# Patient Record
Sex: FEMALE | Race: WHITE | Employment: OTHER | URBAN - METROPOLITAN AREA
[De-identification: names, ages, dates, MRNs, and addresses within clinical notes are randomized per-mention and may not be internally consistent; named-entity substitution may affect disease eponyms.]

---

## 2017-01-14 DIAGNOSIS — M54.2 NECK PAIN: ICD-10-CM

## 2017-01-14 DIAGNOSIS — M17.0 PRIMARY OSTEOARTHRITIS OF BOTH KNEES: ICD-10-CM

## 2017-01-17 RX ORDER — IBUPROFEN 600 MG/1
TABLET ORAL
Qty: 60 TAB | Refills: 2 | Status: SHIPPED | OUTPATIENT
Start: 2017-01-17 | End: 2017-07-01 | Stop reason: SDUPTHER

## 2017-01-25 ENCOUNTER — OFFICE VISIT (OUTPATIENT)
Dept: FAMILY MEDICINE CLINIC | Age: 67
End: 2017-01-25

## 2017-01-25 ENCOUNTER — HOSPITAL ENCOUNTER (OUTPATIENT)
Dept: LAB | Age: 67
Discharge: HOME | End: 2017-01-25

## 2017-01-25 VITALS
SYSTOLIC BLOOD PRESSURE: 135 MMHG | HEART RATE: 76 BPM | BODY MASS INDEX: 25.41 KG/M2 | TEMPERATURE: 98.1 F | WEIGHT: 147 LBS | DIASTOLIC BLOOD PRESSURE: 86 MMHG

## 2017-01-25 DIAGNOSIS — B18.2 CHRONIC HEPATITIS C WITHOUT HEPATIC COMA (HCC): ICD-10-CM

## 2017-01-25 DIAGNOSIS — E78.00 PURE HYPERCHOLESTEROLEMIA: ICD-10-CM

## 2017-01-25 DIAGNOSIS — I10 ESSENTIAL HYPERTENSION WITH GOAL BLOOD PRESSURE LESS THAN 140/90: ICD-10-CM

## 2017-01-25 DIAGNOSIS — Z23 ENCOUNTER FOR IMMUNIZATION: ICD-10-CM

## 2017-01-25 DIAGNOSIS — E11.9 CONTROLLED TYPE 2 DIABETES MELLITUS WITHOUT COMPLICATION, WITHOUT LONG-TERM CURRENT USE OF INSULIN (HCC): Primary | ICD-10-CM

## 2017-01-25 DIAGNOSIS — M79.10 MUSCLE PAIN: ICD-10-CM

## 2017-01-25 DIAGNOSIS — E11.9 CONTROLLED TYPE 2 DIABETES MELLITUS WITHOUT COMPLICATION, WITHOUT LONG-TERM CURRENT USE OF INSULIN (HCC): ICD-10-CM

## 2017-01-25 LAB
ALBUMIN SERPL BCP-MCNC: 3.9 G/DL (ref 3.5–5)
ALBUMIN/GLOB SERPL: 1.2 {RATIO} (ref 1.1–2.2)
ALP SERPL-CCNC: 109 U/L (ref 45–117)
ALT SERPL-CCNC: 71 U/L (ref 12–78)
ANION GAP BLD CALC-SCNC: 7 MMOL/L (ref 5–15)
AST SERPL W P-5'-P-CCNC: 41 U/L (ref 15–37)
BILIRUB SERPL-MCNC: 0.7 MG/DL (ref 0.2–1)
BUN SERPL-MCNC: 14 MG/DL (ref 6–20)
BUN/CREAT SERPL: 23 (ref 12–20)
CALCIUM SERPL-MCNC: 9.2 MG/DL (ref 8.5–10.1)
CHLORIDE SERPL-SCNC: 100 MMOL/L (ref 97–108)
CO2 SERPL-SCNC: 31 MMOL/L (ref 21–32)
CREAT SERPL-MCNC: 0.62 MG/DL (ref 0.55–1.02)
ERYTHROCYTE [DISTWIDTH] IN BLOOD BY AUTOMATED COUNT: 13.1 % (ref 11.5–14.5)
EST. AVERAGE GLUCOSE BLD GHB EST-MCNC: 126 MG/DL
GLOBULIN SER CALC-MCNC: 3.3 G/DL (ref 2–4)
GLUCOSE POC: NORMAL MG/DL
GLUCOSE SERPL-MCNC: 109 MG/DL (ref 65–100)
HBA1C MFR BLD: 6 % (ref 4.2–6.3)
HCT VFR BLD AUTO: 45.4 % (ref 35–47)
HGB BLD-MCNC: 15.6 G/DL (ref 11.5–16)
MCH RBC QN AUTO: 30.8 PG (ref 26–34)
MCHC RBC AUTO-ENTMCNC: 34.4 G/DL (ref 30–36.5)
MCV RBC AUTO: 89.5 FL (ref 80–99)
PLATELET # BLD AUTO: 184 K/UL (ref 150–400)
POTASSIUM SERPL-SCNC: 3.9 MMOL/L (ref 3.5–5.1)
PROT SERPL-MCNC: 7.2 G/DL (ref 6.4–8.2)
RBC # BLD AUTO: 5.07 M/UL (ref 3.8–5.2)
SODIUM SERPL-SCNC: 138 MMOL/L (ref 136–145)
WBC # BLD AUTO: 5.5 K/UL (ref 3.6–11)

## 2017-01-25 PROCEDURE — 83036 HEMOGLOBIN GLYCOSYLATED A1C: CPT

## 2017-01-25 PROCEDURE — 85027 COMPLETE CBC AUTOMATED: CPT

## 2017-01-25 PROCEDURE — 80053 COMPREHEN METABOLIC PANEL: CPT

## 2017-01-25 RX ORDER — CYCLOBENZAPRINE HCL 10 MG
10 TABLET ORAL
Qty: 30 TAB | Refills: 2 | Status: SHIPPED | OUTPATIENT
Start: 2017-01-25 | End: 2017-08-05 | Stop reason: SDUPTHER

## 2017-02-20 ENCOUNTER — PATIENT OUTREACH (OUTPATIENT)
Dept: FAMILY MEDICINE CLINIC | Age: 67
End: 2017-02-20

## 2017-02-20 NOTE — PROGRESS NOTES
I called pt today for annual wellness visit and to prepare pt ahead of visit. She returned my call and we reviewed the medicare wellness preventative services schedule and updated her chart.

## 2017-02-22 ENCOUNTER — OFFICE VISIT (OUTPATIENT)
Dept: FAMILY MEDICINE CLINIC | Age: 67
End: 2017-02-22

## 2017-02-22 VITALS
DIASTOLIC BLOOD PRESSURE: 86 MMHG | HEART RATE: 77 BPM | SYSTOLIC BLOOD PRESSURE: 122 MMHG | BODY MASS INDEX: 26.79 KG/M2 | TEMPERATURE: 98.1 F | WEIGHT: 155 LBS

## 2017-02-22 DIAGNOSIS — Z12.11 SCREENING FOR COLON CANCER: ICD-10-CM

## 2017-02-22 DIAGNOSIS — Z13.39 SCREENING FOR ALCOHOLISM: ICD-10-CM

## 2017-02-22 DIAGNOSIS — F33.1 MODERATE EPISODE OF RECURRENT MAJOR DEPRESSIVE DISORDER (HCC): ICD-10-CM

## 2017-02-22 DIAGNOSIS — R11.0 NAUSEA: ICD-10-CM

## 2017-02-22 DIAGNOSIS — E78.00 PURE HYPERCHOLESTEROLEMIA: ICD-10-CM

## 2017-02-22 DIAGNOSIS — E11.9 CONTROLLED TYPE 2 DIABETES MELLITUS WITHOUT COMPLICATION, WITHOUT LONG-TERM CURRENT USE OF INSULIN (HCC): ICD-10-CM

## 2017-02-22 DIAGNOSIS — Z00.00 ROUTINE GENERAL MEDICAL EXAMINATION AT A HEALTH CARE FACILITY: ICD-10-CM

## 2017-02-22 LAB — GLUCOSE POC: NORMAL MG/DL

## 2017-02-22 RX ORDER — PRAVASTATIN SODIUM 40 MG/1
40 TABLET ORAL
COMMUNITY
End: 2018-07-23

## 2017-02-22 RX ORDER — PAROXETINE HYDROCHLORIDE 40 MG/1
TABLET, FILM COATED ORAL
Qty: 90 TAB | Refills: 0 | Status: SHIPPED | OUTPATIENT
Start: 2017-02-22 | End: 2017-06-29 | Stop reason: SDUPTHER

## 2017-02-22 RX ORDER — PROMETHAZINE HYDROCHLORIDE 12.5 MG/1
12.5 TABLET ORAL
Qty: 30 TAB | Refills: 0 | Status: SHIPPED | OUTPATIENT
Start: 2017-02-22 | End: 2017-05-09 | Stop reason: SDUPTHER

## 2017-02-22 NOTE — PROGRESS NOTES
Coordination of Care  1. Have you been to the ER, urgent care clinic since your last visit? Hospitalized since your last visit? No    2. Have you seen or consulted any other health care providers outside of the [facility] since your last visit? Include any pap smears or colon screening.  No    Medications  Medication Reconciliation Performed: no  Patient does need refills     Learning Assessment Complete? yes  Results for orders placed or performed in visit on 02/22/17   AMB POC GLUCOSE BLOOD, BY GLUCOSE MONITORING DEVICE   Result Value Ref Range    Glucose POC 99 Fasting mg/dL

## 2017-02-22 NOTE — PROGRESS NOTES
This is an Initial Medicare Annual Wellness Exam (AWV) (Performed 12 months after IPPE or effective date of Medicare Part B enrollment, Once in a lifetime)    I have reviewed the patient's medical history in detail and updated the computerized patient record. History     Past Medical History:   Diagnosis Date    Diabetes ([address]     Gastrointestinal disorder     History of mammography, screening 2013    Hypertension     Pap smear for cervical cancer screening 2013      Past Surgical History:   Procedure Laterality Date    HX ORTHOPAEDIC Right 2012    right knee medial meniscus surgery     Current Outpatient Prescriptions   Medication Sig Dispense Refill    cyclobenzaprine (FLEXERIL) 10 mg tablet Take 1 Tab by mouth three (3) times daily as needed for Muscle Spasm(s). 30 Tab 2    ibuprofen (MOTRIN) 600 mg tablet TAKE 1 TAB BY MOUTH EVERY EIGHT (8) HOURS AS NEEDED FOR PAIN. 60 Tab 2    lisinopril-hydroCHLOROthiazide (PRINZIDE, ZESTORETIC) 10-12.5 mg per tablet TAKE 1 TABLET BY MOUTH EVERY DAY 90 Tab 1    metFORMIN ER (GLUCOPHAGE XR) 500 mg tablet TAKE 1 TABLET BY MOUTH EVERY DAY WITH DINNER 30 Tab 5    triamcinolone acetonide (KENALOG) 0.025 % ointment Apply a thin layer to rash on face twice a day. 15 g 0    pravastatin (PRAVACHOL) 20 mg tablet Take 1 Tab by mouth nightly. 30 Tab 5    promethazine (PHENERGAN) 12.5 mg tablet Take 1 Tab by mouth every six (6) hours as needed for Nausea. 30 Tab 0    PARoxetine (PAXIL) 40 mg tablet TAKE 1 TABLET BY MOUTH DAILY. 90 Tab 0    traMADol (ULTRAM) 50 mg tablet Take 1 Tab by mouth every eight (8) hours as needed for Pain. Max Daily Amount: 150 mg. 30 Tab 1    Blood-Glucose Meter (TRUE METRIX AIR GLUCOSE METER) [facility] [place] as directed. 1 Each 0    glucose blood VI test strips (TRUE METRIX GLUCOSE TEST STRIP) strip Use to check glucose twice a day. 100 Strip 5    lancets (TRUEPLUS LANCETS) 28 gauge misc Use to check glucose twice a day.  100 Units 5    Blood Glucose Control, Low (TRUE METRIX LEVEL 1) soln Use as directed. 1 Each 2    alcohol swabs (BD SINGLE USE SWABS REGULAR) padm Use as directed. 100 Pad 5    Arm Brace (NEOPRENE WRIST SPLINT SUPPORT) misc Apply to right wrist. 1 Each 0     Allergies   Allergen Reactions    Amoxicillin Nausea and Vomiting    Penicillin G Nausea and Vomiting     Pt can't remember that reaction it caused.      Family History   Problem Relation Age of Onset   Kalyn Tanner Cancer Mother       at 59 lung cancer    Cancer Brother      half brother    Other Maternal Grandmother      GI hemorrhage    Other Father      Pt does not know her father    Cancer Brother      half brother     Social History   Substance Use Topics    Smoking status: Current Every Day Smoker     Packs/day: 0.50     Types: Cigarettes    Smokeless tobacco: Not on file    Alcohol use No     Patient Active Problem List   Diagnosis Code    Depression F32.9    Diabetes mellitus type 2, controlled ([facility] [address] E11.9    Plantar wart of right foot B07.0    Hepatitis C, chronic (HCC) B18.2    Onychomycosis B35.1    Primary osteoarthritis of both knees M17.0    Pure hypercholesterolemia E78.00    Osteoporosis M81.0    Essential hypertension with goal blood pressure less than 140/90 I10         Depression Risk Factor Screening:     PHQ 2 / 9, over the last two weeks 2017   Little interest or pleasure in doing things Several days   Feeling down, depressed or hopeless Nearly every day   Total Score PHQ 2 4   Trouble falling or staying asleep, or sleeping too much Several days   Feeling tired or having little energy Several days   Poor appetite or overeating Several days   Feeling bad about yourself - or that you are a failure or have let yourself or your family down More than half the days   Trouble concentrating on things such as school, work, reading or watching TV Not at all   Moving or speaking so slowly that other people could have noticed; or the opposite being so fidgety that others notice Not at all   Thoughts of being better off dead, or hurting yourself in some way Not at all   PHQ 9 Score 9   How difficult have these problems made it for you to do your work, take care of your home and get along with others Somewhat difficult     Alcohol Risk Factor Screening:   {screenin}    Functional Ability and Level of Safety:     Hearing Loss   {Desc; hearing loss:82028}    Activities of Daily Living   {ADL:03561::\"Self-care\"}. Requires assistance with: {ADLs:98207::\"no ADLs\"}    Fall Risk     Fall Risk Assessment, last 12 mths 2017   Able to walk? Yes   Fall in past 12 months? No     Abuse Screen   {Abuse Screen:::\"Patient is not abused\"}    Review of Systems   {ros - complete:59701}    Physical Examination     No exam data present    Evaluation of Cognitive Function:  Mood/affect:  {mood:52172}  Appearance: {APPEARANCE:42294::\"age appropriate\"}  Family member/caregiver input: ***    {Exam, Complete:11180}    Patient Care Team:  Chente Beltrán MD as PCP - General (Family Practice)    Advice/Referrals/Counseling   Education and counseling provided:  {Education List, choose as appropriate:}    Assessment/Plan   {Assessment and Plan:74907}.

## 2017-02-22 NOTE — PROGRESS NOTES
This is an Initial Medicare Annual Wellness Exam (AWV) (Performed 12 months after IPPE or effective date of Medicare Part B enrollment, Once in a lifetime)    I have reviewed the patient's medical history in detail and updated the computerized patient record. History     Past Medical History:   Diagnosis Date    Diabetes ([address]     Gastrointestinal disorder     History of mammography, screening 2013    Hypertension     Pap smear for cervical cancer screening 2013      Past Surgical History:   Procedure Laterality Date    HX ORTHOPAEDIC Right 2012    right knee medial meniscus surgery     Current Outpatient Prescriptions   Medication Sig Dispense Refill    cyclobenzaprine (FLEXERIL) 10 mg tablet Take 1 Tab by mouth three (3) times daily as needed for Muscle Spasm(s). 30 Tab 2    ibuprofen (MOTRIN) 600 mg tablet TAKE 1 TAB BY MOUTH EVERY EIGHT (8) HOURS AS NEEDED FOR PAIN. 60 Tab 2    lisinopril-hydroCHLOROthiazide (PRINZIDE, ZESTORETIC) 10-12.5 mg per tablet TAKE 1 TABLET BY MOUTH EVERY DAY 90 Tab 1    metFORMIN ER (GLUCOPHAGE XR) 500 mg tablet TAKE 1 TABLET BY MOUTH EVERY DAY WITH DINNER 30 Tab 5    triamcinolone acetonide (KENALOG) 0.025 % ointment Apply a thin layer to rash on face twice a day. 15 g 0    pravastatin (PRAVACHOL) 20 mg tablet Take 1 Tab by mouth nightly. 30 Tab 5    promethazine (PHENERGAN) 12.5 mg tablet Take 1 Tab by mouth every six (6) hours as needed for Nausea. 30 Tab 0    PARoxetine (PAXIL) 40 mg tablet TAKE 1 TABLET BY MOUTH DAILY. 90 Tab 0    traMADol (ULTRAM) 50 mg tablet Take 1 Tab by mouth every eight (8) hours as needed for Pain. Max Daily Amount: 150 mg. 30 Tab 1    Blood-Glucose Meter (TRUE METRIX AIR GLUCOSE METER) [facility] [place] Use as directed. 1 Each 0    glucose blood VI test strips (TRUE METRIX GLUCOSE TEST STRIP) strip Use to check glucose twice a day. 100 Strip 5    lancets (TRUEPLUS LANCETS) 28 gauge misc Use to check glucose twice a day.  100 Units 5    Blood Glucose Control, Low (TRUE METRIX LEVEL 1) soln Use as directed. 1 Each 2    alcohol swabs (BD SINGLE USE SWABS REGULAR) padm Use as directed. 100 Pad 5    Arm Brace (NEOPRENE WRIST SPLINT SUPPORT) misc Apply to right wrist. 1 Each 0     Allergies   Allergen Reactions    Amoxicillin Nausea and Vomiting    Penicillin G Nausea and Vomiting     Pt can't remember that reaction it caused.      Family History   Problem Relation Age of Onset   Mac Byers Cancer Mother       at 59 lung cancer    Cancer Brother      half brother    Other Maternal Grandmother      GI hemorrhage    Other Father      Pt does not know her father    Cancer Brother      half brother     Social History   Substance Use Topics    Smoking status: Current Every Day Smoker     Packs/day: 0.50     Types: Cigarettes    Smokeless tobacco: Not on file    Alcohol use No     Patient Active Problem List   Diagnosis Code    Depression F32.9    Diabetes mellitus type 2, controlled ([facility] [address] E11.9    Plantar wart of right foot B07.0    Hepatitis C, chronic (HCC) B18.2    Onychomycosis B35.1    Primary osteoarthritis of both knees M17.0    Pure hypercholesterolemia E78.00    Osteoporosis M81.0    Essential hypertension with goal blood pressure less than 140/90 I10         Depression Risk Factor Screening:     PHQ 2 / 9, over the last two weeks 2017   Little interest or pleasure in doing things Several days   Feeling down, depressed or hopeless Nearly every day   Total Score PHQ 2 4   Trouble falling or staying asleep, or sleeping too much Several days   Feeling tired or having little energy Several days   Poor appetite or overeating Several days   Feeling bad about yourself - or that you are a failure or have let yourself or your family down More than half the days   Trouble concentrating on things such as school, work, reading or watching TV Not at all   Moving or speaking so slowly that other people could have noticed; or the opposite being so fidgety that others notice Not at all   Thoughts of being better off dead, or hurting yourself in some way Not at all   PHQ 9 Score 9   How difficult have these problems made it for you to do your work, take care of your home and get along with others Somewhat difficult     Alcohol Risk Factor Screening:   N/A    Functional Ability and Level of Safety:     Hearing Loss   none    Activities of Daily Living   Self-care. Requires assistance with: No flowsheet data found. ADL Assessment 2/22/2017   Feeding yourself No Help Needed   Getting from bed to chair No Help Needed   Getting dressed No Help Needed   Bathing or showering No Help Needed   Walk across the room (includes cane/walker) No Help Needed   Using the telphone No Help Needed   Taking your medications No Help Needed   Preparing meals No Help Needed   Managing money (expenses/bills) No Help Needed   Moderately strenuous housework (laundry) No Help Needed   Shopping for personal items (toiletries/medicines) No Help Needed   Shopping for groceries No Help Needed   Driving No Help Needed   Climbing a flight of stairs No Help Needed   Getting to places beyond walking distances No Help Needed         Fall Risk     Fall Risk Assessment, last 12 mths 2/20/2017   Able to walk? Yes   Fall in past 12 months? No     Abuse Screen   Patient is not abused    Review of Systems   Not required    Physical Examination     No exam data present    Evaluation of Cognitive Function:  Mood/affect:  neutral  Appearance: age appropriate  Family member/caregiver input: none present    No exam performed today, annual wellness exam.    Patient Care Team:  Dion Pack MD as PCP - General (Family Practice)    Advice/Referrals/Counseling   Education and counseling provided:  Screening Mammography  Colorectal cancer screening tests  Screening for glaucoma    Assessment/Plan   very strongly urged to quit smoking to reduce cardiovascular risk.  Pt reports that she does not currently exercise. Invited pt for ACP/HC and gave folder. Pt is still smoking 1/2 PPD. She does not currently exercise, she denies incontinence, her support system is her girlfriend.

## 2017-02-22 NOTE — PATIENT INSTRUCTIONS
Today's Date -  2/22/17  Medicare Part B Preventive Services Limitations Recommendation/Date completed if known Scheduled/ Next Due   Bone Mass Measurement  (age 72 & older, biennial) Requires diagnosis related to osteoporosis or estrogen deficiency. Biennial benefit unless patient has history of long-term glucocorticoid tx or baseline is needed because initial test was by other method Completed:6/6/16      Recommended every 2 years DUE:6/6/18   Cardiovascular Screening Blood Tests (every 5 years)  Total cholesterol, HDL, Triglycerides Order as a panel if possible Completed:5/16/16      Recommended annually DUE: 5/16/17   Colorectal Cancer Screening  -Fecal occult blood test (annual)  -Flexible sigmoidoscopy (5y)  -Screening colonoscopy (10y)  -Barium Enema  Completed:you are unsure of date, you have been told by GI that you are due for repeat colonoscopy. Recommended every 10 years DUE: NOW   Counseling to Prevent Tobacco Use (up to 8 sessions per year)  - Counseling greater than 3 and up to 10 minutes  - Counseling greater than 10 minutes Patients must be asymptomatic of tobacco-related conditions to receive as preventive service     Diabetes Screening Tests (at least every 3 years, Medicare covers annually or at 6-month intervals for prediabetic patients)    Fasting blood sugar (FBS) or glucose tolerance test (GTT) Patient must be diagnosed with one of the following:  -Hypertension, Dyslipidemia, obesity, previous impaired FBS or GTT  Or any two of the following: overweight, FH of diabetes, age ? 72, history of gestational diabetes, birth of baby weighing more than 9 pounds Completed:A1C done 1/25/17 and was 6.0        Recommended annually DUE:7/25/17   Diabetes Self-Management Training (DSMT) (no USPSTF recommendation) Requires referral by treating physician for patient with diabetes or renal disease. 10 hours of initial DSMT session of no less than 30 minutes each in a continuous 12-month period.   2 hours of follow-up DSMT in subsequent years. Glaucoma Screening (no USPSTF recommendation) Diabetes mellitus, family history, , age 48 or over,  American, age 72 or over Completed: You had eye exam more than 2 years ago      Recommended annually DUE: NOW   Human Immunodeficiency Virus (HIV) Screening (annually for increased risk patients)  HIV-1 and HIV-2 by EIA, PATRICE, rapid antibody test, or oral mucosa transudate Patient must be at increased risk for HIV infection per USPSTF guidelines or pregnant. Tests covered annually for patients at increased risk. Pregnant patients may receive up to 3 test during pregnancy. Medical Nutrition Therapy (MNT) (for diabetes or renal disease not recommended schedule) Requires referral by treating physician for patient with diabetes or renal disease. Can be provided in same year as diabetes self-management training (DSMT), and CMS recommends medical nutrition therapy take place after DSMT. Up to 3 hours for initial year and 2 hours in subsequent years. Prostate Cancer Screening (annually up to age 76)  - Digital rectal exam (ANGELIKA)  - Prostate specific antigen (PSA) Annually (age 48 or over), ANGELIKA not paid separately when covered E/M service is provided on same date Completed:        Recommended annually DUE:   Seasonal Influenza Vaccination (annually)  Completed:1/25/17       Recommended annually    DUE every Fall    Pneumococcal Vaccination (once after 72)  Completed:  1/25/17    Hepatitis B Vaccinations (if medium/high risk) Medium/high risk factors:  End-stage renal disease,  Hemophiliacs who received Factor VIII or IX concentrates, Clients of institutions for the mentally retarded, Persons who live in the same house as a HepB virus carrier, Homosexual men, Illicit injectable drug abusers. Screening Mammography (biennial age 54-69)?  Annually (age 36 or over) Completed: 6/22/16      Recommended annually DUE:6/22/17   Screening Pap Tests and Pelvic Examination (up to age 79 and after 79 if unknown history or abnormal study last 10 years) Every 24 months except high risk Completed:8/23/12        Recommended every 2 years DUE:NOW   Ultrasound Screening for Abdominal Aortic Aneurysm (AAA) (once) Patient must be referred through IPPE and not have had a screening for abdominal aortic aneurysm before under Medicare. Limited to patients who meet one of the following criteria:  - Men who are 73-68 years old and have smoked more than 100 cigarettes in their lifetime.  -Anyone with a FH of AAA  -Anyone recommended for screening by USPSTF Not covered by Medicare as preventive. Thanks for coming in today. It was nice to spend some time with you. If you have any questions about your visit today, please call [phone] and ask for Fannie Reyes. Patient verbalized understanding of information presented. AVS and Medicare Part B Preventive Services Table printed and given to pt and reviewed. See table for findings under Recommendation and Scheduled. All of the patient's questions were answered. Advance Directives: Care Instructions  Your Care Instructions  An advance directive is a legal way to state your wishes at the end of your life. It tells your family and your doctor what to do if you can no longer say what you want. There are two main types of advance directives. You can change them any time that your wishes change. · A living will tells your family and your doctor your wishes about life support and other treatment. · A medical power of  lets you name a person to make treatment decisions for you when you can't speak for yourself. This person is called a health care agent. If you do not have an advance directive, decisions about your medical care may be made by a doctor or a  who doesn't know you. It may help to think of an advance directive as a gift to the people who care for you.  If you have one, they won't have to make tough decisions by themselves. Follow-up care is a key part of your treatment and safety. Be sure to make and go to all appointments, and call your doctor if you are having problems. It's also a good idea to know your test results and keep a list of the medicines you take. How can you care for yourself at home? · Discuss your wishes with your loved ones and your doctor. This way, there are no surprises. · Many states have a unique form. Or you might use a universal form that has been approved by many states. This kind of form can sometimes be completed and stored online. Your electronic copy will then be available wherever you have a connection to the Internet. In most cases, doctors will respect your wishes even if you have a form from a different state. · You don't need a  to do an advance directive. But you may want to get legal advice. · Think about these questions when you prepare an advance directive:  ¨ Who do you want to make decisions about your medical care if you are not able to? Many people choose a family member, close friend, or doctor. ¨ Do you know enough about life support methods that might be used? If not, talk to your doctor so you understand. ¨ What are you most afraid of that might happen? You might be afraid of having pain, losing your independence, or being kept alive by machines. ¨ Where would you prefer to die? Choices include your home, a hospital, or a nursing home. ¨ Would you like to have information about hospice care to support you and your family? ¨ Do you want to donate organs when you die? ¨ Do you want certain Nondenominational practices performed before you die? If so, put your wishes in the advance directive. · Read your advance directive every year, and make changes as needed. When should you call for help? Be sure to contact your doctor if you have any questions. Where can you learn more? Go to http://darrius-kathryn.info/.   Enter R264 in the search box to learn more about \"Advance Directives: Care Instructions. \"  Current as of: February 24, 2016  Content Version: 11.1  © 2006-2016 [employer]. Care instructions adapted under license by [employer] (which disclaims liability or warranty for this information). If you have questions about a medical condition or this instruction, always ask your healthcare professional. [address] any warranty or liability for your use of this information. Learning About Colonoscopy  What is a colonoscopy? A colonoscopy is a test (also called a procedure) that lets a doctor look inside your large intestine. The doctor uses a thin, lighted tube called a colonoscope. The doctor uses it to look for small growths called polyps, colon or rectal cancer (colorectal cancer), or other problems like bleeding. During the procedure, the doctor can take samples of tissue. The samples can then be checked for cancer or other conditions. The doctor can also take out polyps. How is colonoscopy done? This procedure is done in a doctor's office or a clinic or hospital. You will get medicine to help you relax and not feel pain. Some people find that they do not remember having the test because of the medicine. The doctor gently moves the colonoscope, or scope, through the colon. The scope is also a small video camera. It lets the doctor see the colon and take pictures. A colonoscopy usually takes 30 to 45 minutes. It may take longer if the doctor has to remove polyps. How do you prepare for the procedure? You need to clean out your colon before the procedure so the doctor can see all of your colon. You may start the cleaning process a day or two before the test. This depends on which \"colon prep\" your doctor recommends. To clean your colon, you stop eating solid foods and drink only clear liquids.  You can have water, tea, coffee, clear juices, clear broths, flavored ice pops, and gelatin (such as Jell-O). Do not drink anything red or purple, such as grape juice or fruit punch. And do not eat red or purple foods, such as grape ice pops or cherry gelatin. The day or night before the procedure, you drink a large amount of a special liquid. This causes loose, frequent stools. You will go to the bathroom a lot. It is very important to drink all of the colon prep liquid. If you have problems drinking the liquid, call your doctor. For many people, the prep is worse than the test. It may be uncomfortable, and you may feel hungry on the clear liquid diet. Some people do not go to work or do their usual activities on the day of the prep. Arrange to have someone take you home after the test.  What can you expect after a colonoscopy? The nurses will watch you for 1 to 2 hours until the medicines wear off. Then you can go home. You will need a ride. Your doctor will tell you when you can eat and do your usual activities. Your doctor will talk to you about when you will need your next colonoscopy. The results of your test and your risk for colorectal cancer will help your doctor decide how often you need to be checked. Follow-up care is a key part of your treatment and safety. Be sure to make and go to all appointments, and call your doctor if you are having problems. It's also a good idea to know your test results and keep a list of the medicines you take. Where can you learn more? Go to http://darrius-kathryn.info/. Enter G000 in the search box to learn more about \"Learning About Colonoscopy. \"  Current as of: July 26, 2016  Content Version: 11.1  © 3843-5995 [employer]. Care instructions adapted under license by [employer] (which disclaims liability or warranty for this information).  If you have questions about a medical condition or this instruction, always ask your healthcare professional. Bhargav Solorzano disclaims any warranty or liability for your use of this information. Advance Directives: Care Instructions  Your Care Instructions  An advance directive is a legal way to state your wishes at the end of your life. It tells your family and your doctor what to do if you can no longer say what you want. There are two main types of advance directives. You can change them any time that your wishes change. · A living will tells your family and your doctor your wishes about life support and other treatment. · A medical power of  lets you name a person to make treatment decisions for you when you can't speak for yourself. This person is called a health care agent. If you do not have an advance directive, decisions about your medical care may be made by a doctor or a  who doesn't know you. It may help to think of an advance directive as a gift to the people who care for you. If you have one, they won't have to make tough decisions by themselves. Follow-up care is a key part of your treatment and safety. Be sure to make and go to all appointments, and call your doctor if you are having problems. It's also a good idea to know your test results and keep a list of the medicines you take. How can you care for yourself at home? · Discuss your wishes with your loved ones and your doctor. This way, there are no surprises. · Many states have a unique form. Or you might use a universal form that has been approved by many states. This kind of form can sometimes be completed and stored online. Your electronic copy will then be available wherever you have a connection to the Internet. In most cases, doctors will respect your wishes even if you have a form from a different state. · You don't need a  to do an advance directive. But you may want to get legal advice. · Think about these questions when you prepare an advance directive:  ¨ Who do you want to make decisions about your medical care if you are not able to?  Many people choose a family member, close friend, or doctor. ¨ Do you know enough about life support methods that might be used? If not, talk to your doctor so you understand. ¨ What are you most afraid of that might happen? You might be afraid of having pain, losing your independence, or being kept alive by machines. ¨ Where would you prefer to die? Choices include your home, a hospital, or a nursing home. ¨ Would you like to have information about hospice care to support you and your family? ¨ Do you want to donate organs when you die? ¨ Do you want certain Religion practices performed before you die? If so, put your wishes in the advance directive. · Read your advance directive every year, and make changes as needed. When should you call for help? Be sure to contact your doctor if you have any questions. Where can you learn more? Go to http://darrius-kathryn.info/. Enter R264 in the search box to learn more about \"Advance Directives: Care Instructions. \"  Current as of: February 24, 2016  Content Version: 11.1  © 6508-3384 [employer]. Care instructions adapted under license by [employer] (which disclaims liability or warranty for this information). If you have questions about a medical condition or this instruction, always ask your healthcare professional. [address] any warranty or liability for your use of this information. Call your insurance company about a participating gastroenterologist and schedule your colonoscopy. Make an appointment through [facility]  [address] [place] [address] for a diabetic eye exam.

## 2017-02-22 NOTE — MR AVS SNAPSHOT
Visit Information Date & Time Provider Department Dept. Phone Encounter #  
 2/22/2017 10:30 AM Lum Phalen, [address] [phone] [id number] Follow-up Instructions Return in about 3 months (around 5/22/2017). Your Appointments 2/27/2017  1:45 PM  
FINANCIAL SCREENING with [address] 1503 Nico Reyna (Nuno Lerma) Appt Note: ss letter 651 [facility] [address]  
  
   
 1516 [facility] Upcoming Health Maintenance Date Due DTaP/Tdap/Td series (1 - Tdap) 12/25/1970 FOBT Q 1 YEAR AGE 50-75 12/25/1999 ZOSTER VACCINE AGE 60> 12/25/2009 EYE EXAM RETINAL OR DILATED Q1 1/1/2013 GLAUCOMA SCREENING Q2Y 12/25/2014 MEDICARE YEARLY EXAM 12/25/2014 FOOT EXAM Q1 2/9/2016 MICROALBUMIN Q1 5/16/2017 LIPID PANEL Q1 5/16/2017 HEMOGLOBIN A1C Q6M 7/25/2017 Pneumococcal 65+ Low/Medium Risk (2 of 2 - PCV13) 1/25/2018 BREAST CANCER SCRN MAMMOGRAM 6/22/2018 Allergies as of 2/22/2017  Review Complete On: 2/22/2017 By: [place] Severity Noted Reaction Type Reactions Amoxicillin  04/17/2013    Nausea and Vomiting Penicillin G  04/17/2013    Nausea and Vomiting Pt can't remember that reaction it caused. Current Immunizations  Reviewed on 1/25/2017 Name Date Influenza Vaccine (Quad) PF 1/25/2017 Pneumococcal Polysaccharide (PPSV-23) 1/25/2017, 2/9/2015 Not reviewed this visit You Were Diagnosed With   
  
 Codes Comments Routine general medical examination at a health care facility    -  Primary ICD-10-CM: Z00.00 ICD-9-CM: V70.0 Screening for alcoholism     ICD-10-CM: Z13.89 ICD-9-CM: V79.1 Screening for colon cancer     ICD-10-CM: Z12.11 ICD-9-CM: V76.51 Controlled type 2 diabetes mellitus without complication, without long-term current use of insulin ([facility] 75.)     ICD-10-CM: E11.9 ICD-9-CM: 250.00 Moderate episode of recurrent major depressive disorder (HCC)     ICD-10-CM: F33.1 ICD-9-CM: 296.32 Nausea     ICD-10-CM: R11.0 ICD-9-CM: 787.02   
 Pure hypercholesterolemia     ICD-10-CM: E78.00 ICD-9-CM: 272.0 Vitals BP  
  
  
  
  
  
 122/86 (BP 1 Location: Left arm, BP Patient Position: Sitting) Vitals History BMI and BSA Data Body Mass Index Body Surface Area  
 26.79 kg/m 2 1.78 m 2 Preferred Pharmacy Pharm[facility] Name Phone [id number] [address] ([place]) [phone] Your Updated Medication List  
  
   
This list is accurate as of: 2/22/17 11:34 AM.  Always use your most recent med list.  
  
  
  
  
 alcohol swabs Padm Commonly known as:  BD Single Use Swabs Regular Use as directed. Arm Brace Misc Commonly known as:  NEOPRENE WRIST SPLINT SUPPORT Apply to right wrist.  
  
 Blood Glucose Control, Low Soln Commonly known as:  TRUE METRIX LEVEL 1 Use as directed. Blood-Glucose Meter Misc Commonly known as:  TRUE METRIX AIR GLUCOSE METER Use as directed. cyclobenzaprine 10 mg tablet Commonly known as:  FLEXERIL Take 1 Tab by mouth three (3) times daily as needed for Muscle Spasm(s). glucose blood VI test strips strip Commonly known as:  TRUE METRIX GLUCOSE TEST STRIP Use to check glucose twice a day. ibuprofen 600 mg tablet Commonly known as:  MOTRIN  
TAKE 1 TAB BY MOUTH EVERY EIGHT (8) HOURS AS NEEDED FOR PAIN. lancets 28 gauge Misc Commonly known as:  Gianni Monticello Use to check glucose twice a day. lisinopril-hydroCHLOROthiazide 10-12.5 mg per tablet Commonly known as:  PRINZIDE, ZESTORETIC  
TAKE 1 TABLET BY MOUTH EVERY DAY  
  
 metFORMIN  mg tablet Commonly known as:  GLUCOPHAGE XR  
TAKE 1 TABLET BY MOUTH EVERY DAY WITH DINNER  
  
 PARoxetine 40 mg tablet Commonly known as:  PAXIL TAKE 1 TABLET BY MOUTH DAILY. pravastatin 40 mg tablet Commonly known as:  PRAVACHOL Take 40 mg by mouth nightly. promethazine 12.5 mg tablet Commonly known as:  PHENERGAN Take 1 Tab by mouth every six (6) hours as needed for Nausea. traMADol 50 mg tablet Commonly known as:  ULTRAM  
Take 1 Tab by mouth every eight (8) hours as needed for Pain. Max Daily Amount: 150 mg.  
  
 triamcinolone acetonide 0.025 % ointment Commonly known as:  KENALOG Apply a thin layer to rash on face twice a day. Prescriptions Sent to Pharmacy Refills PARoxetine (PAXIL) 40 mg tablet 0 Sig: TAKE 1 TABLET BY MOUTH DAILY. Class: Normal  
 [place] [facility] [place] [phone]  
 promethazine (PHENERGAN) 12.5 mg tablet 0 Sig: Take 1 Tab by mouth every six (6) hours as needed for Nausea. Class: Normal  
 Pharmacy: [id number]- [address], [address] ([place] [address]) Ph #: [phone] Route: Oral  
  
We Performed the Following AMB POC GLUCOSE BLOOD, BY GLUCOSE MONITORING DEVICE [29949 CPT(R)] REFERRAL TO GASTROENTEROLOGY [NYO42 Custom] Comments:  
 Please evaluate patient for colonoscopy. REFERRAL TO OPHTHALMOLOGY [REF57 Custom] Follow-up Instructions Return in about 3 months (around 5/22/2017). Referral Information Referral ID Referred By Referred To  
  
 [id number] Bambi Stevens Not Available Visits Status Start Date End Date 1 New Request 2/22/17 2/22/18 If your referral has a status of pending review or denied, additional information will be sent to support the outcome of this decision. Referral ID Referred By Referred To  
 [id number] Bambi Stevens Not Available Visits Status Start Date End Date 1 New Request 2/22/17 2/22/18 If your referral has a status of pending review or denied, additional information will be sent to support the outcome of this decision. Patient Instructions Today's Date -  2/22/17 Medicare Part B Preventive Services Limitations Recommendation/Date completed if known Scheduled/ Next Due Bone Mass Measurement 
(age 72 & older, biennial) Requires diagnosis related to osteoporosis or estrogen deficiency. Biennial benefit unless patient has history of long-term glucocorticoid tx or baseline is needed because initial test was by other method Completed:6/6/16 Recommended every 2 years DUE:6/6/18 Cardiovascular Screening Blood Tests (every 5 years) Total cholesterol, HDL, Triglycerides Order as a panel if possible Completed:5/16/16 Recommended annually DUE: 5/16/17 Colorectal Cancer Screening 
-Fecal occult blood test (annual) -Flexible sigmoidoscopy (5y) 
-Screening colonoscopy (10y) -Barium Enema  Completed:you are unsure of date, you have been told by GI that you are due for repeat colonoscopy. Recommended every 10 years DUE: NOW Counseling to Prevent Tobacco Use (up to 8 sessions per year) - Counseling greater than 3 and up to 10 minutes - Counseling greater than 10 minutes Patients must be asymptomatic of tobacco-related conditions to receive as preventive service Diabetes Screening Tests (at least every 3 years, Medicare covers annually or at 6-month intervals for prediabetic patients) Fasting blood sugar (FBS) or glucose tolerance test (GTT) Patient must be diagnosed with one of the following: 
-Hypertension, Dyslipidemia, obesity, previous impaired FBS or GTT 
Or any two of the following: overweight, FH of diabetes, age ? 72, history of gestational diabetes, birth of baby weighing more than 9 pounds Completed:A1C done 1/25/17 and was 6.0 Recommended annually DUE:7/25/17 Diabetes Self-Management Training (DSMT) (no USPSTF recommendation) Requires referral by treating physician for patient with diabetes or renal disease. 10 hours of initial DSMT session of no less than 30 minutes each in a continuous 12-month period. 2 hours of follow-up DSMT in subsequent years. Glaucoma Screening (no USPSTF recommendation) Diabetes mellitus, family history, , age 48 or over,  American, age 72 or over Completed: You had eye exam more than 2 years ago Recommended annually DUE: NOW Human Immunodeficiency Virus (HIV) Screening (annually for increased risk patients) HIV-1 and HIV-2 by EIA, PATRICE, rapid antibody test, or oral mucosa transudate Patient must be at increased risk for HIV infection per USPSTF guidelines or pregnant. Tests covered annually for patients at increased risk. Pregnant patients may receive up to 3 test during pregnancy. Medical Nutrition Therapy (MNT) (for diabetes or renal disease not recommended schedule) Requires referral by treating physician for patient with diabetes or renal disease. Can be provided in same year as diabetes self-management training (DSMT), and CMS recommends medical nutrition therapy take place after DSMT. Up to 3 hours for initial year and 2 hours in subsequent years. Prostate Cancer Screening (annually up to age 76) - Digital rectal exam (ANGELIKA) - Prostate specific antigen (PSA) Annually (age 48 or over), ANGELIKA not paid separately when covered E/M service is provided on same date Completed: 
 
 
 
Recommended annually DUE:  
Seasonal Influenza Vaccination (annually)  Completed:1/25/17 Recommended annually DUE every Fall Pneumococcal Vaccination (once after 65)  Completed: 
1/25/17 Hepatitis B Vaccinations (if medium/high risk) Medium/high risk factors:  End-stage renal disease, Hemophiliacs who received Factor VIII or IX concentrates, Clients of institutions for the mentally retarded, Persons who live in the same house as a HepB virus carrier, Homosexual men, Illicit injectable drug abusers. Screening Mammography (biennial age 54-69)? Annually (age 36 or over) Completed: 6/22/16 Recommended annually DUE:6/22/17 Screening Pap Tests and Pelvic Examination (up to age 79 and after 79 if unknown history or abnormal study last 10 years) Every 24 months except high risk Completed:8/23/12 Recommended every 2 years UZN:DOM Ultrasound Screening for Abdominal Aortic Aneurysm (AAA) (once) Patient must be referred through IPPE and not have had a screening for abdominal aortic aneurysm before under Medicare. Limited to patients who meet one of the following criteria: 
- Men who are 73-68 years old and have smoked more than 100 cigarettes in their lifetime. 
-Anyone with a FH of AAA 
-Anyone recommended for screening by USPSTF Not covered by Medicare as preventive. Thanks for coming in today. It was nice to spend some time with you. If you have any questions about your visit today, please call [phone] and ask for [facility]. Patient verbalized understanding of information presented. AVS and Medicare Part B Preventive Services Table printed and given to pt and reviewed. See table for findings under Recommendation and Scheduled. All of the patient's questions were answered. Advance Directives: Care Instructions Your Care Instructions An advance directive is a legal way to state your wishes at the end of your life. It tells your family and your doctor what to do if you can no longer say what you want. There are two main types of advance directives. You can change them any time that your wishes change. · A living will tells your family and your doctor your wishes about life support and other treatment. · A medical power of  lets you name a person to make treatment decisions for you when you can't speak for yourself. This person is called a health care agent. If you do not have an advance directive, decisions about your medical care may be made by a doctor or a  who doesn't know you. It may help to think of an advance directive as a gift to the people who care for you. If you have one, they won't have to make tough decisions by themselves. Follow-up care is a key part of your treatment and safety. Be sure to make and go to all appointments, and call your doctor if you are having problems. It's also a good idea to know your test results and keep a list of the medicines you take. How can you care for yourself at home? · Discuss your wishes with your loved ones and your doctor. This way, there are no surprises. · Many states have a unique form. Or you might use a universal form that has been approved by many states. This kind of form can sometimes be completed and stored online. Your electronic copy will then be available wherever you have a connection to the Internet. In most cases, doctors will respect your wishes even if you have a form from a different state. · You don't need a  to do an advance directive. But you may want to get legal advice. · Think about these questions when you prepare an advance directive: ¨ Who do you want to make decisions about your medical care if you are not able to? Many people choose a family member, close friend, or doctor. ¨ Do you know enough about life support methods that might be used? If not, talk to your doctor so you understand. ¨ What are you most afraid of that might happen? You might be afraid of having pain, losing your independence, or being kept alive by machines. ¨ Where would you prefer to die? Choices include your home, a hospital, or a nursing home. ¨ Would you like to have information about hospice care to support you and your family? ¨ Do you want to donate organs when you die? ¨ Do you want certain Nondenominational practices performed before you die? If so, put your wishes in the advance directive. · Read your advance directive every year, and make changes as needed. When should you call for help? Be sure to contact your doctor if you have any questions. Where can you learn more? Go to http://darirus-kathryn.info/. Enter R264 in the search box to learn more about \"Advance Directives: Care Instructions. \" Current as of: February 24, 2016 Content Version: 11.1 © 8402-4856 [employer]dy Inc. Care instructions adapted under license by DoorDash (which disclaims liability or warranty for this information). If you have questions about a medical condition or this instruction, always ask your healthcare professional. [address] any warranty or liability for your use of this information. Learning About Colonoscopy What is a colonoscopy? A colonoscopy is a test (also called a procedure) that lets a doctor look inside your large intestine. The doctor uses a thin, lighted tube called a colonoscope. The doctor uses it to look for small growths called polyps, colon or rectal cancer (colorectal cancer), or other problems like bleeding. During the procedure, the doctor can take samples of tissue. The samples can then be checked for cancer or other conditions. The doctor can also take out polyps. How is colonoscopy done? This procedure is done in a doctor's office or a clinic or hospital. You will get medicine to help you relax and not feel pain. Some people find that they do not remember having the test because of the medicine. The doctor gently moves the colonoscope, or scope, through the colon. The scope is also a small video camera. It lets the doctor see the colon and take pictures. A colonoscopy usually takes 30 to 45 minutes. It may take longer if the doctor has to remove polyps. How do you prepare for the procedure?  
You need to clean out your colon before the procedure so the doctor can see all of your colon. You may start the cleaning process a day or two before the test. This depends on which \"colon prep\" your doctor recommends. To clean your colon, you stop eating solid foods and drink only clear liquids. You can have water, tea, coffee, clear juices, clear broths, flavored ice pops, and gelatin (such as Jell-O). Do not drink anything red or purple, such as grape juice or fruit punch. And do not eat red or purple foods, such as grape ice pops or cherry gelatin. The day or night before the procedure, you drink a large amount of a special liquid. This causes loose, frequent stools. You will go to the bathroom a lot. It is very important to drink all of the colon prep liquid. If you have problems drinking the liquid, call your doctor. For many people, the prep is worse than the test. It may be uncomfortable, and you may feel hungry on the clear liquid diet. Some people do not go to work or do their usual activities on the day of the prep. Arrange to have someone take you home after the test. 
What can you expect after a colonoscopy? The nurses will watch you for 1 to 2 hours until the medicines wear off. Then you can go home. You will need a ride. Your doctor will tell you when you can eat and do your usual activities. Your doctor will talk to you about when you will need your next colonoscopy. The results of your test and your risk for colorectal cancer will help your doctor decide how often you need to be checked. Follow-up care is a key part of your treatment and safety. Be sure to make and go to all appointments, and call your doctor if you are having problems. It's also a good idea to know your test results and keep a list of the medicines you take. Where can you learn more? Go to http://darrius-kathryn.info/. Enter J209 in the search box to learn more about \"Learning About Colonoscopy. \" Current as of: July 26, 2016 Content Version: 11.1 © 9660-8343 WorldDesk. Care instructions adapted under license by TissueInformatics (which disclaims liability or warranty for this information). If you have questions about a medical condition or this instruction, always ask your healthcare professional. [address] any warranty or liability for your use of this information. Advance Directives: Care Instructions Your Care Instructions An advance directive is a legal way to state your wishes at the end of your life. It tells your family and your doctor what to do if you can no longer say what you want. There are two main types of advance directives. You can change them any time that your wishes change. · A living will tells your family and your doctor your wishes about life support and other treatment. · A medical power of  lets you name a person to make treatment decisions for you when you can't speak for yourself. This person is called a health care agent. If you do not have an advance directive, decisions about your medical care may be made by a doctor or a  who doesn't know you. It may help to think of an advance directive as a gift to the people who care for you. If you have one, they won't have to make tough decisions by themselves. Follow-up care is a key part of your treatment and safety. Be sure to make and go to all appointments, and call your doctor if you are having problems. It's also a good idea to know your test results and keep a list of the medicines you take. How can you care for yourself at home? · Discuss your wishes with your loved ones and your doctor. This way, there are no surprises. · Many states have a unique form. Or you might use a universal form that has been approved by many states. This kind of form can sometimes be completed and stored online. Your electronic copy will then be available wherever you have a connection to the Internet.  In most cases, doctors will respect your wishes even if you have a form from a different state. · You don't need a  to do an advance directive. But you may want to get legal advice. · Think about these questions when you prepare an advance directive: ¨ Who do you want to make decisions about your medical care if you are not able to? Many people choose a family member, close friend, or doctor. ¨ Do you know enough about life support methods that might be used? If not, talk to your doctor so you understand. ¨ What are you most afraid of that might happen? You might be afraid of having pain, losing your independence, or being kept alive by machines. ¨ Where would you prefer to die? Choices include your home, a hospital, or a nursing home. ¨ Would you like to have information about hospice care to support you and your family? ¨ Do you want to donate organs when you die? ¨ Do you want certain Congregational practices performed before you die? If so, put your wishes in the advance directive. · Read your advance directive every year, and make changes as needed. When should you call for help? Be sure to contact your doctor if you have any questions. Where can you learn more? Go to http://darrius-kathryn.info/. Enter R264 in the search box to learn more about \"Advance Directives: Care Instructions. \" Current as of: February 24, 2016 Content Version: 11.1 © 5489-7606 R + B Group[employer]orporated. Care instructions adapted under license by ControlCircle (which disclaims liability or warranty for this information). If you have questions about a medical condition or this instruction, always ask your healthcare professional. [place] [address] any warranty or liability for your use of this information. Call your insurance company about a participating gastroenterologist and schedule your colonoscopy. Make an appointment through [facility]  [address] [place] [address] for a diabetic eye exam. 
 
 
 
 
  
 Introducing [facility]! Romayne Duster introduces [email or web address] Ã¨â€¹Â±Ã¨â€¦Â¾Ã§Â½â€˜ patient portal. Now you can access parts of your medical record, email your doctor's office, and request medication refills online. 1. In your internet browser, go to [email or web address] 2. Click on the First Time User? Click Here link in the Sign In box. You will see the New Member Sign Up page. 3. Enter your [email or web address] Ã¨â€¹Â±Ã¨â€¦Â¾Ã§Â½â€˜ Access Code exactly as it appears below. You will not need to use this code after youve completed the sign-up process. If you do not sign up before the expiration date, you must request a new code. · [email or web address] Ã¨â€¹Â±Ã¨â€¦Â¾Ã§Â½â€˜ Access Code: [email or web address] Expires: 3/21/2017  3:10 PM 
 
4. Enter the last four digits of your Social Security Number (xxxx) and Date of Birth (mm/dd/yyyy) as indicated and click Submit. You will be taken to the next sign-up page. 5. Create a [email or web address] Ã¨â€¹Â±Ã¨â€¦Â¾Ã§Â½â€˜ ID. This will be your [email or web address] Ã¨â€¹Â±Ã¨â€¦Â¾Ã§Â½â€˜ login ID and cannot be changed, so think of one that is secure and easy to remember. 6. Create a [email or web address] Ã¨â€¹Â±Ã¨â€¦Â¾Ã§Â½â€˜ password. You can change your password at any time. 7. Enter your Password Reset Question and Answer. This can be used at a later time if you forget your password. 8. Enter your e-mail address. You will receive e-mail notification when new information is available in [address] 9. Click Sign Up. You can now view and download portions of your medical record. 10. Click the Download Summary menu link to download a portable copy of your medical information. If you have questions, please visit the Frequently Asked Questions section of the [email or web address] Ã¨â€¹Â±Ã¨â€¦Â¾Ã§Â½â€˜ website. Remember, [email or web address] Ã¨â€¹Â±Ã¨â€¦Â¾Ã§Â½â€˜ is NOT to be used for urgent needs. For medical emergencies, dial 911. Now available from your iPhone and Android! Please provide this summary of care documentation to your next provider. Your primary care clinician is listed as Sander Barnard. If you have any questions after today's visit, please call [phone].

## 2017-02-22 NOTE — ACP (ADVANCE CARE PLANNING)
Invited pt to ACP/HC and she would like to have follow up call to schedule in a couple of weeks.  Josep Fleming RN

## 2017-02-22 NOTE — PROGRESS NOTES
She had a colonoscopy over 10 yr. ago. Can't remember exactly when or with whom. She will call her insurance company and ask who participates and make her appointment for this and call us for referral.    She is to call [facility] [address] [place] [address] and make an appointment for diabetic eye exam and call if she needs a referral.    Reminded her mammogram is due in June 2017. Last Pap smear was 2012 and normal.  Since she is over 65 should not need again. Advised of this. She was advised of all this and had no additional questions. All was documented on her AVS.    Follow up 3 months.

## 2017-03-17 ENCOUNTER — PATIENT OUTREACH (OUTPATIENT)
Dept: FAMILY MEDICINE CLINIC | Age: 67
End: 2017-03-17

## 2017-03-28 ENCOUNTER — PATIENT OUTREACH (OUTPATIENT)
Dept: FAMILY MEDICINE CLINIC | Age: 67
End: 2017-03-28

## 2017-03-28 NOTE — ACP (ADVANCE CARE PLANNING)
I called pt and she is checking with Eric Burk, the person she wants to be her healthcare agent. She has asked me to call her back next week to schedule conversation.  Molly Guerrero RN

## 2017-04-14 ENCOUNTER — TELEPHONE (OUTPATIENT)
Dept: FAMILY MEDICINE CLINIC | Age: 67
End: 2017-04-14

## 2017-04-14 ENCOUNTER — DOCUMENTATION ONLY (OUTPATIENT)
Dept: FAMILY MEDICINE CLINIC | Age: 67
End: 2017-04-14

## 2017-04-14 NOTE — ACP (ADVANCE CARE PLANNING)
I called pt today and she is not ready to meet yet. She asked to call me when ready to meet.  Reji Noguera RN

## 2017-05-09 DIAGNOSIS — R11.0 NAUSEA: ICD-10-CM

## 2017-05-10 RX ORDER — PROMETHAZINE HYDROCHLORIDE 12.5 MG/1
TABLET ORAL
Qty: 30 TAB | Refills: 0 | Status: SHIPPED | OUTPATIENT
Start: 2017-05-10 | End: 2017-10-30

## 2017-06-01 ENCOUNTER — OFFICE VISIT (OUTPATIENT)
Dept: FAMILY MEDICINE CLINIC | Age: 67
End: 2017-06-01

## 2017-06-01 VITALS
SYSTOLIC BLOOD PRESSURE: 158 MMHG | WEIGHT: 167 LBS | HEART RATE: 81 BPM | OXYGEN SATURATION: 93 % | TEMPERATURE: 98.3 F | BODY MASS INDEX: 28.86 KG/M2 | DIASTOLIC BLOOD PRESSURE: 81 MMHG

## 2017-06-01 DIAGNOSIS — B18.2 CHRONIC HEPATITIS C WITHOUT HEPATIC COMA (HCC): ICD-10-CM

## 2017-06-01 DIAGNOSIS — E11.9 CONTROLLED TYPE 2 DIABETES MELLITUS WITHOUT COMPLICATION, WITHOUT LONG-TERM CURRENT USE OF INSULIN (HCC): ICD-10-CM

## 2017-06-01 DIAGNOSIS — J40 BRONCHITIS: Primary | ICD-10-CM

## 2017-06-01 DIAGNOSIS — R19.5 CHANGE IN CONSISTENCY OF STOOL: ICD-10-CM

## 2017-06-01 DIAGNOSIS — R63.5 ABNORMAL WEIGHT GAIN: ICD-10-CM

## 2017-06-01 LAB — GLUCOSE POC: NORMAL MG/DL

## 2017-06-01 RX ORDER — AZITHROMYCIN 250 MG/1
TABLET, FILM COATED ORAL
Qty: 6 TAB | Refills: 0 | Status: SHIPPED | OUTPATIENT
Start: 2017-06-01 | End: 2017-10-30

## 2017-06-01 RX ORDER — ALBUTEROL SULFATE 0.83 MG/ML
2.5 SOLUTION RESPIRATORY (INHALATION) ONCE
Qty: 1 EACH | Refills: 0
Start: 2017-06-01 | End: 2017-06-01

## 2017-06-01 NOTE — PROGRESS NOTES
Coordination of Care  1. Have you been to the ER, urgent care clinic since your last visit? Hospitalized since your last visit? No    2. Have you seen or consulted any other health care providers outside of the [address] [place] [address] since your last visit? Include any pap smears or colon screening.  No    Medications  Medication Reconciliation Performed: no  Patient does need refills     Learning Assessment Complete? yes  Results for orders placed or performed in visit on 06/01/17   AMB POC GLUCOSE BLOOD, BY GLUCOSE MONITORING DEVICE   Result Value Ref Range    Glucose  non fasting mg/dL

## 2017-06-01 NOTE — LETTER
6/1/2017 2:59 PM 
 
Ms. Perla Pickens [address] [facility] 07 [id number] To Whom It May Concern: 
 
Perla Pickens is currently under the care of [address]. She has been a patient of our office for more than 5 years. During this time I have no knowledge or suspicion of her ever having a drug abuse problem. If there are questions or concerns please have the patient contact our office. Sincerely, Gt Tee MD

## 2017-06-01 NOTE — PROGRESS NOTES
HISTORY OF PRESENT ILLNESS  Ben Godinez is a 78 y.o. female. HPI   She is in the afternoon for cough and cold symptoms. Started 2 weeks ago with runny nose and coughing. Has continued to cough a lot and feels \"like I have a ball of phlegm in my chest\" but is not producing much sputum although it is very thick. Thinks she has pneumonia. Has no chest pain. Has some dyspnea. No fever noted. She took a friend's ? steroid pills which helped some. The friend also gave her 2 nebulizer treatments with albuterol which helped temporarily. Now cough is productive of thick mucous. Had some fever initially she thinks but not since. Now feels dyspneic and is coughing a lot. She is interested in pursuing re-treatment of hepatitis C. Has had changes in her BM's lately. They are not as formed as before. She has not noticed any bleeding in the bowel movements. ROS    Patient Active Problem List   Diagnosis Code    Depression F32.9    Diabetes mellitus type 2, controlled ([facility] E11.9    Plantar wart of right foot B07.0    Hepatitis C, chronic (HCC) B18.2    Onychomycosis B35.1    Primary osteoarthritis of both knees M17.0    Pure hypercholesterolemia E78.00    Osteoporosis M81.0    Essential hypertension with goal blood pressure less than 140/90 I10     Current Outpatient Prescriptions   Medication Sig    azithromycin (ZITHROMAX Z-DAVID) 250 mg tablet Take two tablets today then one tablet daily    metFORMIN ER (GLUCOPHAGE XR) 500 mg tablet TAKE 1 TABLET BY MOUTH EVERY DAY WITH DINNER    promethazine (PHENERGAN) 12.5 mg tablet TAKE 1 TAB BY MOUTH EVERY SIX (6) HOURS AS NEEDED FOR NAUSEA.  PARoxetine (PAXIL) 40 mg tablet TAKE 1 TABLET BY MOUTH DAILY.  pravastatin (PRAVACHOL) 40 mg tablet Take 40 mg by mouth nightly.  cyclobenzaprine (FLEXERIL) 10 mg tablet Take 1 Tab by mouth three (3) times daily as needed for Muscle Spasm(s).     ibuprofen (MOTRIN) 600 mg tablet TAKE 1 TAB BY MOUTH EVERY EIGHT (8) HOURS AS NEEDED FOR PAIN.  lisinopril-hydroCHLOROthiazide (PRINZIDE, ZESTORETIC) 10-12.5 mg per tablet TAKE 1 TABLET BY MOUTH EVERY DAY    triamcinolone acetonide (KENALOG) 0.025 % ointment Apply a thin layer to rash on face twice a day.  traMADol (ULTRAM) 50 mg tablet Take 1 Tab by mouth every eight (8) hours as needed for Pain. Max Daily Amount: 150 mg.    Blood-Glucose Meter (TRUE METRIX AIR GLUCOSE METER) misc Use as directed.  glucose blood VI test strips (TRUE METRIX GLUCOSE TEST STRIP) strip Use to check glucose twice a day.  lancets (TRUEPLUS LANCETS) 28 gauge misc Use to check glucose twice a day.  Blood Glucose Control, Low (TRUE METRIX LEVEL 1) soln Use as directed.  alcohol swabs (BD SINGLE USE SWABS REGULAR) padm Use as directed.  Arm Brace (NEOPRENE WRIST SPLINT SUPPORT) misc Apply to right wrist.     No current facility-administered medications for this visit. Visit Vitals    /81 (BP 1 Location: Left arm, BP Patient Position: Sitting)    Pulse 81    Temp 98.3 °F (36.8 °C) (Oral)    Wt 167 lb (75.8 kg)    SpO2 93%    BMI 28.86 kg/m2     Physical Exam   Constitutional: She appears well-developed. No distress. Pulmonary/Chest: No respiratory distress. She has no decreased breath sounds. She has rhonchi (coarse expiratory rhonchi diffusely with coarse inspiratory BS in bases). She has no rales. Coughing frequently. There was increased air movement after the jet aerosol treatment with albuterol. Course breath sounds and rhonchi persist.   Neurological: She is alert. Skin: No rash noted. Vitals reviewed.       Lab Results   Component Value Date/Time    Hemoglobin A1c 6.0 01/25/2017 11:00 AM     Results for orders placed or performed in visit on 06/01/17   TSH 3RD GENERATION   Result Value Ref Range    TSH 0.621 0.450 - 4.500 uIU/mL   AMB POC GLUCOSE BLOOD, BY GLUCOSE MONITORING DEVICE   Result Value Ref Range    Glucose  non fasting mg/dL ASSESSMENT and PLAN    ICD-10-CM ICD-9-CM    1. Bronchitis J40 [address] [place] INHAL RX, AIRWAY OBST/DX SPUTUM INDUCT      azithromycin (ZITHROMAX Z-DAVID) 250 mg tablet      ALBUTEROL, INHAL. SOL., FDA-APPROVED FINAL, NON-COMPOUND UNIT DOSE, 1 MG      INHAL RX, AIRWAY OBST/DX SPUTUM INDUCT   2. Controlled type 2 diabetes mellitus without complication, without long-term current use of insulin (HCC) E11.9 250.00 AMB POC GLUCOSE BLOOD, BY GLUCOSE MONITORING DEVICE   3. Change in consistency of stool R19.5 787.7 REFERRAL TO GASTROENTEROLOGY   4. Abnormal weight gain R63.5 783.1 TSH 3RD GENERATION   5. Chronic hepatitis C without hepatic coma (HCC) B18.2 070.54        She is to notify us and return or go to the ED if she does not improve in the next few days. We will check her TSH at her request as she is concerned about significant weight gain. We will refer to GI for help with getting a repeat colonoscopy that she thinks she is due to have soon and for complaints of change in stool consistency in bowel habits. I also suggested she see Dr. Natalie Ty again to discuss possible retreatment of her hepatitis C. We discussed that this is still possible with the new treatments but is a bit more complicated with previous treatment with interferon. Her diabetes seems to be fairly well controlled but she should return later to discuss that further as we did mostly a sick visit today. Follow-up Disposition:  Return in about 2 months (around 8/1/2017), or if symptoms worsen or fail to improve in 2 weeks.

## 2017-06-01 NOTE — MR AVS SNAPSHOT
Visit Information Date & Time Provider Department Dept. Phone Encounter #  
 6/1/2017  2:15 PM Aida Coe [facility] [phone] [id number] Follow-up Instructions Return in about 2 months (around 8/1/2017), or if symptoms worsen or fail to improve in 2 weeks. Upcoming Health Maintenance Date Due DTaP/Tdap/Td series (1 - Tdap) 12/25/1970 FOBT Q 1 YEAR AGE 50-75 12/25/1999 ZOSTER VACCINE AGE 60> 12/25/2009 EYE EXAM RETINAL OR DILATED Q1 1/1/2013 GLAUCOMA SCREENING Q2Y 12/25/2014 FOOT EXAM Q1 2/9/2016 MICROALBUMIN Q1 5/16/2017 LIPID PANEL Q1 5/16/2017 HEMOGLOBIN A1C Q6M 7/25/2017 INFLUENZA AGE 9 TO ADULT 8/1/2017 Pneumococcal 65+ Low/Medium Risk (2 of 2 - PCV13) 1/25/2018 MEDICARE YEARLY EXAM 2/23/2018 BREAST CANCER SCRN MAMMOGRAM 6/22/2018 Allergies as of 6/1/2017  Review Complete On: 6/1/2017 By: Aida Coe MD  
  
 Severity Noted Reaction Type Reactions Amoxicillin  04/17/2013    Nausea and Vomiting Penicillin G  04/17/2013    Nausea and Vomiting Pt can't remember that reaction it caused. Current Immunizations  Reviewed on 1/25/2017 Name Date Influenza Vaccine (Quad) PF 1/25/2017 Pneumococcal Polysaccharide (PPSV-23) 1/25/2017, 2/9/2015 Not reviewed this visit You Were Diagnosed With   
  
 Codes Comments Bronchitis    -  Primary ICD-10-CM: F67 ICD-9-CM: 838 Controlled type 2 diabetes mellitus without complication, without long-term current use of insulin ([facility]     ICD-10-CM: E11.9 ICD-9-CM: 250.00 Change in consistency of stool     ICD-10-CM: R19.5 ICD-9-CM: 787.7 Abnormal weight gain     ICD-10-CM: R63.5 ICD-9-CM: 783.1 Vitals BP Pulse Temp Weight(growth percentile) SpO2 BMI  
 158/81 (BP 1 Location: Left arm, BP Patient Position: Sitting) 81 98.3 °F (36.8 °C) (Oral) 167 lb (75.8 kg) 93% 28.86 kg/m2 OB Status Smoking Status Postmenopausal Current Every Day Smoker Vitals History BMI and BSA Data Body Mass Index Body Surface Area  
 28.86 kg/m 2 1.85 m 2 Preferred Pharmacy Pharmacy Name Phone [id number]- [place], [address] [place] [address]) [phone] Your Updated Medication List  
  
   
This list is accurate as of: 6/1/17  3:19 PM.  Always use your most recent med list.  
  
  
  
  
 alcohol swabs Padm Commonly known as:  BD Single Use Swabs Regular Use as directed. Arm Brace Misc Commonly known as:  NEOPRENE WRIST SPLINT SUPPORT Apply to right wrist.  
  
 azithromycin 250 mg tablet Commonly known as:  Yuko Rosenberg Take two tablets today then one tablet daily Blood Glucose Control, Low Soln Commonly known as:  TRUE METRIX LEVEL 1 Use as directed. Blood-Glucose Meter Misc Commonly known as:  TRUE METRIX AIR GLUCOSE METER Use as directed. cyclobenzaprine 10 mg tablet Commonly known as:  FLEXERIL Take 1 Tab by mouth three (3) times daily as needed for Muscle Spasm(s). glucose blood VI test strips strip Commonly known as:  TRUE METRIX GLUCOSE TEST STRIP Use to check glucose twice a day. ibuprofen 600 mg tablet Commonly known as:  MOTRIN  
TAKE 1 TAB BY MOUTH EVERY EIGHT (8) HOURS AS NEEDED FOR PAIN. lancets 28 gauge Misc Commonly known as:  Graeme Kawasaki Use to check glucose twice a day. lisinopril-hydroCHLOROthiazide 10-12.5 mg per tablet Commonly known as:  PRINZIDE, ZESTORETIC  
TAKE 1 TABLET BY MOUTH EVERY DAY  
  
 metFORMIN  mg tablet Commonly known as:  GLUCOPHAGE XR  
TAKE 1 TABLET BY MOUTH EVERY DAY WITH DINNER  
  
 PARoxetine 40 mg tablet Commonly known as:  PAXIL TAKE 1 TABLET BY MOUTH DAILY. pravastatin 40 mg tablet Commonly known as:  PRAVACHOL Take 40 mg by mouth nightly. promethazine 12.5 mg tablet Commonly known as:  PHENERGAN  
TAKE 1 TAB BY MOUTH EVERY SIX (6) HOURS AS NEEDED FOR NAUSEA.  
  
 traMADol 50 mg tablet Commonly known as:  ULTRAM  
Take 1 Tab by mouth every eight (8) hours as needed for Pain. Max Daily Amount: 150 mg.  
  
 triamcinolone acetonide 0.025 % ointment Commonly known as:  KENALOG Apply a thin layer to rash on face twice a day. Prescriptions Sent to Pharmacy Refills  
 azithromycin (ZITHROMAX Z-DAVID) 250 mg tablet 0 Sig: Take two tablets today then one tablet daily Class: Normal  
 [id number] [address], [address] [address]) Ph #: [phone] We Performed the Following AMB POC GLUCOSE BLOOD, BY GLUCOSE MONITORING DEVICE [74891 CPT(R)] DC INHAL RX, AIRWAY OBST/DX SPUTUM INDUCT [id number] CPT(R)] REFERRAL TO GASTROENTEROLOGY [ZIF23 Custom] TSH 3RD GENERATION [09460 CPT(R)] Follow-up Instructions Return in about 2 months (around 8/1/2017), or if symptoms worsen or fail to improve in 2 weeks. Referral Information Referral ID Referred By Referred To  
  
 [id number] Jose Finely Not Available Visits Status Start Date End Date 1 New Request 6/1/17 6/1/18 If your referral has a status of pending review or denied, additional information will be sent to support the outcome of this decision. Introducing [facility]! [facility] introduces [employer] patient portal. Now you can access parts of your medical record, email your doctor's office, and request medication refills online. 1. In your internet browser, go to [email or web address]://[email or web address]. [employer] 2. Click on the First Time User? Click Here link in the Sign In box. You will see the New Member Sign Up page. 3. Enter your [employer] Access Code exactly as it appears below. You will not need to use this code after youve completed the sign-up process.  If you do not sign up before the expiration date, you must request a new code. · [employer] Access Code: [facility] Expires: 8/30/2017  3:19 PM 
 
4. Enter the last four digits of your Social Security Number (xxxx) and Date of Birth (mm/dd/yyyy) as indicated and click Submit. You will be taken to the next sign-up page. 5. Create a [employer] ID. This will be your [employer] login ID and cannot be changed, so think of one that is secure and easy to remember. 6. Create a [employer] password. You can change your password at any time. 7. Enter your Password Reset Question and Answer. This can be used at a later time if you forget your password. 8. Enter your e-mail address. You will receive e-mail notification when new information is available in [address] 9. Click Sign Up. You can now view and download portions of your medical record. 10. Click the Download Summary menu link to download a portable copy of your medical information. If you have questions, please visit the Frequently Asked Questions section of the [employer] website. Remember, [employer] is NOT to be used for urgent needs. For medical emergencies, dial 911. Now available from your iPhone and Android! Please provide this summary of care documentation to your next provider. Your primary care clinician is listed as Migdalia Ram. If you have any questions after today's visit, please call [phone].

## 2017-06-05 DIAGNOSIS — E11.9 CONTROLLED TYPE 2 DIABETES MELLITUS WITHOUT COMPLICATION, WITHOUT LONG-TERM CURRENT USE OF INSULIN (HCC): ICD-10-CM

## 2017-06-05 RX ORDER — METFORMIN HYDROCHLORIDE 500 MG/1
TABLET, EXTENDED RELEASE ORAL
Qty: 30 TAB | Refills: 5 | Status: SHIPPED | OUTPATIENT
Start: 2017-06-05 | End: 2018-07-10 | Stop reason: ALTCHOICE

## 2017-06-05 NOTE — TELEPHONE ENCOUNTER
Received incoming fax from patient's [facility] pharmacy requesting a refill for her Metformin ER 500mg tablet. Per chart review, the patient was last seen at [address] on 06-01-17. Routing to Dr. Nabor Gong for review.  Cheryl Baer RN

## 2017-06-29 DIAGNOSIS — F33.1 MODERATE EPISODE OF RECURRENT MAJOR DEPRESSIVE DISORDER (HCC): ICD-10-CM

## 2017-06-29 RX ORDER — PAROXETINE HYDROCHLORIDE 40 MG/1
TABLET, FILM COATED ORAL
Qty: 90 TAB | Refills: 1 | Status: SHIPPED | OUTPATIENT
Start: 2017-06-29 | End: 2017-12-19 | Stop reason: SDUPTHER

## 2017-07-01 DIAGNOSIS — I10 ESSENTIAL HYPERTENSION WITH GOAL BLOOD PRESSURE LESS THAN 140/90: ICD-10-CM

## 2017-07-01 DIAGNOSIS — M54.2 NECK PAIN: ICD-10-CM

## 2017-07-01 DIAGNOSIS — M17.0 PRIMARY OSTEOARTHRITIS OF BOTH KNEES: ICD-10-CM

## 2017-07-03 RX ORDER — LISINOPRIL AND HYDROCHLOROTHIAZIDE 10; 12.5 MG/1; MG/1
TABLET ORAL
Qty: 90 TAB | Refills: 1 | Status: SHIPPED | OUTPATIENT
Start: 2017-07-03 | End: 2018-06-07 | Stop reason: SDUPTHER

## 2017-07-03 RX ORDER — IBUPROFEN 600 MG/1
TABLET ORAL
Qty: 60 TAB | Refills: 2 | Status: SHIPPED | OUTPATIENT
Start: 2017-07-03 | End: 2017-12-19 | Stop reason: SDUPTHER

## 2017-07-13 LAB — TSH SERPL DL<=0.005 MIU/L-ACNC: 0.62 UIU/ML (ref 0.45–4.5)

## 2017-08-05 DIAGNOSIS — M79.10 MUSCLE PAIN: ICD-10-CM

## 2017-08-08 RX ORDER — CYCLOBENZAPRINE HCL 10 MG
TABLET ORAL
Qty: 30 TAB | Refills: 2 | Status: SHIPPED | OUTPATIENT
Start: 2017-08-08 | End: 2017-11-06 | Stop reason: SDUPTHER

## 2017-11-01 ENCOUNTER — OFFICE VISIT (OUTPATIENT)
Dept: FAMILY MEDICINE CLINIC | Age: 67
End: 2017-11-01

## 2017-11-01 ENCOUNTER — HOSPITAL ENCOUNTER (OUTPATIENT)
Dept: LAB | Age: 67
Discharge: HOME | End: 2017-11-01

## 2017-11-01 VITALS
OXYGEN SATURATION: 96 % | HEIGHT: 64 IN | DIASTOLIC BLOOD PRESSURE: 88 MMHG | BODY MASS INDEX: 28.79 KG/M2 | SYSTOLIC BLOOD PRESSURE: 166 MMHG | WEIGHT: 168.6 LBS | HEART RATE: 74 BPM | TEMPERATURE: 98.1 F

## 2017-11-01 DIAGNOSIS — R35.0 URINARY FREQUENCY: ICD-10-CM

## 2017-11-01 DIAGNOSIS — Z13.1 SCREENING FOR DIABETES MELLITUS (DM): Primary | ICD-10-CM

## 2017-11-01 DIAGNOSIS — B18.2 HEP C W/O COMA, CHRONIC (HCC): ICD-10-CM

## 2017-11-01 DIAGNOSIS — I10 ESSENTIAL HYPERTENSION WITH GOAL BLOOD PRESSURE LESS THAN 140/90: ICD-10-CM

## 2017-11-01 DIAGNOSIS — R15.9 RECTAL SPHINCTER INCONTINENCE: ICD-10-CM

## 2017-11-01 DIAGNOSIS — Z23 ENCOUNTER FOR IMMUNIZATION: ICD-10-CM

## 2017-11-01 DIAGNOSIS — R31.9 HEMATURIA, UNSPECIFIED TYPE: ICD-10-CM

## 2017-11-01 DIAGNOSIS — Z12.11 SCREENING FOR COLON CANCER: ICD-10-CM

## 2017-11-01 LAB
ALBUMIN SERPL-MCNC: 3.6 G/DL (ref 3.5–5)
ALBUMIN/GLOB SERPL: 1.1 {RATIO} (ref 1.1–2.2)
ALP SERPL-CCNC: 112 U/L (ref 45–117)
ALT SERPL-CCNC: 55 U/L (ref 12–78)
ANION GAP SERPL CALC-SCNC: 7 MMOL/L (ref 5–15)
APPEARANCE UR: ABNORMAL
AST SERPL-CCNC: 43 U/L (ref 15–37)
BACTERIA URNS QL MICRO: ABNORMAL /HPF
BILIRUB SERPL-MCNC: 0.9 MG/DL (ref 0.2–1)
BILIRUB UR QL STRIP: NORMAL
BILIRUB UR QL: NEGATIVE
BUN SERPL-MCNC: 11 MG/DL (ref 6–20)
BUN/CREAT SERPL: 24 (ref 12–20)
CALCIUM SERPL-MCNC: 9 MG/DL (ref 8.5–10.1)
CHLORIDE SERPL-SCNC: 105 MMOL/L (ref 97–108)
CO2 SERPL-SCNC: 30 MMOL/L (ref 21–32)
COLOR UR: ABNORMAL
CREAT SERPL-MCNC: 0.46 MG/DL (ref 0.55–1.02)
EPITH CASTS URNS QL MICRO: ABNORMAL /LPF
GLOBULIN SER CALC-MCNC: 3.2 G/DL (ref 2–4)
GLUCOSE POC: NORMAL MG/DL
GLUCOSE SERPL-MCNC: 96 MG/DL (ref 65–100)
GLUCOSE UR STRIP.AUTO-MCNC: NEGATIVE MG/DL
GLUCOSE UR-MCNC: NEGATIVE MG/DL
HGB UR QL STRIP: NEGATIVE
KETONES P FAST UR STRIP-MCNC: NEGATIVE MG/DL
KETONES UR QL STRIP.AUTO: NEGATIVE MG/DL
LEUKOCYTE ESTERASE UR QL STRIP.AUTO: NEGATIVE
MUCOUS THREADS URNS QL MICRO: ABNORMAL /LPF
NITRITE UR QL STRIP.AUTO: NEGATIVE
PH UR STRIP: 7.5 [PH] (ref 4.6–8)
PH UR STRIP: 7.5 [PH] (ref 5–8)
POTASSIUM SERPL-SCNC: 4.5 MMOL/L (ref 3.5–5.1)
PROT SERPL-MCNC: 6.8 G/DL (ref 6.4–8.2)
PROT UR QL STRIP: NEGATIVE MG/DL
PROT UR STRIP-MCNC: NEGATIVE MG/DL
RBC #/AREA URNS HPF: ABNORMAL /HPF (ref 0–5)
SODIUM SERPL-SCNC: 142 MMOL/L (ref 136–145)
SP GR UR REFRACTOMETRY: 1.02 (ref 1–1.03)
SP GR UR STRIP: 1.02 (ref 1–1.03)
UA UROBILINOGEN AMB POC: NORMAL (ref 0.2–1)
URINALYSIS CLARITY POC: CLEAR
URINALYSIS COLOR POC: YELLOW
URINE BLOOD POC: NORMAL
URINE LEUKOCYTES POC: NEGATIVE
URINE NITRITES POC: NEGATIVE
UROBILINOGEN UR QL STRIP.AUTO: 1 EU/DL (ref 0.2–1)
WBC URNS QL MICRO: ABNORMAL /HPF (ref 0–4)
YEAST URNS QL MICRO: PRESENT

## 2017-11-01 PROCEDURE — 80053 COMPREHEN METABOLIC PANEL: CPT | Performed by: FAMILY MEDICINE

## 2017-11-01 PROCEDURE — 82105 ALPHA-FETOPROTEIN SERUM: CPT | Performed by: FAMILY MEDICINE

## 2017-11-01 PROCEDURE — 81003 URINALYSIS AUTO W/O SCOPE: CPT | Performed by: FAMILY MEDICINE

## 2017-11-01 NOTE — PROGRESS NOTES
Coordination of Care  1. Have you been to the ER, urgent care clinic since your last visit? Hospitalized since your last visit? No    2. Have you seen or consulted any other health care providers outside of the [address] [place] [address] since your last visit? Include any pap smears or colon screening. No    Medications  Does the patient need refills? YES    Learning Assessment Complete?  yes
Requests flu vaccine; denies fever, egg allergy. Immunization given per protocol and [facility] V[facility] information sheet given, explained possible S/E. Reviewed sx indicating need to be seen in ER. Pt had no adverse reaction at time of discharge.  Chelita Wren RN
Results for orders placed or performed in visit on 11/01/17   AMB POC GLUCOSE BLOOD, BY GLUCOSE MONITORING DEVICE   Result Value Ref Range    Glucose  NF mg/dL   AMB POC URINALYSIS DIP STICK MANUAL W/O MICRO   Result Value Ref Range    Color (UA POC) Yellow     Clarity (UA POC) Clear     Glucose (UA POC) Negative Negative    Bilirubin (UA POC) 1+ Negative    Ketones (UA POC) Negative Negative    Specific gravity (UA POC) 1.020 1.001 - 1.035    Blood (UA POC) 1+ Negative    pH (UA POC) 7.5 4.6 - 8.0    Protein (UA POC) Negative Negative mg/dL    Urobilinogen (UA POC) 4 mg/dL 0.2 - 1    Nitrites (UA POC) Negative Negative    Leukocyte esterase (UA POC) Negative Negative
and will check A1C then.

## 2017-11-03 LAB — AFP-TM SERPL-MCNC: 10.1 NG/ML (ref 0–8.3)

## 2017-11-06 ENCOUNTER — TELEPHONE (OUTPATIENT)
Dept: FAMILY MEDICINE CLINIC | Age: 67
End: 2017-11-06

## 2017-11-06 DIAGNOSIS — M79.10 MUSCLE PAIN: ICD-10-CM

## 2017-11-06 RX ORDER — CYCLOBENZAPRINE HCL 10 MG
TABLET ORAL
Qty: 30 TAB | Refills: 0 | Status: SHIPPED | OUTPATIENT
Start: 2017-11-06 | End: 2017-12-19 | Stop reason: SDUPTHER

## 2017-11-06 NOTE — TELEPHONE ENCOUNTER
The patient left a message on the office voicemail on Friday, 11-03-17, requesting a refill of Flexeril. She stated that she saw Dr. Rene Solo last week and forgot to ask her for the refill. It was originally prescribed by Dr. Elan Lopez. Routing to Dr. Rene Solo for review.  Nathan Lester RN

## 2017-11-13 NOTE — TELEPHONE ENCOUNTER
Called patient and [facility] pharmacy and patient unable to receive the Nicotine patches for smoking cessation [facility] needs [facility] dosing instructions. What dose does provider want to start out with and more specific instructions as per pharmacist at [phone]/.

## 2017-11-15 NOTE — TELEPHONE ENCOUNTER
[facility] pharmacist was called, and per verbal order from Dr Mike Hallman, patient should take the prescribed nicotine patches as follows \"She should use 21 mg daily for 2-4 weeks, then 14mg daily 2-4 weeks, then 7mg daily 2-4 weeks, then stop. \" Pharmacy will prepare this for the patient using three separate prescriptions for the 21mg, the 14mg dose and the 7mg dose 4 weeks each. Patient was notified over phone re: tapering dose. Patient asking about her lab results, blood and urine results.

## 2017-11-17 DIAGNOSIS — B18.2 CARRIER OF VIRAL HEPATITIS C (HCC): Primary | ICD-10-CM

## 2017-11-17 NOTE — TELEPHONE ENCOUNTER
Patient left message that she is now home and Dr. Jules Bateman could call her about her blood work. Sending to Junito Bateman.     Robin Sykes April

## 2017-11-17 NOTE — TELEPHONE ENCOUNTER
Thanks, somehow results slipped past me. I called pt and discussed abnl lfts. Also asked when she last saw Dr. Yue Vargas and she reports it was when he was at [address] [place] [address], which is probably at least 8-10 years ago. I will check RUQ U/s and we discussed that it might be appropriate for her to see him again, especially since the tx has improved remarkably over the last several years.

## 2017-11-21 NOTE — TELEPHONE ENCOUNTER
Pharmacy was notified last week about strength and dosing instructions as per Dr Jolene Dyer verbal order see previous notes.

## 2017-11-25 ENCOUNTER — HOSPITAL ENCOUNTER (OUTPATIENT)
Dept: ULTRASOUND IMAGING | Age: 67
Discharge: HOME | End: 2017-11-25
Attending: FAMILY MEDICINE
Payer: MEDICARE

## 2017-11-25 DIAGNOSIS — B18.2 CARRIER OF VIRAL HEPATITIS C (HCC): ICD-10-CM

## 2017-11-25 PROCEDURE — 76705 ECHO EXAM OF ABDOMEN: CPT

## 2017-12-01 NOTE — PROGRESS NOTES
I called the pt to discuss the mildly elevated afp with reassuring liver U/s result, but did recommend she see Dr. Cleveland Jerez. She is agreeable. I gave her the number of [facility]  so she can get that number and if she needs help with the referral is to call me back.

## 2017-12-19 ENCOUNTER — OFFICE VISIT (OUTPATIENT)
Dept: FAMILY MEDICINE CLINIC | Age: 67
End: 2017-12-19

## 2017-12-19 VITALS
TEMPERATURE: 98.2 F | SYSTOLIC BLOOD PRESSURE: 113 MMHG | HEART RATE: 103 BPM | BODY MASS INDEX: 27.83 KG/M2 | DIASTOLIC BLOOD PRESSURE: 80 MMHG | HEIGHT: 64 IN | WEIGHT: 163 LBS

## 2017-12-19 DIAGNOSIS — M17.0 PRIMARY OSTEOARTHRITIS OF BOTH KNEES: ICD-10-CM

## 2017-12-19 DIAGNOSIS — M79.10 MUSCLE PAIN: ICD-10-CM

## 2017-12-19 DIAGNOSIS — M54.2 NECK PAIN: ICD-10-CM

## 2017-12-19 DIAGNOSIS — J01.10 ACUTE FRONTAL SINUSITIS, RECURRENCE NOT SPECIFIED: Primary | ICD-10-CM

## 2017-12-19 DIAGNOSIS — F33.1 MODERATE EPISODE OF RECURRENT MAJOR DEPRESSIVE DISORDER (HCC): ICD-10-CM

## 2017-12-19 RX ORDER — DOXYCYCLINE 100 MG/1
100 CAPSULE ORAL 2 TIMES DAILY
Qty: 14 CAP | Refills: 0 | Status: SHIPPED | OUTPATIENT
Start: 2017-12-19 | End: 2017-12-26

## 2017-12-19 RX ORDER — CYCLOBENZAPRINE HCL 10 MG
TABLET ORAL
Qty: 30 TAB | Refills: 1 | Status: SHIPPED | OUTPATIENT
Start: 2017-12-19 | End: 2018-02-24 | Stop reason: SDUPTHER

## 2017-12-19 RX ORDER — PAROXETINE HYDROCHLORIDE 40 MG/1
TABLET, FILM COATED ORAL
Qty: 90 TAB | Refills: 1 | Status: SHIPPED | OUTPATIENT
Start: 2017-12-19 | End: 2018-06-21 | Stop reason: SDUPTHER

## 2017-12-19 RX ORDER — IBUPROFEN 600 MG/1
TABLET ORAL
Qty: 60 TAB | Refills: 2 | Status: SHIPPED | OUTPATIENT
Start: 2017-12-19 | End: 2018-03-19 | Stop reason: SDUPTHER

## 2017-12-19 NOTE — PROGRESS NOTES
Subjective:     Chief Complaint   Patient presents with    Eye Drainage     runny nose, ear pain, going on for 3 wks        She  is a 78 y.o. female who presents for evaluation of URI S&S. Pt reports approx 3-4 weeks ago, Pt notes she developed flu like S&S w/ joint pains and fevers. Onset 1 week ago, Pt notes she had bilateral, purulent eye discharge. No new fevers, cough, JESSICA/SOB, near syncope nor palpitations/tachycardia. Pt notes continued tactile fevers/chills. Notes she tookd friends Abx for 2-3 days. Unsure of what Abx she took, but it was a \"sinus one\" she took them BID. Felt like it improved her S&S. In the last 1-2 weeks ago, her ears started to hurt. Attempted remedies w/ OTC cough syrup which did not help. ROS  Gen - no fever/chills  Resp - no dyspnea or cough  CV - no chest pain or JESSICA  Rest per HPI    Past Medical History:   Diagnosis Date    Diabetes ([facility]     Gastrointestinal disorder     History of mammography, screening 2013    Hypertension     Pap smear for cervical cancer screening 2013     Past Surgical History:   Procedure Laterality Date    HX ORTHOPAEDIC Right 2012    right knee medial meniscus surgery     Current Outpatient Prescriptions on File Prior to Visit   Medication Sig Dispense Refill    cyclobenzaprine (FLEXERIL) 10 mg tablet TAKE 1 TABLET BY MOUTH 3 TIMES A DAY AS NEEDED FOR MUSCLE SPASMS 30 Tab 0    lisinopril-hydroCHLOROthiazide (PRINZIDE, ZESTORETIC) 10-12.5 mg per tablet TAKE 1 TABLET BY MOUTH EVERY DAY 90 Tab 1    ibuprofen (MOTRIN) 600 mg tablet TAKE 1 TABLET BY MOUTH EVERY 8 HOURS AS NEEDED FOR PAIN 60 Tab 2    PARoxetine (PAXIL) 40 mg tablet TAKE 1 TABLET BY MOUTH EVERY DAY 90 Tab 1    triamcinolone acetonide (KENALOG) 0.025 % ointment Apply a thin layer to rash on face twice a day. 15 g 0    lancets (TRUEPLUS LANCETS) 28 gauge misc Use to check glucose twice a day.  100 Units 5    metFORMIN ER (GLUCOPHAGE XR) 500 mg tablet TAKE 1 TABLET BY MOUTH EVERY DAY WITH DINNER 30 Tab 5    pravastatin (PRAVACHOL) 40 mg tablet Take 40 mg by mouth nightly.  Blood-Glucose Meter (TRUE METRIX AIR GLUCOSE METER) misc Use as directed. 1 Each 0    glucose blood VI test strips (TRUE METRIX GLUCOSE TEST STRIP) strip Use to check glucose twice a day. 100 Strip 5    Blood Glucose Control, Low (TRUE METRIX LEVEL 1) soln Use as directed. 1 Each 2    alcohol swabs (BD SINGLE USE SWABS REGULAR) padm Use as directed. 100 Pad 5    Arm Brace (NEOPRENE WRIST SPLINT SUPPORT) misc Apply to right wrist. 1 Each 0     No current facility-administered medications on file prior to visit. Objective:     Vitals:    12/19/17 1447 12/19/17 1452   BP: (!) 126/98 113/80   Pulse: 98 (!) 103   Temp: 98.2 °F (36.8 °C)    TempSrc: Oral    Weight: 163 lb (73.9 kg)    Height: 5' 4\" (1.626 m)        Physical Examination:  General appearance - alert, well appearing, and in no distress  Eyes -sclera anicteric  Neck - supple, no significant adenopathy, no thyromegaly  Chest - clear to auscultation, no wheezes, rales or rhonchi, symmetric air entry  Heart - normal rate, regular rhythm, normal S1, S2, no murmurs, rubs, clicks or gallops  Neurological - alert, oriented, no focal findings or movement disorder noted    + sinus tenderness and R sub-shiv lymph node is palpable, otherwise HENT WNL. Assessment/ Plan:   Diagnoses and all orders for this visit:    1. Acute frontal sinusitis, recurrence not specified  -     doxycycline (VIBRAMYCIN) 100 mg capsule; Take 1 Cap by mouth two (2) times a day for 7 days. 2. Muscle pain  -     cyclobenzaprine (FLEXERIL) 10 mg tablet; TAKE 1 TABLET BY MOUTH 3 TIMES A DAY AS NEEDED FOR MUSCLE SPASMS    3. Neck pain  -     ibuprofen (MOTRIN) 600 mg tablet; TAKE 1 TABLET BY MOUTH EVERY 8 HOURS AS NEEDED FOR PAIN    4.  Primary osteoarthritis of both knees  -     ibuprofen (MOTRIN) 600 mg tablet; TAKE 1 TABLET BY MOUTH EVERY 8 HOURS AS NEEDED FOR PAIN    5. Moderate episode of recurrent major depressive disorder (HCC)  -     PARoxetine (PAXIL) 40 mg tablet; TAKE 1 TABLET BY MOUTH EVERY DAY       Given myriad and Hx of S&S w/ exam findings, recommend starting Abx. Also recommend saline nasal spray and gargles. Refill Rx per Pt request.     RTC PRN if no improvement in S&S despite Abx and/or if S&S worsen suddenly, advised to go to ER. I have discussed the diagnosis with the patient and the intended plan as seen in the above orders. The patient has received an after-visit summary and questions were answered concerning future plans. I have discussed medication side effects and warnings with the patient as well. The patient verbalizes understanding and agreement with the plan.     Follow-up Disposition: Not on File

## 2017-12-19 NOTE — PATIENT INSTRUCTIONS
Saline Nasal Washes: Care Instructions  Your Care Instructions  Saline nasal washes help keep the nasal passages open by washing out thick or dried mucus. This simple remedy can help relieve symptoms of allergies, sinusitis, and colds. It also can make the nose feel more comfortable by keeping the mucous membranes moist. You may notice a little burning sensation in your nose the first few times you use the solution, but this usually gets better in a few days. Follow-up care is a key part of your treatment and safety. Be sure to make and go to all appointments, and call your doctor if you are having problems. It's also a good idea to know your test results and keep a list of the medicines you take. How can you care for yourself at home? · You can buy premixed saline solution in a squeeze bottle or other sinus rinse products at a drugstore. Read and follow the instructions on the label. · You also can make your own saline solution by adding 1 teaspoon of salt and 1 teaspoon of baking soda to 2 cups of distilled water. · If you use a homemade solution, pour a small amount into a clean bowl. Using a rubber bulb syringe, squeeze the syringe and place the tip in the salt water. Pull a small amount of the salt water into the syringe by relaxing your hand. · Sit down with your head tilted slightly back. Do not lie down. Put the tip of the bulb syringe or the squeeze bottle a little way into one of your nostrils. Gently drip or squirt a few drops into the nostril. Repeat with the other nostril. Some sneezing and gagging are normal at first.  · Gently blow your nose. · Wipe the syringe or bottle tip clean after each use. · Repeat this 2 or 3 times a day. · Use nasal washes gently if you have nosebleeds often. When should you call for help? Watch closely for changes in your health, and be sure to contact your doctor if:  ? · You often get nosebleeds. ? · You have problems doing the nasal washes.    Where can you learn more? Go to http://darrius-kathryn.info/. Enter [id number] in the search box to learn more about \"Saline Nasal Washes: Care Instructions. \"  Current as of: May 12, 2017  Content Version: 11.4  © 8647-2840 Healthwise, [employer]. Care instructions adapted under license by [employer] (which disclaims liability or warranty for this information). If you have questions about a medical condition or this instruction, always ask your healthcare professional. [address] any warranty or liability for your use of this information.

## 2017-12-19 NOTE — MR AVS SNAPSHOT
Visit Information Date & Time Provider Department Dept. Phone Encounter #  
 12/19/2017  2:55 PM Ibrahima Sandhu [facility] [phone] [id number] Follow-up Instructions Return if symptoms worsen or fail to improve. Upcoming Health Maintenance Date Due DTaP/Tdap/Td series (1 - Tdap) 12/25/1970 FOBT Q 1 YEAR AGE 50-75 12/25/1999 ZOSTER VACCINE AGE 60> 10/25/2009 EYE EXAM RETINAL OR DILATED Q1 1/1/2013 GLAUCOMA SCREENING Q2Y 12/25/2014 FOOT EXAM Q1 2/9/2016 MICROALBUMIN Q1 5/16/2017 LIPID PANEL Q1 5/16/2017 HEMOGLOBIN A1C Q6M 7/25/2017 Pneumococcal 65+ Low/Medium Risk (2 of 2 - PCV13) 1/25/2018 MEDICARE YEARLY EXAM 2/23/2018 Allergies as of 12/19/2017  Review Complete On: 12/19/2017 By: Ibrahima Sandhu NP Severity Noted Reaction Type Reactions Amoxicillin  04/17/2013    Nausea and Vomiting Penicillin G  04/17/2013    Nausea and Vomiting Pt can't remember that reaction it caused. Current Immunizations  Reviewed on 1/25/2017 Name Date Influenza Vaccine (Quad) PF 11/1/2017, 1/25/2017 Pneumococcal Polysaccharide (PPSV-23) 1/25/2017, 2/9/2015 Not reviewed this visit You Were Diagnosed With   
  
 Codes Comments Acute frontal sinusitis, recurrence not specified    -  Primary ICD-10-CM: J01.10 ICD-9-CM: 808.2 Muscle pain     ICD-10-CM: M79.1 ICD-9-CM: 729.1 Neck pain     ICD-10-CM: M54.2 ICD-9-CM: 723.1 Primary osteoarthritis of both knees     ICD-10-CM: M17.0 ICD-9-CM: 715.16 Vitals BP Pulse Temp Height(growth percentile) Weight(growth percentile) BMI  
 113/80 (BP 1 Location: Right arm, BP Patient Position: Sitting) (!) 103 98.2 °F (36.8 °C) (Oral) 5' 4\" (1.626 m) 163 lb (73.9 kg) 27.98 kg/m2 OB Status Smoking Status Postmenopausal Current Every Day Smoker Vitals History BMI and BSA Data Body Mass Index Body Surface Area 27.98 kg/m 2 1.83 m 2 Preferred Pharmacy Pharmacy Name Phone [facility]/PHARMACY #[id number]- [place], [address] ([place]) [phone] Your Updated Medication List  
  
   
This list is accurate as of: 12/19/17  3:35 PM.  Always use your most recent med list.  
  
  
  
  
 alcohol swabs Padm Commonly known as:  BD Single Use Swabs Regular Use as directed. Arm Brace Misc Commonly known as:  NEOPRENE WRIST SPLINT SUPPORT Apply to right wrist.  
  
 Blood Glucose Control, Low Soln Commonly known as:  TRUE METRIX LEVEL 1 Use as directed. Blood-Glucose Meter Misc Commonly known as:  TRUE METRIX AIR GLUCOSE METER Use as directed. cyclobenzaprine 10 mg tablet Commonly known as:  FLEXERIL  
TAKE 1 TABLET BY MOUTH 3 TIMES A DAY AS NEEDED FOR MUSCLE SPASMS  
  
 doxycycline 100 mg capsule Commonly known as:  VIBRAMYCIN Take 1 Cap by mouth two (2) times a day for 7 days. glucose blood VI test strips strip Commonly known as:  TRUE METRIX GLUCOSE TEST STRIP Use to check glucose twice a day. ibuprofen 600 mg tablet Commonly known as:  MOTRIN  
TAKE 1 TABLET BY MOUTH EVERY 8 HOURS AS NEEDED FOR PAIN  
  
 lancets 28 gauge Misc Commonly known as:  K[place] Use to check glucose twice a day. lisinopril-hydroCHLOROthiazide 10-12.5 mg per tablet Commonly known as:  PRINZIDE, ZESTORETIC  
TAKE 1 TABLET BY MOUTH EVERY DAY  
  
 metFORMIN  mg tablet Commonly known as:  GLUCOPHAGE XR  
TAKE 1 TABLET BY MOUTH EVERY DAY WITH DINNER  
  
 pravastatin 40 mg tablet Commonly known as:  PRAVACHOL Take 40 mg by mouth nightly. triamcinolone acetonide 0.025 % ointment Commonly known as:  KENALOG Apply a thin layer to rash on face twice a day. Prescriptions Sent to Pharmacy  Refills  
 doxycycline (VIBRAMYCIN) 100 mg capsule 0  
 Sig: Take 1 Cap by mouth two (2) times a day for 7 days. Class: Normal  
 Pharmacy: [facility]/pharmacy #[id number]- [address] [facility], [address] ([place] [address]) Ph #: [phone] Route: Oral  
 cyclobenzaprine (FLEXERIL) 10 mg tablet 1 Sig: TAKE 1 TABLET BY MOUTH 3 TIMES A DAY AS NEEDED FOR MUSCLE SPASMS Class: Normal  
 [place] [facility] [place]: [phone]  
 ibuprofen (MOTRIN) 600 mg tablet 2 Sig: TAKE 1 TABLET BY MOUTH EVERY 8 HOURS AS NEEDED FOR PAIN Class: Normal  
 Pharmacy: [facility]/pharmacy #[id number]- [address] [facility], [address] ([place] [address]) Ph #: [phone] Follow-up Instructions Return if symptoms worsen or fail to improve. Patient Instructions Saline Nasal Washes: Care Instructions Your Care Instructions Saline nasal washes help keep the nasal passages open by washing out thick or dried mucus. This simple remedy can help relieve symptoms of allergies, sinusitis, and colds. It also can make the nose feel more comfortable by keeping the mucous membranes moist. You may notice a little burning sensation in your nose the first few times you use the solution, but this usually gets better in a few days. Follow-up care is a key part of your treatment and safety. Be sure to make and go to all appointments, and call your doctor if you are having problems. It's also a good idea to know your test results and keep a list of the medicines you take. How can you care for yourself at home? · You can buy premixed saline solution in a squeeze bottle or other sinus rinse products at a drugstore. Read and follow the instructions on the label. · You also can make your own saline solution by adding 1 teaspoon of salt and 1 teaspoon of baking soda to 2 cups of distilled water. · If you use a homemade solution, pour a small amount into a clean bowl. Using a rubber bulb syringe, squeeze the syringe and place the tip in the salt water. Pull a small amount of the salt water into the syringe by relaxing your hand. · Sit down with your head tilted slightly back. Do not lie down. Put the tip of the bulb syringe or the squeeze bottle a little way into one of your nostrils. Gently drip or squirt a few drops into the nostril. Repeat with the other nostril. Some sneezing and gagging are normal at first. 
· Gently blow your nose. · Wipe the syringe or bottle tip clean after each use. · Repeat this 2 or 3 times a day. · Use nasal washes gently if you have nosebleeds often. When should you call for help? Watch closely for changes in your health, and be sure to contact your doctor if: 
? · You often get nosebleeds. ? · You have problems doing the nasal washes. Where can you learn more? Go to http://darrius-kathryn.info/. Enter [phone] in the search box to learn more about \"Saline Nasal Washes: Care Instructions. \" Current as of: May 12, 2017 Content Version: 11.4 © 8559-1502 [employer]. Care instructions adapted under license by [employer] (which disclaims liability or warranty for this information). If you have questions about a medical condition or this instruction, always ask your healthcare professional. [place] [address] any warranty or liability for your use of this information. Introducing hospitals & HEALTH SERVICES! Maye Finney introduces 123ContactForm patient portal. Now you can access parts of your medical record, email your doctor's office, and request medication refills online. 1. In your internet browser, go to [email or web address] 2. Click on the First Time User? Click Here link in the Sign In box. You will see the New Member Sign Up page. 3. Enter your Saygent Access Code exactly as it appears below. You will not need to use this code after youve completed the sign-up process. If you do not sign up before the expiration date, you must request a new code. · Saygent Access Code: [email or web address] Expires: 2/20/2018  3:12 PM 
 
4. Enter the last four digits of your Social Security Number (xxxx) and Date of Birth (mm/dd/yyyy) as indicated and click Submit. You will be taken to the next sign-up page. 5. Create a Sisasat ID. This will be your Saygent login ID and cannot be changed, so think of one that is secure and easy to remember. 6. Create a Saygent password. You can change your password at any time. 7. Enter your Password Reset Question and Answer. This can be used at a later time if you forget your password. 8. Enter your e-mail address. You will receive e-mail notification when new information is available in [address]. 9. Click Sign Up. You can now view and download portions of your medical record. 10. Click the Download Summary menu link to download a portable copy of your medical information. If you have questions, please visit the Frequently Asked Questions section of the Saygent website. Remember, Saygent is NOT to be used for urgent needs. For medical emergencies, dial 911. Now available from your iPhone and Android! Please provide this summary of care documentation to your next provider. Your primary care clinician is listed as Perla Alegre. If you have any questions after today's visit, please call [phone].

## 2017-12-19 NOTE — PROGRESS NOTES
Chief Complaint   Patient presents with    Eye Drainage     runny nose, ear pain, going on for 3 wks     Coordination of Care  1. Have you been to the ER, urgent care clinic since your last visit? Hospitalized since your last visit? No    2. Have you seen or consulted any other health care providers outside of the [address] [place] [address] since your last visit? Include any pap smears or colon screening. No    Medications  Does the patient need refills? NO    Learning Assessment Complete?  yes

## 2018-01-02 RX ORDER — NICOTINE 7MG/24HR
PATCH, TRANSDERMAL 24 HOURS TRANSDERMAL
Qty: 28 PATCH | Refills: 0 | Status: ON HOLD | OUTPATIENT
Start: 2018-01-02 | End: 2021-09-01

## 2018-02-24 DIAGNOSIS — M79.10 MUSCLE PAIN: ICD-10-CM

## 2018-02-27 RX ORDER — CYCLOBENZAPRINE HCL 10 MG
TABLET ORAL
Qty: 30 TAB | Refills: 1 | Status: SHIPPED | OUTPATIENT
Start: 2018-02-27 | End: 2018-07-23 | Stop reason: SDUPTHER

## 2018-03-19 DIAGNOSIS — M54.2 NECK PAIN: ICD-10-CM

## 2018-03-19 DIAGNOSIS — M17.0 PRIMARY OSTEOARTHRITIS OF BOTH KNEES: ICD-10-CM

## 2018-03-19 RX ORDER — IBUPROFEN 600 MG/1
TABLET ORAL
Qty: 60 TAB | Refills: 2 | Status: SHIPPED | OUTPATIENT
Start: 2018-03-19 | End: 2018-07-23 | Stop reason: SDUPTHER

## 2018-06-07 DIAGNOSIS — I10 ESSENTIAL HYPERTENSION WITH GOAL BLOOD PRESSURE LESS THAN 140/90: ICD-10-CM

## 2018-06-07 RX ORDER — LISINOPRIL AND HYDROCHLOROTHIAZIDE 10; 12.5 MG/1; MG/1
TABLET ORAL
Qty: 90 TAB | Refills: 1 | Status: SHIPPED | OUTPATIENT
Start: 2018-06-07 | End: 2018-07-23 | Stop reason: SDUPTHER

## 2018-06-07 NOTE — TELEPHONE ENCOUNTER
Fax from [facility] pharmacy for refill on Lisinopril-HCTZ 10-12.5 mg take 1 tab daily disp 90. Chart review shows LOV 12/19/17 w/ provider Liz Shine. Routing to provider Liz Shine NP for review and completion.

## 2018-06-21 DIAGNOSIS — F33.1 MODERATE EPISODE OF RECURRENT MAJOR DEPRESSIVE DISORDER (HCC): ICD-10-CM

## 2018-06-21 RX ORDER — PAROXETINE HYDROCHLORIDE 40 MG/1
TABLET, FILM COATED ORAL
Qty: 90 TAB | Refills: 0 | Status: SHIPPED | OUTPATIENT
Start: 2018-06-21 | End: 2018-07-23 | Stop reason: SDUPTHER

## 2018-07-23 ENCOUNTER — OFFICE VISIT (OUTPATIENT)
Dept: FAMILY MEDICINE CLINIC | Age: 68
End: 2018-07-23

## 2018-07-23 VITALS
TEMPERATURE: 98.3 F | HEART RATE: 86 BPM | BODY MASS INDEX: 26.46 KG/M2 | DIASTOLIC BLOOD PRESSURE: 77 MMHG | HEIGHT: 64 IN | WEIGHT: 155 LBS | SYSTOLIC BLOOD PRESSURE: 109 MMHG

## 2018-07-23 DIAGNOSIS — L82.0 SEBORRHEIC KERATOSES, INFLAMED: ICD-10-CM

## 2018-07-23 DIAGNOSIS — I10 ESSENTIAL HYPERTENSION WITH GOAL BLOOD PRESSURE LESS THAN 140/90: ICD-10-CM

## 2018-07-23 DIAGNOSIS — F33.1 MODERATE EPISODE OF RECURRENT MAJOR DEPRESSIVE DISORDER (HCC): ICD-10-CM

## 2018-07-23 DIAGNOSIS — M17.0 PRIMARY OSTEOARTHRITIS OF BOTH KNEES: ICD-10-CM

## 2018-07-23 DIAGNOSIS — M54.2 NECK PAIN: ICD-10-CM

## 2018-07-23 DIAGNOSIS — B07.0 PLANTAR WART OF RIGHT FOOT: ICD-10-CM

## 2018-07-23 DIAGNOSIS — M79.10 MUSCLE PAIN: ICD-10-CM

## 2018-07-23 DIAGNOSIS — E11.9 CONTROLLED TYPE 2 DIABETES MELLITUS WITHOUT COMPLICATION, WITHOUT LONG-TERM CURRENT USE OF INSULIN (HCC): Primary | ICD-10-CM

## 2018-07-23 LAB — GLUCOSE POC: 122 MG/DL

## 2018-07-23 RX ORDER — LISINOPRIL AND HYDROCHLOROTHIAZIDE 10; 12.5 MG/1; MG/1
TABLET ORAL
Qty: 90 TAB | Refills: 1 | Status: SHIPPED | OUTPATIENT
Start: 2018-07-23 | End: 2019-06-14 | Stop reason: SDUPTHER

## 2018-07-23 RX ORDER — IBUPROFEN 600 MG/1
TABLET ORAL
Qty: 60 TAB | Refills: 1 | Status: SHIPPED | OUTPATIENT
Start: 2018-07-23 | End: 2021-08-20 | Stop reason: ALTCHOICE

## 2018-07-23 RX ORDER — PAROXETINE HYDROCHLORIDE 40 MG/1
TABLET, FILM COATED ORAL
Qty: 90 TAB | Refills: 1 | Status: SHIPPED | OUTPATIENT
Start: 2018-07-23 | End: 2022-05-12 | Stop reason: SDUPTHER

## 2018-07-23 RX ORDER — CYCLOBENZAPRINE HCL 10 MG
TABLET ORAL
Qty: 30 TAB | Refills: 1 | Status: SHIPPED | OUTPATIENT
Start: 2018-07-23 | End: 2018-11-28 | Stop reason: SDUPTHER

## 2018-07-23 NOTE — PROGRESS NOTES
Lord Heimlich is a 75 y.o. female    Issues discussed today include:    Chief Complaint   Patient presents with   [facility] Referral Request     pt will like a referral to knee specialist, pt seen by Dr Ellen Gonzalez at [address] Warts     on bottom of feet       1) Knee OA:  Has been seen here for his previously, also seen by Ortho. She requests referral to go back to see Dr. Bard Castillo. Says pain is getting worse, limiting her daily activities. Is generally an active person, but can tell when she overdoes. Taking motrin 600mg prn for pain, not daily. 2) Warts and skin lesion:  Shows me 2 warts on bottom of her R foot. Also c/o irritation of a skin lesion on middle of her back, catching on her undergarments. Not sure if that one is changing, no bleeding. Left forearm with a spot that blistered up last week, \"like a burn\" but she doesn't recall getting burned or injuring it. Has since deflated, but now purple in color with some rough spots.         Data reviewed or ordered today:       Other problems include:  Patient Active Problem List   Diagnosis Code    Depression F32.9    Diabetes mellitus type 2, controlled ([facility] [address] E11.9    Plantar wart of right foot B07.0    Hepatitis C, chronic (HCC) B18.2    Onychomycosis B35.1    Primary osteoarthritis of both knees M17.0    Pure hypercholesterolemia E78.00    Osteoporosis M81.0    Essential hypertension with goal blood pressure less than 140/90 I10       Medications:  Current Outpatient Prescriptions   Medication Sig Dispense Refill    ibuprofen (MOTRIN) 600 mg tablet TAKE 1 TABLET BY MOUTH EVERY 8 HOURS AS NEEDED FOR PAIN 60 Tab 1    lisinopril-hydroCHLOROthiazide (PRINZIDE, ZESTORETIC) 10-12.5 mg per tablet TAKE 1 TABLET BY MOUTH EVERY DAY 90 Tab 1    PARoxetine (PAXIL) 40 mg tablet TAKE 1 TABLET BY MOUTH EVERY DAY 90 Tab 1    cyclobenzaprine (FLEXERIL) 10 mg tablet TAKE 1 TABLET BY MOUTH 3 TIMES A DAY AS NEEDED FOR MUSCLE SPASMS 30 Tab 1    nicotine (NICODERM CQ) 7 mg/24 hr APPLY 1 PATCH TO SKIN EVERY DAY FOR 2 TO 4 WEEKS 28 Patch 0    Blood-Glucose Meter (TRUE METRIX AIR GLUCOSE METER) misc Use as directed. 1 Each 0    glucose blood VI test strips (TRUE METRIX GLUCOSE TEST STRIP) strip Use to check glucose twice a day. 100 Strip 5    lancets (TRUEPLUS LANCETS) 28 gauge misc Use to check glucose twice a day. 100 Units 5    Blood Glucose Control, Low (TRUE METRIX LEVEL 1) soln Use as directed. 1 Each 2    alcohol swabs (BD SINGLE USE SWABS REGULAR) padm Use as directed. 100 Pad 5    Arm Brace (NEOPRENE WRIST SPLINT SUPPORT) misc Apply to right wrist. 1 Each 0       Allergies: Allergies   Allergen Reactions    Amoxicillin Nausea and Vomiting    Penicillin G Nausea and Vomiting     Pt can't remember that reaction it caused. LMP:  No LMP recorded. Patient is postmenopausal.    Social History     Social History    Marital status: LEGALLY      Spouse name: N/A    Number of children: N/A    Years of education: N/A     Occupational History    Not on file.      Social History Main Topics    Smoking status: Current Every Day Smoker     Packs/day: 0.50     Types: Cigarettes    Smokeless tobacco: Never Used    Alcohol use No    Drug use: No    Sexual activity: Not on file     Other Topics Concern    Not on file     Social History Narrative       Family History   Problem Relation Age of Onset   [facility] Cancer Mother       at 59 lung cancer    Cancer Brother      half brother    Other Maternal Grandmother      GI hemorrhage    Other Father      Pt does not know her father    Cancer Brother      half brother         Physical Exam   Visit Vitals    /77    Pulse 86    Temp 98.3 °F (36.8 °C) (Oral)    Ht 5' 4\" (1.626 m)    Wt 155 lb (70.3 kg)    BMI 26.61 kg/m2      BP Readings from Last 3 Encounters:   18 109/77   17 113/80   17 166/88     Constitutional: Appears well,  No acute distress, Vitals noted  Psychiatric:  Affect normal, Alert and Oriented to person/place/time  Eyes:  Conjunctiva clear, no drainage  ENT:  External ears and nose normal, Mucous membranes moist  Neck:  General inspection normal. Supple. Heart:  Normal HR, Normal S1 and S2,  Regular rhythm. No murmurs, rubs or gallops. Lungs:  Clear to auscultation, good respiratory effort, no wheezes, rales or rhonchi  Extremities: Without edema, good peripheral pulses, varicose veins overlying R anterior knee  Skin:  Warm to palpation,   1) 2-3 mm plantar warts  2) one rough, pale 7mm plaque on midline thoracic back  2) one 6mm purple/ecchymotic nodule on L forearm with scal at proximal edge, nontender      Lab Results   Component Value Date/Time    Glucose 96 11/01/2017 12:15 PM    Glucose  07/23/2018 02:22 PM         Assessment/Plan:      ICD-10-CM ICD-9-CM    1. Controlled type 2 diabetes mellitus without complication, without long-term current use of insulin (HCC) E11.9 250.00 AMB POC GLUCOSE, QUANTITATIVE, BLOOD   2. Neck pain M54.2 723.1 ibuprofen (MOTRIN) 600 mg tablet   3. Primary osteoarthritis of both knees M17.0 715.16 REFERRAL TO ORTHOPEDIC SURGERY      ibuprofen (MOTRIN) 600 mg tablet   4. Essential hypertension with goal blood pressure less than 140/90 I10 401.9 lisinopril-hydroCHLOROthiazide (PRINZIDE, ZESTORETIC) 10-12.5 mg per tablet   5. Moderate episode of recurrent major depressive disorder (HCC) F33.1 296.32 PARoxetine (PAXIL) 40 mg tablet   6. Muscle pain M79.1 729.1 cyclobenzaprine (FLEXERIL) 10 mg tablet   7. Plantar wart of right foot B07.0 078.12    8.  Seborrheic keratoses, inflamed L82.0 702.11        Refill to ortho sent, pt [facility] Veena Smaller)  Refills on motrin given (advised sparing use)   Cryotherapy of back inflamed SK and two small plantar warts on lateral plantar R foot  Will observe acute L forearm lesion for now, consider tx if not resolving  Refills on chronic meds as above        Follow-up Disposition:  Return for 4 weeks for well woman exam.        Damon Rogers MD  [address] [place] [address] [place]

## 2018-07-23 NOTE — PROGRESS NOTES
Chief Complaint   Patient presents with   [facility] Referral Request     pt will like a referral to knee specialist    Warts     on bottom of feet     Coordination of Care  1. Have you been to the ER, urgent care clinic since your last visit? Hospitalized since your last visit? No    2. Have you seen or consulted any other health care providers outside of the [address] [place] [address] since your last visit? Include any pap smears or colon screening. No    Does the patient need refills? YES    Learning Assessment Complete?  yes

## 2018-07-23 NOTE — MR AVS SNAPSHOT
[address] [facility] [address] 
 
 
 [address] [facility] [address] [address] 
[phone] Patient: Jackie Barton MRN: [id number] [id number] Visit Information Date & Time Provider Department Dept. Phone Encounter #  
 7/23/2018  1:55 PM Kina Darden, [address] [place] [address] [phone] [id number] Follow-up Instructions Return for 4 weeks for well woman exam.  
  
Your Appointments 8/20/2018  1:45 PM  
ROUTINE CARE with [address] [place] [address] [address] [place] [address] [place] ([address]) Appt Note: ss   letter 651 [address]  
  
   
 [facility] Upcoming Health Maintenance Date Due DTaP/Tdap/Td series (1 - Tdap) 12/25/1970 FOBT Q 1 YEAR AGE 50-75 12/25/1999 ZOSTER VACCINE AGE 60> 10/25/2009 EYE EXAM RETINAL OR DILATED Q1 1/1/2013 GLAUCOMA SCREENING Q2Y 12/25/2014 FOOT EXAM Q1 2/9/2016 MICROALBUMIN Q1 5/16/2017 LIPID PANEL Q1 5/16/2017 HEMOGLOBIN A1C Q6M 7/25/2017 Pneumococcal 65+ Low/Medium Risk (2 of 2 - PCV13) 1/25/2018 MEDICARE YEARLY EXAM 3/14/2018 BREAST CANCER SCRN MAMMOGRAM 6/22/2018 Influenza Age 5 to Adult 8/1/2018 Allergies as of 7/23/2018  Review Complete On: 7/23/2018 By: Kina Darden MD  
  
 Severity Noted Reaction Type Reactions Amoxicillin  04/17/2013    Nausea and Vomiting Penicillin G  04/17/2013    Nausea and Vomiting Pt can't remember that reaction it caused. Current Immunizations  Reviewed on 1/25/2017 Name Date Influenza Vaccine (Quad) PF 11/1/2017, 1/25/2017 Pneumococcal Polysaccharide (PPSV-23) 1/25/2017, 2/9/2015 Not reviewed this visit You Were Diagnosed With   
  
 Codes Comments Controlled type 2 diabetes mellitus without complication, without long-term current use of insulin ([facility]    -  Primary ICD-10-CM: E11.9 ICD-9-CM: 250.00 Neck pain     ICD-10-CM: M54.2 ICD-9-CM: 723.1 Primary osteoarthritis of both knees     ICD-10-CM: M17.0 ICD-9-CM: 715.16 Essential hypertension with goal blood pressure less than 140/90     ICD-10-CM: I10 
ICD-9-CM: 401.9 Moderate episode of recurrent major depressive disorder (HCC)     ICD-10-CM: F33.1 ICD-9-CM: 296.32 Muscle pain     ICD-10-CM: M79.1 ICD-9-CM: 729.1 Plantar wart of right foot     ICD-10-CM: B07.0 ICD-9-CM: 078.12 Seborrheic keratoses, inflamed     ICD-10-CM: L82.0 ICD-9-CM: 702.11 Vitals BP Pulse Temp Height(growth percentile) Weight(growth percentile) BMI  
 109/77 86 98.3 °F (36.8 °C) (Oral) 5' 4\" (1.626 m) 155 lb (70.3 kg) 26.61 kg/m2 OB Status Smoking Status Postmenopausal Current Every Day Smoker Vitals History BMI and BSA Data Body Mass Index Body Surface Area  
 26.61 kg/m 2 1.78 m 2 Preferred Pharmacy Pharmacy Name Phone [id number] [address] [place] [address] [phone] Your Updated Medication List  
  
   
This list is accurate as of 7/23/18  3:16 PM.  Always use your most recent med list.  
  
  
  
  
 alcohol swabs Padm Commonly known as:  BD Single Use Swabs Regular Use as directed. Arm Brace Misc Commonly known as:  NEOPRENE WRIST SPLINT SUPPORT Apply to right wrist.  
  
 Blood Glucose Control, Low Soln Commonly known as:  TRUE METRIX LEVEL 1 Use as directed. Blood-Glucose Meter Misc Commonly known as:  TRUE METRIX AIR GLUCOSE METER Use as directed. cyclobenzaprine 10 mg tablet Commonly known as:  FLEXERIL  
TAKE 1 TABLET BY MOUTH 3 TIMES A DAY AS NEEDED FOR MUSCLE SPASMS  
  
 glucose blood VI test strips strip Commonly known as:  TRUE METRIX GLUCOSE TEST STRIP Use to check glucose twice a day. ibuprofen 600 mg tablet Commonly known as:  MOTRIN  
 TAKE 1 TABLET BY MOUTH EVERY 8 HOURS AS NEEDED FOR PAIN  
  
 lancets 28 gauge Misc Commonly known as:  Roxene Fermo Use to check glucose twice a day. lisinopril-hydroCHLOROthiazide 10-12.5 mg per tablet Commonly known as:  PRINZIDE, ZESTORETIC  
TAKE 1 TABLET BY MOUTH EVERY DAY  
  
 nicotine 7 mg/24 hr  
Commonly known as:  NICODERM CQ  
APPLY 1 PATCH TO SKIN EVERY DAY FOR 2 TO 4 WEEKS PARoxetine 40 mg tablet Commonly known as:  PAXIL TAKE 1 TABLET BY MOUTH EVERY DAY Prescriptions Sent to Pharmacy Refills  
 ibuprofen (MOTRIN) 600 mg tablet 1 Sig: TAKE 1 TABLET BY MOUTH EVERY 8 HOURS AS NEEDED FOR PAIN Class: Normal  
 Pharmacy: [facility]/pharmacy #[id number]- [address] [facility] Ph #: [phone]  
 lisinopril-hydroCHLOROthiazide (PRINZIDE, ZESTORETIC) 10-12.5 mg per tablet 1 Sig: TAKE 1 TABLET BY MOUTH EVERY DAY Class: Normal  
 Pharmacy: [facility]/pharmacy #[id number]- [address] [facility], [address] ([place] [address]) Ph #: [phone] PARoxetine (PAXIL) 40 mg tablet 1 Sig: TAKE 1 TABLET BY MOUTH EVERY DAY Class: Normal  
 Pharmacy: [facility]/pharmacy #[id number]- [address] [facility] Ph #: [phone]  
 cyclobenzaprine (FLEXERIL) 10 mg tablet 1 Sig: TAKE 1 TABLET BY MOUTH 3 TIMES A DAY AS NEEDED FOR MUSCLE SPASMS Class: Normal  
 Pharmacy: [facility]/pharmacy #[id number]- [address] [facility], [address] [facility] [address] ([place] [address]) Ph #: [phone] We Performed the Following AMB POC GLUCOSE, QUANTITATIVE, BLOOD [12342 CPT(R)] REFERRAL TO ORTHOPEDIC SURGERY [REF62 Custom] Comments:  
 Bilateral knee pain, known OA and medial tibial plateau subchondral fracture of left knee (per prior MRI) Follow-up Instructions Return for 4 weeks for well woman exam.  
  
  
Referral Information Referral ID Referred By Referred To  
  
 [id number] Patricia GAN Not Available Visits Status Start Date End Date 1 New Request 7/23/18 7/23/19 If your referral has a status of pending review or denied, additional information will be sent to support the outcome of this decision. Introducing [facility]! [employer] introduces MedRunner patient portal. Now you can access parts of your medical record, email your doctor's office, and request medication refills online. 1. In your internet browser, go to [email or web address] 2. Click on the First Time User? Click Here link in the Sign In box. You will see the New Member Sign Up page. 3. Enter your MedRunner Access Code exactly as it appears below. You will not need to use this code after youve completed the sign-up process. If you do not sign up before the expiration date, you must request a new code. · MedRunner Access Code: [email or web address] Expires: 10/21/2018  3:14 PM 
 
4. Enter the last four digits of your Social Security Number (xxxx) and Date of Birth (mm/dd/yyyy) as indicated and click Submit. You will be taken to the next sign-up page. 5. Create a MedRunner ID. This will be your MedRunner login ID and cannot be changed, so think of one that is secure and easy to remember. 6. Create a MedRunner password. You can change your password at any time. 7. Enter your Password Reset Question and Answer. This can be used at a later time if you forget your password. 8. Enter your e-mail address. You will receive e-mail notification when new information is available in [address] 9. Click Sign Up. You can now view and download portions of your medical record. 10. Click the Download Summary menu link to download a portable copy of your medical information. If you have questions, please visit the Frequently Asked Questions section of the MedRunner website.  Remember, MedRunner is NOT to be used for urgent needs. For medical emergencies, dial 911. Now available from your iPhone and Android! Please provide this summary of care documentation to your next provider. Your primary care clinician is listed as Jasmin Webber. If you have any questions after today's visit, please call [phone].

## 2018-09-10 ENCOUNTER — TELEPHONE (OUTPATIENT)
Dept: FAMILY MEDICINE CLINIC | Age: 68
End: 2018-09-10

## 2018-09-10 NOTE — TELEPHONE ENCOUNTER
I am having trouble seeing where metformin was d/c'd 7/10/18  I don't think this pt's DM has been addressed in a long time, last A1c from 1/2017 was 6.0%  I agree no current or recent rx for blood glucose medication - ?is she seeing another doctor at outside office  Perhaps pt could come in for HTN, DM appt and we can check labs, update med list

## 2018-09-10 NOTE — TELEPHONE ENCOUNTER
[id number] [address] Trinidad, thanks for sending this.  If pt is still taking the med (bc last rx was from 6/2017 with only 6 mo worth of medicine), then I am happy to refill prior to the appt to f/u for DM, HTN

## 2018-09-10 NOTE — TELEPHONE ENCOUNTER
metFORMIN ER (GLUCOPHAGE XR) 500 mg tablet [[id number]] DISCONTINUED   Order Details   Dose, Route, Frequency: As Directed    Dispense Quantity:  30 Tab Refills:  5 Fills Remaining:  --           Sig: TAKE 1 TABLET BY MOUTH EVERY DAY WITH DINNER          Discontinue Date:  7/10/2018 3561 Discontinue User:  Flavio Rodney MD Discontinue Reason:  Therapy Completed   Written Date:  06/05/17 Expiration Date:  --     Start Date:  06/05/17 End Date:  07/10/18            Ordering Provider:  -- FRAN #:  -- NPI:  --    Authorizing Provider:  Carmelina Guido MD FRAN #:  [id number] NPI:  [id number]

## 2018-09-10 NOTE — TELEPHONE ENCOUNTER
Message left on the [employer] by [employer], [phone], asking for a return call about the patient. Return call made to Sergio Jack who stated that the patient is confused about her diabetes management and needs a refill of Metformin. Sergio Jack also stated that the patient does not have a glucometer and is not checking her blood sugar level. Per chart review, Metformin was discontinued on 07-10-18 and the patient does not have any current prescriptions for blood sugar medicine. Catherene Najjar requests a return call so that she can then call the patient. Routing to the provider for review.  Fransisco Briceno RN

## 2018-09-11 RX ORDER — METFORMIN HYDROCHLORIDE 500 MG/1
500 TABLET, EXTENDED RELEASE ORAL
Qty: 30 TAB | Refills: 1 | Status: SHIPPED | OUTPATIENT
Start: 2018-09-11 | End: 2018-09-27 | Stop reason: SDUPTHER

## 2018-09-11 NOTE — TELEPHONE ENCOUNTER
Rx for metformin XR 500mg to be taken daily sent to [facility]'s [facility] for 30 tabs with 1 refill - expecting to see pt soon for DM f/u

## 2018-09-11 NOTE — TELEPHONE ENCOUNTER
Called pt, she says she was on the Metformin but has been out since her last ov. She has appt for f/s after which she will make a provider appt. Pt also requested a new meter, the one that goes on your arm which means you do not have to stick your finger. Advised her that we have not had much success getting approval for this meter \"Contour\" because of the high cost of the meter and inserts. Pt will check with Che with her insurance company to see if she can get meter.   She does not want us to order anything until she finds out if she can get approval for Contour meter

## 2018-09-25 NOTE — TELEPHONE ENCOUNTER
Patient calling today stating that she received a call from us today. Chart reviewed and nurse does not note any tel. Encounter for today. [facility] staff asked if they have tried to reach patient today and no one tried to reach patient, does have an upcoming appointment on 9/27/18 and it may have been a remainder call from [facility]. Patient does tell me that she went to the pharmacy to  her Metfomin prescription.

## 2018-09-27 ENCOUNTER — OFFICE VISIT (OUTPATIENT)
Dept: FAMILY MEDICINE CLINIC | Age: 68
End: 2018-09-27

## 2018-09-27 VITALS
TEMPERATURE: 98.2 F | HEART RATE: 79 BPM | WEIGHT: 157 LBS | SYSTOLIC BLOOD PRESSURE: 144 MMHG | DIASTOLIC BLOOD PRESSURE: 87 MMHG | BODY MASS INDEX: 26.95 KG/M2

## 2018-09-27 DIAGNOSIS — L57.0 ACTINIC KERATOSIS: ICD-10-CM

## 2018-09-27 DIAGNOSIS — E11.9 CONTROLLED TYPE 2 DIABETES MELLITUS WITHOUT COMPLICATION, WITHOUT LONG-TERM CURRENT USE OF INSULIN (HCC): ICD-10-CM

## 2018-09-27 DIAGNOSIS — H91.93 BILATERAL HEARING LOSS, UNSPECIFIED HEARING LOSS TYPE: ICD-10-CM

## 2018-09-27 DIAGNOSIS — H26.9 CATARACT OF BOTH EYES, UNSPECIFIED CATARACT TYPE: ICD-10-CM

## 2018-09-27 DIAGNOSIS — J98.9 RESPIRATORY ILLNESS: Primary | ICD-10-CM

## 2018-09-27 RX ORDER — AZITHROMYCIN 250 MG/1
TABLET, FILM COATED ORAL
Qty: 6 TAB | Refills: 0 | Status: SHIPPED | OUTPATIENT
Start: 2018-09-27 | End: 2018-10-02

## 2018-09-27 RX ORDER — METFORMIN HYDROCHLORIDE 500 MG/1
500 TABLET, EXTENDED RELEASE ORAL
Qty: 90 TAB | Refills: 3 | Status: ON HOLD | OUTPATIENT
Start: 2018-09-27 | End: 2021-09-01

## 2018-09-27 NOTE — PROGRESS NOTES
Coordination of Care  1. Have you been to the ER, urgent care clinic since your last visit? Hospitalized since your last visit? No    2. Have you seen or consulted any other health care providers outside of the [address] [place] [address] since your last visit? Include any pap smears or colon screening. No    Does the patient need refills? NO    Learning Assessment Complete?  yes

## 2018-09-27 NOTE — PROGRESS NOTES
Jaquelin Hernandez is a 75 y.o. female    Issues discussed today include:    Chief Complaint   Patient presents with    Cold Symptoms     Cold symptoms    Mole     Mole removal       1) Nasal congestion, HA:  Started 5 days ago with stuffy nose at night and then woke up the next am with cough, sore throat, headache. Is Is coughing up yellow phelgm. Now c/o chest congestion. Took friends inhaler which helped. No known h/o asthma or copd for the pt. Has also tried mucinex, zyrtec without relief. Smokes 1/2 ppd. Is cutting back. She has been able to go a whole week without a cigarette before, but not for much longer. Uses tobacco for stress relief. Her roommates smoke too, \"but none of us smoke inside the house. \" Says she had always smoked pot for her anxiety and stomach issues, but then got in trouble for it and hasn't smoked in 3 yrs. Now cannot use it bc \"I have to do urine drug screens. \"    Data reviewed or ordered today:       Other problems include:  Patient Active Problem List   Diagnosis Code    Depression F32.9    Diabetes mellitus type 2, controlled ([facility] E11.9    Plantar wart of right foot B07.0    Hepatitis C, chronic (HCC) B18.2    Onychomycosis B35.1    Primary osteoarthritis of both knees M17.0    Pure hypercholesterolemia E78.00    Osteoporosis M81.0    Essential hypertension with goal blood pressure less than 140/90 I10       Medications:  Current Outpatient Prescriptions   Medication Sig Dispense Refill    azithromycin (ZITHROMAX) 250 mg tablet Take 2 tablets today, then take 1 tablet daily 6 Tab 0    metFORMIN ER (GLUCOPHAGE XR) 500 mg tablet Take 1 Tab by mouth daily (with dinner).  90 Tab 3    ibuprofen (MOTRIN) 600 mg tablet TAKE 1 TABLET BY MOUTH EVERY 8 HOURS AS NEEDED FOR PAIN 60 Tab 1    lisinopril-hydroCHLOROthiazide (PRINZIDE, ZESTORETIC) 10-12.5 mg per tablet TAKE 1 TABLET BY MOUTH EVERY DAY 90 Tab 1    PARoxetine (PAXIL) 40 mg tablet TAKE 1 TABLET BY MOUTH EVERY DAY 90 Tab 1  cyclobenzaprine (FLEXERIL) 10 mg tablet TAKE 1 TABLET BY MOUTH 3 TIMES A DAY AS NEEDED FOR MUSCLE SPASMS 30 Tab 1    nicotine (NICODERM CQ) 7 mg/24 hr APPLY 1 PATCH TO SKIN EVERY DAY FOR 2 TO 4 WEEKS 28 Patch 0    Blood-Glucose Meter (TRUE METRIX AIR GLUCOSE METER) misc Use as directed. 1 Each 0    glucose blood VI test strips (TRUE METRIX GLUCOSE TEST STRIP) strip Use to check glucose twice a day. 100 Strip 5    lancets (TRUEPLUS LANCETS) 28 gauge misc Use to check glucose twice a day. 100 Units 5    Blood Glucose Control, Low (TRUE METRIX LEVEL 1) soln Use as directed. 1 Each 2    alcohol swabs (BD SINGLE USE SWABS REGULAR) padm Use as directed. 100 Pad 5    Arm Brace (NEOPRENE WRIST SPLINT SUPPORT) misc Apply to right wrist. 1 Each 0       Allergies: Allergies   Allergen Reactions    Amoxicillin Nausea and Vomiting    Penicillin G Nausea and Vomiting     Pt can't remember that reaction it caused. LMP:  No LMP recorded. Patient is postmenopausal.    Social History     Social History    Marital status: LEGALLY      Spouse name: N/A    Number of children: N/A    Years of education: N/A     Occupational History    Not on file.      Social History Main Topics    Smoking status: Current Every Day Smoker     Packs/day: 0.50     Types: Cigarettes    Smokeless tobacco: Never Used    Alcohol use No    Drug use: No    Sexual activity: Not on file     Other Topics Concern    Not on file     Social History Narrative       Family History   Problem Relation Age of Onset   [address] [facility] Cancer Mother       at 59 lung cancer    Cancer Brother      half brother    Other Maternal Grandmother      GI hemorrhage    Other Father      Pt does not know her father    Cancer Brother      half brother         Physical Exam   Visit Vitals    /87 (BP 1 Location: Left arm, BP Patient Position: Sitting)    Pulse 79    Temp 98.2 °F (36.8 °C) (Oral)    Wt 157 lb (71.2 kg)    BMI 26.95 kg/m2      BP Readings from Last 3 Encounters:   09/27/18 144/87   07/23/18 109/77   12/19/17 113/80     Constitutional: Appears well,  No acute distress, Vitals noted  Psychiatric:  Affect normal, Alert and Oriented to person/place/time  Eyes:  Conjunctiva clear, no drainage, lens cloudy bilaterally R>L  ENT:  External ears and nose normal, Mucous membranes moist. TMs grey and non-bulging bilaterally. R EAC with dry cerumen, not obscuring TM or impacted. OP without erythema, edema or exudate. Bilateral nares with mild erythema and scant clear drainage, no edema or polyps. Neck:  General inspection normal. Supple. No lymphadenopathy  Heart:  Normal HR, Normal S1 and S2,  Regular rhythm. No murmurs, rubs or gallops. Lungs:  Clear to auscultation, good respiratory effort, no wheezes, rales or rhonchi  Extremities: Without edema, good peripheral pulses  Skin:  Warm to palpation, scaly mildly erythematous papule on midline upper chest wall       Assessment/Plan:      ICD-10-CM ICD-9-CM    1. Respiratory illness J98.9 519.9 azithromycin (ZITHROMAX) 250 mg tablet   2. Bilateral hearing loss, unspecified hearing loss type H91.93 389.9 REFERRAL TO ENT-OTOLARYNGOLOGY   3. Actinic keratosis L57.0 702.0 REFERRAL TO DERMATOLOGY   4. Cataract of both eyes, unspecified cataract type H26.9 366.9 REFERRAL TO OPHTHALMOLOGY   5. Controlled type 2 diabetes mellitus without complication, without long-term current use of insulin (HCC) E11.9 250.00 metFORMIN ER (GLUCOPHAGE XR) 500 mg tablet      LIPID PANEL      METABOLIC PANEL, BASIC      CBC W/O DIFF      HEMOGLOBIN A1C WITH EAG       75 y.o. female here for URI sxs, some chest congestion and long time h/o tobacco use  Will given zpack to be filled in 2-43 days (over the weekend) if worsening cough, fever, fatigue, chest congestion.    Instructed pt to go to ER if having dyspnea, chest pain, intractable fever, etc.  Requests referral to derm and she does have suspected AK on chest wall, plus reportedly many new moles. Derm referral placed and form given to pt. Also submitted referrals to ENT and Ophtho for which pt already has appts  Will come back for fasting labs      Follow-up Disposition:  Return in about 3 months (around 12/27/2018).         Niurka Wade MD  [address] [place] [address] Sena Hudson

## 2018-09-27 NOTE — MR AVS SNAPSHOT
[address] [place] [address] [address] [id number] [phone] Patient: Fred Delaney MRN: [id number] XTL:[id number] Visit Information Date & Time Provider Department Dept. Phone Encounter #  
 9/27/2018  9:10 AM Merlinda Kelp, [facility] [phone] [id number] Follow-up Instructions Return in about 3 months (around 12/27/2018). Upcoming Health Maintenance Date Due DTaP/Tdap/Td series (1 - Tdap) 12/25/1970 Shingrix Vaccine Age 50> (1 of 2) 12/25/1999 FOBT Q 1 YEAR AGE 50-75 12/25/1999 EYE EXAM RETINAL OR DILATED Q1 1/1/2013 GLAUCOMA SCREENING Q2Y 12/25/2014 FOOT EXAM Q1 2/9/2016 MICROALBUMIN Q1 5/16/2017 LIPID PANEL Q1 5/16/2017 HEMOGLOBIN A1C Q6M 7/25/2017 Pneumococcal 65+ Low/Medium Risk (2 of 2 - PCV13) 1/25/2018 BREAST CANCER SCRN MAMMOGRAM 6/22/2018 Influenza Age 5 to Adult 8/1/2018 Allergies as of 9/27/2018  Review Complete On: 9/27/2018 By: Claudia Hester Severity Noted Reaction Type Reactions Amoxicillin  04/17/2013    Nausea and Vomiting Penicillin G  04/17/2013    Nausea and Vomiting Pt can't remember that reaction it caused. Current Immunizations  Reviewed on 1/25/2017 Name Date Influenza Vaccine (Quad) PF 11/1/2017, 1/25/2017 Pneumococcal Polysaccharide (PPSV-23) 1/25/2017, 2/9/2015 Not reviewed this visit You Were Diagnosed With   
  
 Codes Comments Respiratory illness    -  Primary ICD-10-CM: J98.9 ICD-9-CM: 519.9 Bilateral hearing loss, unspecified hearing loss type     ICD-10-CM: H91.93 
ICD-9-CM: 389. 9 Actinic keratosis     ICD-10-CM: L57.0 ICD-9-CM: 702.0 Cataract of both eyes, unspecified cataract type     ICD-10-CM: H26.9 ICD-9-CM: 366.9 Controlled type 2 diabetes mellitus without complication, without long-term current use of insulin ([address]     ICD-10-CM: E11.9 ICD-9-CM: 250.00 Vitals BP Pulse Temp Weight(growth percentile) BMI OB Status 144/87 (BP 1 Location: Left arm, BP Patient Position: Sitting) 79 98.2 °F (36.8 °C) (Oral) 157 lb (71.2 kg) 26.95 kg/m2 Postmenopausal  
 Smoking Status Current Every Day Smoker Vitals History BMI and BSA Data Body Mass Index Body Surface Area  
 26.95 kg/m 2 1.79 m 2 Preferred Pharmacy Pharmacy Name Phone [facility]/PHARMACY #[id number]- [place], [address] ([place] [address]) [phone] Your Updated Medication List  
  
   
This list is accurate as of 9/27/18 10:20 AM.  Always use your most recent med list.  
  
  
  
  
 alcohol swabs Padm Commonly known as:  BD Single Use Swabs Regular Use as directed. Arm Brace Misc Commonly known as:  NEOPRENE WRIST SPLINT SUPPORT Apply to right wrist.  
  
 azithromycin 250 mg tablet Commonly known as:  Rosanna Isis Take 2 tablets today, then take 1 tablet daily Blood Glucose Control, Low Soln Commonly known as:  TRUE METRIX LEVEL 1 Use as directed. Blood-Glucose Meter Misc Commonly known as:  TRUE METRIX AIR GLUCOSE METER Use as directed. cyclobenzaprine 10 mg tablet Commonly known as:  FLEXERIL  
TAKE 1 TABLET BY MOUTH 3 TIMES A DAY AS NEEDED FOR MUSCLE SPASMS  
  
 glucose blood VI test strips strip Commonly known as:  TRUE METRIX GLUCOSE TEST STRIP Use to check glucose twice a day. ibuprofen 600 mg tablet Commonly known as:  MOTRIN  
TAKE 1 TABLET BY MOUTH EVERY 8 HOURS AS NEEDED FOR PAIN  
  
 lancets 28 gauge Misc Commonly known as:  Jos Jose Use to check glucose twice a day. lisinopril-hydroCHLOROthiazide 10-12.5 mg per tablet Commonly known as:  PRINZIDE, ZESTORETIC  
TAKE 1 TABLET BY MOUTH EVERY DAY  
  
 metFORMIN  mg tablet Commonly known as:  GLUCOPHAGE XR Take 1 Tab by mouth daily (with dinner).   
  
 nicotine 7 mg/24 hr  
 Commonly known as:  NICODERM CQ  
APPLY 1 PATCH TO SKIN EVERY DAY FOR 2 TO 4 WEEKS PARoxetine 40 mg tablet Commonly known as:  PAXIL TAKE 1 TABLET BY MOUTH EVERY DAY Prescriptions Sent to Pharmacy Refills  
 azithromycin (ZITHROMAX) 250 mg tablet 0 Sig: Take 2 tablets today, then take 1 tablet daily Class: Normal  
 [place] [facility] [place]: [phone]  
 metFORMIN ER (GLUCOPHAGE XR) 500 mg tablet 3 Sig: Take 1 Tab by mouth daily (with dinner). Class: Normal  
 Pharmacy: [facility]/pharmacy #[id number]- [address], [address] ([place] [address]) Ph #: [phone] Route: Oral  
  
We Performed the Following REFERRAL TO DERMATOLOGY [REF19 Custom] Comments:  
 Fair skinned, multiple new moles and AK on chest wall REFERRAL TO ENT-OTOLARYNGOLOGY [LEQ67 Custom] Comments:  
 Hearing loss REFERRAL TO OPHTHALMOLOGY [REF57 Custom] Comments:  
 Bilateral cataracts Follow-up Instructions Return in about 3 months (around 12/27/2018). Referral Information Referral ID Referred By Referred To  
  
 [id number] [facility] A Not Available Visits Status Start Date End Date 1 New Request 9/27/18 9/27/19 If your referral has a status of pending review or denied, additional information will be sent to support the outcome of this decision. Referral ID Referred By Referred To  
 [id number] [facility] A Not Available Visits Status Start Date End Date 1 New Request 9/27/18 9/27/19 If your referral has a status of pending review or denied, additional information will be sent to support the outcome of this decision. Referral ID Referred By Referred To  
 [id number] [facility] Not Available Visits Status Start Date End Date 1 New Request 9/27/18 9/27/19 If your referral has a status of pending review or denied, additional information will be sent to support the outcome of this decision. Introducing [facility]! Yareli Miller introduces Biomeme patient portal. Now you can access parts of your medical record, email your doctor's office, and request medication refills online. 1. In your internet browser, go to [email or web address] 2. Click on the First Time User? Click Here link in the Sign In box. You will see the New Member Sign Up page. 3. Enter your Biomeme Access Code exactly as it appears below. You will not need to use this code after youve completed the sign-up process. If you do not sign up before the expiration date, you must request a new code. · Biomeme Access Code: [email or web address] Expires: 10/21/2018  3:14 PM 
 
4. Enter the last four digits of your Social Security Number (xxxx) and Date of Birth (mm/dd/yyyy) as indicated and click Submit. You will be taken to the next sign-up page. 5. Create a Biomeme ID. This will be your Biomeme login ID and cannot be changed, so think of one that is secure and easy to remember. 6. Create a Biomeme password. You can change your password at any time. 7. Enter your Password Reset Question and Answer. This can be used at a later time if you forget your password. 8. Enter your e-mail address. You will receive e-mail notification when new information is available in [address] 9. Click Sign Up. You can now view and download portions of your medical record. 10. Click the Download Summary menu link to download a portable copy of your medical information. If you have questions, please visit the Frequently Asked Questions section of the Biomeme website. Remember, Biomeme is NOT to be used for urgent needs. For medical emergencies, dial 911. Now available from your iPhone and Android! Please provide this summary of care documentation to your next provider. Your primary care clinician is listed as Suzanna Smart. Jair Stevens. If you have any questions after today's visit, please call [phone].

## 2018-10-04 ENCOUNTER — TELEPHONE (OUTPATIENT)
Dept: FAMILY MEDICINE CLINIC | Age: 68
End: 2018-10-04

## 2018-10-04 NOTE — TELEPHONE ENCOUNTER
Patient calling stating that \"my head feels so full, nose congested with clear mucus, eyes hurst and feels like she had had fevers and complains of labor breathing\" patient was able to speak in complete sentences over the phone without SOB. Patient states that she finished her antibiotic and although some of the symptoms have improved like the cough she still feels very sick. Discussed with patient that due to that she is complaining of labored breathing I would like for her to be assess by a provider. No appointment to give to patient today, so recommended patient to go to the [address] [place] [address] urgent care to get further assess- clinic address provided. Patient verbalized understanding and agreed to going to the urgent care.

## 2018-11-28 DIAGNOSIS — M79.10 MUSCLE PAIN: ICD-10-CM

## 2018-11-28 RX ORDER — CYCLOBENZAPRINE HCL 10 MG
TABLET ORAL
Qty: 30 TAB | Refills: 0 | Status: ON HOLD | OUTPATIENT
Start: 2018-11-28 | End: 2021-09-01

## 2018-11-28 NOTE — TELEPHONE ENCOUNTER
Fax from [facility] received asking for refill of pt's Cyclobenzaprine 10 mg tablets. Routing to Dr. Gisele Llamas for review. Pt was last seen on 9/27/18 for OV.  Chucky Cooper RN

## 2019-06-14 DIAGNOSIS — I10 ESSENTIAL HYPERTENSION WITH GOAL BLOOD PRESSURE LESS THAN 140/90: ICD-10-CM

## 2019-06-14 RX ORDER — LISINOPRIL AND HYDROCHLOROTHIAZIDE 10; 12.5 MG/1; MG/1
TABLET ORAL
Qty: 30 TAB | Refills: 1 | Status: ON HOLD | OUTPATIENT
Start: 2019-06-14 | End: 2021-09-01

## 2019-06-14 NOTE — TELEPHONE ENCOUNTER
Refill sent for 30 tabs with 1 refill  Pt needs to return for HTN f/u appt with labs in the next 2-3 months  Routing to Nurse and PSR to help with appt

## 2019-06-14 NOTE — TELEPHONE ENCOUNTER
Pharmacist at [facility] calling requesting a refill for patient for Lisinopril-HCTZ. [facility] patient last seen 09/2018. Routing to provider for review.

## 2019-06-17 NOTE — TELEPHONE ENCOUNTER
Tc to pt to let her know her rx had been approved and rx sent to the Pharmacy. The Pharm the rx was sent to was left on the identifiable VM. Pt [place] [employer] per chart review. Routing to provider.  Ulysses Mesa, RN

## 2019-08-07 ENCOUNTER — TELEPHONE (OUTPATIENT)
Dept: FAMILY MEDICINE CLINIC | Age: 69
End: 2019-08-07

## 2019-08-07 NOTE — TELEPHONE ENCOUNTER
I called pt to see if she is seeing a PCP anywhere. I had to LM for her to call us back. I returned phone call to [facility] and Des  is not in today. Pharmacist was not sure why Des  called. I did tell pharmacy to please note to pt that she must see a PCP for refills. They noticed that pt never picked up Nani refill that was sent in by Dr. Smitha Lopez.  Ba Alcantar RN

## 2019-08-09 ENCOUNTER — TELEPHONE (OUTPATIENT)
Dept: FAMILY MEDICINE CLINIC | Age: 69
End: 2019-08-09

## 2019-08-09 NOTE — TELEPHONE ENCOUNTER
A representative from [facility] wanted to talk to a nurse about a prescription . Paroxetine(PAXIL)40mg.     Thank you

## 2019-09-05 ENCOUNTER — HOSPITAL ENCOUNTER (OUTPATIENT)
Dept: MAMMOGRAPHY | Age: 69
Discharge: HOME | End: 2019-09-05
Attending: FAMILY MEDICINE
Payer: MEDICARE

## 2019-09-05 DIAGNOSIS — Z12.39 SCREENING BREAST EXAMINATION: ICD-10-CM

## 2019-09-05 PROCEDURE — 77067 SCR MAMMO BI INCL CAD: CPT

## 2019-09-20 ENCOUNTER — HOSPITAL ENCOUNTER (OUTPATIENT)
Dept: MAMMOGRAPHY | Age: 69
Discharge: HOME | End: 2019-09-20
Payer: MEDICARE

## 2019-09-20 DIAGNOSIS — R92.8 ABNORMAL MAMMOGRAM: ICD-10-CM

## 2019-09-20 PROCEDURE — 77061 BREAST TOMOSYNTHESIS UNI: CPT

## 2019-09-20 PROCEDURE — 76642 ULTRASOUND BREAST LIMITED: CPT

## 2020-12-01 ENCOUNTER — TRANSCRIBE ORDER (OUTPATIENT)
Dept: SCHEDULING | Age: 70
End: 2020-12-01

## 2020-12-01 DIAGNOSIS — Z12.31 BREAST CANCER SCREENING BY MAMMOGRAM: Primary | ICD-10-CM

## 2021-07-26 ENCOUNTER — APPOINTMENT (OUTPATIENT)
Dept: PHYSICAL THERAPY | Age: 71
End: 2021-07-26

## 2021-08-20 ENCOUNTER — OFFICE VISIT (OUTPATIENT)
Dept: HEMATOLOGY | Age: 71
End: 2021-08-20
Payer: MEDICARE

## 2021-08-20 VITALS
SYSTOLIC BLOOD PRESSURE: 162 MMHG | RESPIRATION RATE: 16 BRPM | WEIGHT: 141 LBS | TEMPERATURE: 98.9 F | BODY MASS INDEX: 24.07 KG/M2 | HEART RATE: 74 BPM | OXYGEN SATURATION: 98 % | HEIGHT: 64 IN | DIASTOLIC BLOOD PRESSURE: 81 MMHG

## 2021-08-20 DIAGNOSIS — B18.2 CHRONIC HEPATITIS C WITHOUT HEPATIC COMA (HCC): Primary | ICD-10-CM

## 2021-08-20 PROCEDURE — 99203 OFFICE O/P NEW LOW 30 MIN: CPT | Performed by: INTERNAL MEDICINE

## 2021-08-20 PROCEDURE — G9899 SCRN MAM PERF RSLTS DOC: HCPCS | Performed by: INTERNAL MEDICINE

## 2021-08-20 PROCEDURE — G8427 DOCREV CUR MEDS BY ELIG CLIN: HCPCS | Performed by: INTERNAL MEDICINE

## 2021-08-20 PROCEDURE — 1101F PT FALLS ASSESS-DOCD LE1/YR: CPT | Performed by: INTERNAL MEDICINE

## 2021-08-20 PROCEDURE — G9717 DOC PT DX DEP/BP F/U NT REQ: HCPCS | Performed by: INTERNAL MEDICINE

## 2021-08-20 PROCEDURE — G8753 SYS BP > OR = 140: HCPCS | Performed by: INTERNAL MEDICINE

## 2021-08-20 PROCEDURE — G8536 NO DOC ELDER MAL SCRN: HCPCS | Performed by: INTERNAL MEDICINE

## 2021-08-20 PROCEDURE — G8420 CALC BMI NORM PARAMETERS: HCPCS | Performed by: INTERNAL MEDICINE

## 2021-08-20 PROCEDURE — G8754 DIAS BP LESS 90: HCPCS | Performed by: INTERNAL MEDICINE

## 2021-08-20 PROCEDURE — 1090F PRES/ABSN URINE INCON ASSESS: CPT | Performed by: INTERNAL MEDICINE

## 2021-08-20 PROCEDURE — 3017F COLORECTAL CA SCREEN DOC REV: CPT | Performed by: INTERNAL MEDICINE

## 2021-08-20 RX ORDER — ROSUVASTATIN CALCIUM 5 MG/1
5 TABLET, COATED ORAL DAILY
COMMUNITY
Start: 2021-07-03 | End: 2022-05-12

## 2021-08-20 RX ORDER — DICLOFENAC SODIUM 10 MG/G
GEL TOPICAL
Status: ON HOLD | COMMUNITY
Start: 2021-07-12 | End: 2021-09-01

## 2021-08-20 RX ORDER — METHIMAZOLE 5 MG/1
TABLET ORAL
Status: ON HOLD | COMMUNITY
Start: 2021-07-26 | End: 2021-09-01

## 2021-08-20 NOTE — PROGRESS NOTES
Krissy iVlla is a 69 y.o. female    Chief Complaint   Patient presents with    New Patient     Elevated Liver Enzymes     1. Have you been to the ER, urgent care clinic since your last visit? Hospitalized since your last visit? No     2. Have you seen or consulted any other health care providers outside of the [address] [place] [address] since your last visit? Include any pap smears or colon screening.   No     Visit Vitals  BP (!) 162/81   Pulse 74   Temp 98.9 °F (37.2 °C)   Resp 16   Ht 5' 4\" (1.626 m)   Wt 141 lb (64 kg)   SpO2 98%   BMI 24.20 kg/m²

## 2021-08-20 NOTE — PROGRESS NOTES
Sandra Canales [address]      Coby Judge MD, Bill Hewitt, Saba Harry MD, MPH      Melda Sandifer, PA-C    Jake Ortiz, ACNP-BC     Sweta Novoa, [facility]-BC   Kavita Kaplan, FNP-C    Amirah Moody, PCNP-BC       Radha Kellogg [facility] [place] [address]    at [address], [address] [place] [address] Britney Reyna  22.    [phone]    FAX: [address] [place] [address], [address]    [phone]    FAX: [phone]       Patient Care Team:  Theron Lowery DO as PCP - General (Family Medicine)  Theron Lowery DO as PCP - [facility] Provider      Problem List  Date Reviewed: 8/20/2021        Codes Class Noted    Essential hypertension with goal blood pressure less than 140/90 ICD-10-CM: I10  ICD-9-CM: 401.9  9/7/2016        Osteoporosis ICD-10-CM: M81.0  ICD-9-CM: 733.00  7/7/2016        Pure hypercholesterolemia ICD-10-CM: E78.00  ICD-9-CM: 272.0  5/16/2016        Primary osteoarthritis of both knees ICD-10-CM: M17.0  ICD-9-CM: 715.16  8/24/2015    Overview Signed 11/12/2018  5:24 PM by María Villavicencio MD     Seen by Dr. Martine Toledo 11/2018 - planned for TKA in Jan 2019             Hepatitis C, chronic [facility]) ICD-10-CM: B18.2  ICD-9-CM: 070.54  1/6/2014    Overview Signed 1/6/2014  1:44 PM by Radha Simmons in past.  Had side effects from treatment and had to stop. Onychomycosis ICD-10-CM: B35.1  ICD-9-CM: 110.1  1/6/2014        Depression ICD-10-CM: F32.9  ICD-9-CM: 848  4/16/2013        Diabetes mellitus type 2, controlled ([facility] [address] ICD-10-CM: E11.9  ICD-9-CM: 250.00  4/16/2013              ADDENDUM:  Repeat HCV RNA was positive at log 6.4 inu. Will contact her and set her up to treat HCV with a DAA.     Coby Judge MD  [facility] [place] of [address] [address] Dariusz Reyna, [address] [address].  [phone]  [address]      The patient comes on her own regarding chronic HCV and its management. All medical records sent by the referring physicians were reviewed including imaging studies and pathology. The patient is a 69 y.o.  female who was found to have abnormalities in liver chemistries and subsequently tested positive for chronic HCV in 1990s. She was treated with peginterferon and ribavirin in the early 2000s. The patient achieved a SVR/cure. She would like to undergo repeat liver biopsy and see if all scarring has resolved. Assessment of liver fibrosis with Fibroscan was performed in the office today. The result was 6.3 kPa which correlates with stage 1 portal fibrosis. The CAP score of 247 suggests there is no hepatic steatosis. has the following symptoms which could be attributed to the liver disorder:    fatigue,   pain in the right side over the liver,     The patient is not experiencing the following symptoms which are commonly seen in this liver disorder:   problems concentrating,   swelling of the abdomen,   swelling of the lower extremities,   hematemesis,   hematochezia. The patient completes all daily activities without any functional limitations. ASSESSMENT AND PLAN:  Chronic HCV   Chronic HCV with stage 1 fibrosis. Severity of the liver disease was assessed by Fibroscan in 8/2021. AST is elevated. ALT is normal.  ALP is normal.  The platelet count is normal.      Based upon laboratory studies Fibroscan, and imaging the patient does not appear to have advanced liver disease. Will perform laboratory testing to monitor liver function and degree of liver injury. This included BMP, hepatic panel, CBC with platelet count, INR.       Will perform and/or review results of HCV viral load,     Chronic HCV Treatment  The patient has been treated for HCV with peginterferon and ribavirin in about 20002    The patient has achieved sustained virologic response/cure. Will check HCV RNA. IF this is negative no further testing for HCV is necessary. Screening for Hepatocellular Carcinoma  HCC screening is not necessary since the patient has no evidence of cirrhosis. Treatment of other medical problems in patients with chronic liver disease  There are no contraindications for the patient to take most medications that are necessary for treatment of other medical issues. Counseling for alcohol in patients with chronic liver disease  The patient was counseled regarding alcohol consumption and the effect of alcohol on chronic liver disease. The patient does not consume any significant amount of alcohol. Vaccinations   Routine vaccinations against other bacterial and viral agents can be performed as indicated. Annual flu vaccination should be administered if indicated. ALLERGIES  Allergies   Allergen Reactions    Amoxicillin Nausea and Vomiting    Penicillin G Nausea and Vomiting     Pt can't remember that reaction it caused. MEDICATIONS  Current Outpatient Medications   Medication Sig    lisinopril-hydroCHLOROthiazide (PRINZIDE, ZESTORETIC) 10-12.5 mg per tablet TAKE 1 TABLET BY MOUTH EVERY DAY    PARoxetine (PAXIL) 40 mg tablet TAKE 1 TABLET BY MOUTH EVERY DAY    Blood-Glucose Meter (TRUE METRIX AIR GLUCOSE METER) misc Use as directed.  glucose blood VI test strips (TRUE METRIX GLUCOSE TEST STRIP) strip Use to check glucose twice a day.  lancets (TRUEPLUS LANCETS) 28 gauge misc Use to check glucose twice a day.  Blood Glucose Control, Low (TRUE METRIX LEVEL 1) soln Use as directed.  alcohol swabs (BD SINGLE USE SWABS REGULAR) padm Use as directed.  diclofenac (VOLTAREN) 1 % gel PLEASE SEE ATTACHED FOR DETAILED DIRECTIONS    rosuvastatin (CRESTOR) 5 mg tablet Take 5 mg by mouth daily.     methIMAzole (TAPAZOLE) 5 mg tablet TAKE 1/2 TABLET (2.5 MG) DAILY    cyclobenzaprine (FLEXERIL) 10 mg tablet TAKE 1 TABLET BY MOUTH 3 TIMES A DAY AS NEEDED FOR MUSCLE SPASMS (Patient not taking: Reported on 2021)    metFORMIN ER (GLUCOPHAGE XR) 500 mg tablet Take 1 Tab by mouth daily (with dinner).  nicotine (NICODERM CQ) 7 mg/24 hr APPLY 1 PATCH TO SKIN EVERY DAY FOR 2 TO 4 WEEKS (Patient not taking: Reported on 2021)     No current facility-administered medications for this visit. SYSTEM REVIEW NOT RELATED TO LIVER DISEASE OR REVIEWED ABOVE:  Constitution systems: Negative for fever, chills, weight gain, weight loss. Eyes: Negative for visual changes. ENT: Negative for sore throat, painful swallowing. Respiratory: Negative for cough, hemoptysis, SOB. Cardiology: Negative for chest pain, palpitations. GI:  Negative for constipation or diarrhea. : Negative for urinary frequency, dysuria, hematuria, nocturia. Skin: Negative for rash. Hematology: Negative for easy bruising, blood clots. Musculo-skelatal: Negative for back pain, muscle pain, weakness. Neurologic: Negative for headaches, dizziness, vertigo, memory problems not related to HE. Psychology: Negative for anxiety, depression. FAMILY HISTORY:  The patient has no knowledge of the father's medical condition. The mother  of cancer. There is no family history of liver disease. SOCIAL HISTORY:  The patient is . She has not seen spouse in 21 years. The patient has 1 child and 2 grandchildren. The patient currently smokes 1/2 pack of tobacco daily. The patient has never consumed significant amounts of alcohol. The patient has never worked. PHYSICAL EXAMINATION:  Visit Vitals  BP (!) 162/81   Pulse 74   Temp 98.9 °F (37.2 °C)   Resp 16   Ht 5' 4\" (1.626 m)   Wt 141 lb (64 kg)   SpO2 98%   BMI 24.20 kg/m²     General: No acute distress. Eyes: Sclera anicteric. ENT: No oral lesions.   Thyroid normal.  Nodes: No adenopathy. Skin: No spider angiomata. No jaundice. No palmar erythema. Respiratory: Lungs clear to auscultation. Cardiovascular: Regular heart rate. No murmurs. No JVD. Abdomen: Soft non-tender. Liver size normal to percussion/palpation. Spleen not palpable. No obvious ascites. Extremities: No edema. No muscle wasting. No gross arthritic changes. Neurologic: Alert and oriented. Cranial nerves grossly intact. No asterixis. LABORATORY STUDIES:  Liver [place] of [address] Units 11/1/2017   WBC 3.6 - 11.0 K/uL    ANC 1.8 - 8.0 K/UL    HGB 11.5 - 16.0 g/dL     - 400 K/uL    AST 15 - 37 U/L 43 (H)   ALT 12 - 78 U/L 55   Alk Phos 45 - 117 U/L 112   Bili, Total 0.2 - 1.0 MG/DL 0.9   Albumin 3.5 - 5.0 g/dL 3.6   BUN 6 - 20 MG/DL 11   Creat 0.55 - 1.02 MG/DL 0.46 (L)   Na 136 - 145 mmol/L 142   K 3.5 - 5.1 mmol/L 4.5   Cl 97 - 108 mmol/L 105   CO2 21 - 32 mmol/L 30   Glucose 65 - 100 mg/dL 96     SEROLOGIES:  9/2021. HCV RNA Log 6.43 intU. LIVER HISTOLOGY:  8/2021. FibroScan performed at [address]. EkPa was 6.3. IQR/med 11%. . The results suggested a fibrosis level of F1. The CAP score suggests there is no significant hepatic steatosis. ENDOSCOPIC PROCEDURES:  Not available or performed    RADIOLOGY:  11/2017. Ultrasound of liver. Echogenic consistent with chronic liver disease. No liver mass lesions. No dilated bile ducts. No ascites. OTHER TESTING:  Not available or performed    FOLLOW-UP:  All of the issues listed above in the Assessment and Plan were discussed with the patient. All questions were answered. The patient expressed a clear understanding of the above. No follow-up at [address] is needed. I would be glad to see the patient back for follow-up at any time in the future if the clinical situation changes. I will call with HCV RNA results.       Aminata Gomez MD  [facility] of [address], [address] [place] [address], [facility] 22.  [address] [facility]

## 2021-09-01 ENCOUNTER — APPOINTMENT (OUTPATIENT)
Dept: CT IMAGING | Age: 71
DRG: 208 | End: 2021-09-01
Attending: EMERGENCY MEDICINE
Payer: MEDICARE

## 2021-09-01 ENCOUNTER — HOSPITAL ENCOUNTER (INPATIENT)
Age: 71
LOS: 3 days | Discharge: HOME | DRG: 208 | End: 2021-09-04
Attending: EMERGENCY MEDICINE | Admitting: HOSPITALIST
Payer: MEDICARE

## 2021-09-01 ENCOUNTER — APPOINTMENT (OUTPATIENT)
Dept: GENERAL RADIOLOGY | Age: 71
DRG: 208 | End: 2021-09-01
Attending: EMERGENCY MEDICINE
Payer: MEDICARE

## 2021-09-01 DIAGNOSIS — G93.41 ACUTE METABOLIC ENCEPHALOPATHY: ICD-10-CM

## 2021-09-01 DIAGNOSIS — R41.82 ALTERED MENTAL STATUS, UNSPECIFIED ALTERED MENTAL STATUS TYPE: Primary | ICD-10-CM

## 2021-09-01 DIAGNOSIS — R77.8 ELEVATED TROPONIN: ICD-10-CM

## 2021-09-01 DIAGNOSIS — Z97.8 ENDOTRACHEALLY INTUBATED: ICD-10-CM

## 2021-09-01 LAB
ALBUMIN SERPL-MCNC: 4 G/DL (ref 3.5–5)
ALBUMIN/GLOB SERPL: 0.9 {RATIO} (ref 1.1–2.2)
ALP SERPL-CCNC: 138 U/L (ref 45–117)
ALT SERPL-CCNC: 77 U/L (ref 12–78)
AMMONIA PLAS-SCNC: 37 UMOL/L
AMPHET UR QL SCN: NEGATIVE
ANION GAP SERPL CALC-SCNC: 9 MMOL/L (ref 5–15)
APAP SERPL-MCNC: <2 UG/ML (ref 10–30)
APPEARANCE UR: ABNORMAL
APTT PPP: 26.1 SEC (ref 22.1–31)
ARTERIAL PATENCY WRIST A: POSITIVE
ARTERIAL PATENCY WRIST A: YES
AST SERPL-CCNC: 105 U/L (ref 15–37)
BACTERIA URNS QL MICRO: NEGATIVE /HPF
BARBITURATES UR QL SCN: NEGATIVE
BASE DEFICIT BLD-SCNC: 7.2 MMOL/L
BASE DEFICIT BLDA-SCNC: 8.9 MMOL/L
BASOPHILS # BLD: 0.1 K/UL (ref 0–0.1)
BASOPHILS NFR BLD: 1 % (ref 0–1)
BDY SITE: ABNORMAL
BDY SITE: ABNORMAL
BENZODIAZ UR QL: NEGATIVE
BILIRUB SERPL-MCNC: 0.4 MG/DL (ref 0.2–1)
BILIRUB UR QL: NEGATIVE
BNP SERPL-MCNC: 211 PG/ML
BUN SERPL-MCNC: 28 MG/DL (ref 6–20)
BUN/CREAT SERPL: 24 (ref 12–20)
CALCIUM SERPL-MCNC: 8.6 MG/DL (ref 8.5–10.1)
CANNABINOIDS UR QL SCN: POSITIVE
CHLORIDE SERPL-SCNC: 109 MMOL/L (ref 97–108)
CK SERPL-CCNC: 1060 U/L (ref 26–192)
CO2 SERPL-SCNC: 25 MMOL/L (ref 21–32)
COCAINE UR QL SCN: POSITIVE
COLOR UR: ABNORMAL
COVID-19 RAPID TEST, COVR: DETECTED
CREAT SERPL-MCNC: 1.16 MG/DL (ref 0.55–1.02)
D DIMER PPP FEU-MCNC: 9.69 MG/L FEU (ref 0–0.65)
DIFFERENTIAL METHOD BLD: ABNORMAL
DRUG SCRN COMMENT,DRGCM: ABNORMAL
EOSINOPHIL # BLD: 0.1 K/UL (ref 0–0.4)
EOSINOPHIL NFR BLD: 0 % (ref 0–7)
EPITH CASTS URNS QL MICRO: ABNORMAL /LPF
ERYTHROCYTE [DISTWIDTH] IN BLOOD BY AUTOMATED COUNT: 13 % (ref 11.5–14.5)
ETHANOL SERPL-MCNC: <10 MG/DL
FERRITIN SERPL-MCNC: 940 NG/ML (ref 26–388)
FIBRINOGEN PPP-MCNC: 293 MG/DL (ref 200–475)
FIO2 ON VENT: 40 %
GAS FLOW.O2 O2 DELIVERY SYS: ABNORMAL L/MIN
GAS FLOW.O2 SETTING OXYMISER: 16 BPM
GAS FLOW.O2 SETTING OXYMISER: 20 L/MIN
GLOBULIN SER CALC-MCNC: 4.3 G/DL (ref 2–4)
GLUCOSE SERPL-MCNC: 223 MG/DL (ref 65–100)
GLUCOSE UR STRIP.AUTO-MCNC: 100 MG/DL
GRAN CASTS URNS QL MICRO: ABNORMAL /LPF
HCO3 BLD-SCNC: 25.1 MMOL/L (ref 22–26)
HCO3 BLDA-SCNC: 18 MMOL/L (ref 22–26)
HCT VFR BLD AUTO: 51.2 % (ref 35–47)
HGB BLD-MCNC: 16.1 G/DL (ref 11.5–16)
HGB UR QL STRIP: ABNORMAL
HYALINE CASTS URNS QL MICRO: ABNORMAL /LPF (ref 0–5)
IMM GRANULOCYTES # BLD AUTO: 0.2 K/UL (ref 0–0.04)
IMM GRANULOCYTES NFR BLD AUTO: 1 % (ref 0–0.5)
KETONES UR QL STRIP.AUTO: NEGATIVE MG/DL
LACTATE SERPL-SCNC: 2.4 MMOL/L (ref 0.4–2)
LDH SERPL L TO P-CCNC: 369 U/L (ref 81–246)
LEUKOCYTE ESTERASE UR QL STRIP.AUTO: NEGATIVE
LIPASE SERPL-CCNC: 155 U/L (ref 73–393)
LYMPHOCYTES # BLD: 2.3 K/UL (ref 0.8–3.5)
LYMPHOCYTES NFR BLD: 14 % (ref 12–49)
MAGNESIUM SERPL-MCNC: 2.5 MG/DL (ref 1.6–2.4)
MCH RBC QN AUTO: 30.3 PG (ref 26–34)
MCHC RBC AUTO-ENTMCNC: 31.4 G/DL (ref 30–36.5)
MCV RBC AUTO: 96.4 FL (ref 80–99)
METHADONE UR QL: NEGATIVE
MONOCYTES # BLD: 1.5 K/UL (ref 0–1)
MONOCYTES NFR BLD: 9 % (ref 5–13)
NEUTS SEG # BLD: 12.5 K/UL (ref 1.8–8)
NEUTS SEG NFR BLD: 75 % (ref 32–75)
NITRITE UR QL STRIP.AUTO: NEGATIVE
NRBC # BLD: 0 K/UL (ref 0–0.01)
NRBC BLD-RTO: 0 PER 100 WBC
O2/TOTAL GAS SETTING VFR VENT: 60 %
OPIATES UR QL: POSITIVE
PCO2 BLD: 80.4 MMHG (ref 35–45)
PCO2 BLDA: 42 MMHG (ref 35–45)
PCP UR QL: NEGATIVE
PEEP RESPIRATORY: 8 CMH2O
PEEP RESPIRATORY: 8 CM[H2O]
PH BLD: 7.1 [PH] (ref 7.35–7.45)
PH BLDA: 7.25 [PH] (ref 7.35–7.45)
PH UR STRIP: 5.5 [PH] (ref 5–8)
PLATELET # BLD AUTO: 216 K/UL (ref 150–400)
PMV BLD AUTO: 13 FL (ref 8.9–12.9)
PO2 BLD: 263 MMHG (ref 80–100)
PO2 BLDA: 123 MMHG (ref 80–100)
POTASSIUM SERPL-SCNC: 5 MMOL/L (ref 3.5–5.1)
PROCALCITONIN SERPL-MCNC: 0.28 NG/ML
PROT SERPL-MCNC: 8.3 G/DL (ref 6.4–8.2)
PROT UR STRIP-MCNC: 30 MG/DL
RBC # BLD AUTO: 5.31 M/UL (ref 3.8–5.2)
RBC #/AREA URNS HPF: ABNORMAL /HPF (ref 0–5)
SALICYLATES SERPL-MCNC: 2.6 MG/DL (ref 2.8–20)
SAO2 % BLD: 98 % (ref 92–97)
SAO2 % BLD: 99.7 % (ref 92–97)
SAO2% DEVICE SAO2% SENSOR NAME: ABNORMAL
SARS-COV-2, COV2: NORMAL
SERVICE CMNT-IMP: ABNORMAL
SERVICE CMNT-IMP: ABNORMAL
SODIUM SERPL-SCNC: 143 MMOL/L (ref 136–145)
SOURCE, COVRS: ABNORMAL
SP GR UR REFRACTOMETRY: 1.02 (ref 1–1.03)
SPECIMEN SITE: ABNORMAL
SPECIMEN TYPE: ABNORMAL
THERAPEUTIC RANGE,PTTT: NORMAL SECS (ref 58–77)
TROPONIN I SERPL-MCNC: 0.16 NG/ML
TSH SERPL DL<=0.05 MIU/L-ACNC: 2.75 UIU/ML (ref 0.36–3.74)
UROBILINOGEN UR QL STRIP.AUTO: 0.2 EU/DL (ref 0.2–1)
VENTILATION MODE VENT: ABNORMAL
VENTILATION MODE VENT: ABNORMAL
VT SETTING VENT: 350 ML
VT SETTING VENT: 350 ML
WBC # BLD AUTO: 16.7 K/UL (ref 3.6–11)
WBC URNS QL MICRO: ABNORMAL /HPF (ref 0–4)

## 2021-09-01 PROCEDURE — 0042T CT CODE NEURO PERF W CBF: CPT

## 2021-09-01 PROCEDURE — 36415 COLL VENOUS BLD VENIPUNCTURE: CPT

## 2021-09-01 PROCEDURE — 85384 FIBRINOGEN ACTIVITY: CPT

## 2021-09-01 PROCEDURE — 81001 URINALYSIS AUTO W/SCOPE: CPT

## 2021-09-01 PROCEDURE — 36600 WITHDRAWAL OF ARTERIAL BLOOD: CPT

## 2021-09-01 PROCEDURE — 83735 ASSAY OF MAGNESIUM: CPT

## 2021-09-01 PROCEDURE — 93005 ELECTROCARDIOGRAM TRACING: CPT

## 2021-09-01 PROCEDURE — 70450 CT HEAD/BRAIN W/O DYE: CPT

## 2021-09-01 PROCEDURE — 87635 SARS-COV-2 COVID-19 AMP PRB: CPT

## 2021-09-01 PROCEDURE — 83615 LACTATE (LD) (LDH) ENZYME: CPT

## 2021-09-01 PROCEDURE — 94002 VENT MGMT INPAT INIT DAY: CPT

## 2021-09-01 PROCEDURE — 77030008771 HC TU NG SALEM SUMP -A

## 2021-09-01 PROCEDURE — 82077 ASSAY SPEC XCP UR&BREATH IA: CPT

## 2021-09-01 PROCEDURE — 74011000258 HC RX REV CODE- 258: Performed by: INTERNAL MEDICINE

## 2021-09-01 PROCEDURE — 96374 THER/PROPH/DIAG INJ IV PUSH: CPT

## 2021-09-01 PROCEDURE — 74011000250 HC RX REV CODE- 250: Performed by: EMERGENCY MEDICINE

## 2021-09-01 PROCEDURE — 87040 BLOOD CULTURE FOR BACTERIA: CPT

## 2021-09-01 PROCEDURE — 82728 ASSAY OF FERRITIN: CPT

## 2021-09-01 PROCEDURE — 31500 INSERT EMERGENCY AIRWAY: CPT

## 2021-09-01 PROCEDURE — 5A1935Z RESPIRATORY VENTILATION, LESS THAN 24 CONSECUTIVE HOURS: ICD-10-PCS | Performed by: INTERNAL MEDICINE

## 2021-09-01 PROCEDURE — 4A03X5D MEASUREMENT OF ARTERIAL FLOW, INTRACRANIAL, EXTERNAL APPROACH: ICD-10-PCS | Performed by: RADIOLOGY

## 2021-09-01 PROCEDURE — 74011250636 HC RX REV CODE- 250/636: Performed by: EMERGENCY MEDICINE

## 2021-09-01 PROCEDURE — 82803 BLOOD GASES ANY COMBINATION: CPT

## 2021-09-01 PROCEDURE — 70496 CT ANGIOGRAPHY HEAD: CPT

## 2021-09-01 PROCEDURE — 74011250637 HC RX REV CODE- 250/637: Performed by: HOSPITALIST

## 2021-09-01 PROCEDURE — 71045 X-RAY EXAM CHEST 1 VIEW: CPT

## 2021-09-01 PROCEDURE — 65610000006 HC RM INTENSIVE CARE

## 2021-09-01 PROCEDURE — 77030005513 HC CATH URETH FOL11 MDII -B

## 2021-09-01 PROCEDURE — 83690 ASSAY OF LIPASE: CPT

## 2021-09-01 PROCEDURE — 80179 DRUG ASSAY SALICYLATE: CPT

## 2021-09-01 PROCEDURE — 74011000636 HC RX REV CODE- 636: Performed by: RADIOLOGY

## 2021-09-01 PROCEDURE — 74011250636 HC RX REV CODE- 250/636: Performed by: HOSPITALIST

## 2021-09-01 PROCEDURE — 83880 ASSAY OF NATRIURETIC PEPTIDE: CPT

## 2021-09-01 PROCEDURE — 80143 DRUG ASSAY ACETAMINOPHEN: CPT

## 2021-09-01 PROCEDURE — 74011000250 HC RX REV CODE- 250: Performed by: INTERNAL MEDICINE

## 2021-09-01 PROCEDURE — 80053 COMPREHEN METABOLIC PANEL: CPT

## 2021-09-01 PROCEDURE — 99285 EMERGENCY DEPT VISIT HI MDM: CPT

## 2021-09-01 PROCEDURE — 85025 COMPLETE CBC W/AUTO DIFF WBC: CPT

## 2021-09-01 PROCEDURE — 85379 FIBRIN DEGRADATION QUANT: CPT

## 2021-09-01 PROCEDURE — 74011000258 HC RX REV CODE- 258: Performed by: HOSPITALIST

## 2021-09-01 PROCEDURE — 84145 PROCALCITONIN (PCT): CPT

## 2021-09-01 PROCEDURE — 87086 URINE CULTURE/COLONY COUNT: CPT

## 2021-09-01 PROCEDURE — 80307 DRUG TEST PRSMV CHEM ANLYZR: CPT

## 2021-09-01 PROCEDURE — 82550 ASSAY OF CK (CPK): CPT

## 2021-09-01 PROCEDURE — 83605 ASSAY OF LACTIC ACID: CPT

## 2021-09-01 PROCEDURE — 77030019905 HC CATH URETH INTMIT MDII -A

## 2021-09-01 PROCEDURE — 84443 ASSAY THYROID STIM HORMONE: CPT

## 2021-09-01 PROCEDURE — 2709999900 HC NON-CHARGEABLE SUPPLY

## 2021-09-01 PROCEDURE — 96375 TX/PRO/DX INJ NEW DRUG ADDON: CPT

## 2021-09-01 PROCEDURE — 84484 ASSAY OF TROPONIN QUANT: CPT

## 2021-09-01 PROCEDURE — 85730 THROMBOPLASTIN TIME PARTIAL: CPT

## 2021-09-01 PROCEDURE — 82140 ASSAY OF AMMONIA: CPT

## 2021-09-01 PROCEDURE — 74011250636 HC RX REV CODE- 250/636: Performed by: INTERNAL MEDICINE

## 2021-09-01 PROCEDURE — 0BH17EZ INSERTION OF ENDOTRACHEAL AIRWAY INTO TRACHEA, VIA NATURAL OR ARTIFICIAL OPENING: ICD-10-PCS | Performed by: INTERNAL MEDICINE

## 2021-09-01 PROCEDURE — U0005 INFEC AGEN DETEC AMPLI PROBE: HCPCS

## 2021-09-01 RX ORDER — HEPARIN SODIUM 10000 [USP'U]/100ML
12-25 INJECTION, SOLUTION INTRAVENOUS
Status: DISCONTINUED | OUTPATIENT
Start: 2021-09-01 | End: 2021-09-02

## 2021-09-01 RX ORDER — POTASSIUM CHLORIDE 20 MEQ/1
20 TABLET, EXTENDED RELEASE ORAL DAILY
COMMUNITY
End: 2021-12-03

## 2021-09-01 RX ORDER — ENOXAPARIN SODIUM 100 MG/ML
30 INJECTION SUBCUTANEOUS EVERY 12 HOURS
Status: DISCONTINUED | OUTPATIENT
Start: 2021-09-01 | End: 2021-09-01

## 2021-09-01 RX ORDER — HEPARIN SODIUM 5000 [USP'U]/ML
5000 INJECTION, SOLUTION INTRAVENOUS; SUBCUTANEOUS EVERY 12 HOURS
Status: DISCONTINUED | OUTPATIENT
Start: 2021-09-01 | End: 2021-09-01 | Stop reason: SDUPTHER

## 2021-09-01 RX ORDER — ACETAMINOPHEN 325 MG/1
650 TABLET ORAL
Status: DISCONTINUED | OUTPATIENT
Start: 2021-09-01 | End: 2021-09-03

## 2021-09-01 RX ORDER — PROPOFOL 10 MG/ML
0-50 VIAL (ML) INTRAVENOUS
Status: DISCONTINUED | OUTPATIENT
Start: 2021-09-01 | End: 2021-09-01

## 2021-09-01 RX ORDER — IPRATROPIUM BROMIDE AND ALBUTEROL SULFATE 2.5; .5 MG/3ML; MG/3ML
3 SOLUTION RESPIRATORY (INHALATION)
Status: DISCONTINUED | OUTPATIENT
Start: 2021-09-01 | End: 2021-09-02

## 2021-09-01 RX ORDER — ASPIRIN 300 MG/1
300 SUPPOSITORY RECTAL DAILY
Status: DISCONTINUED | OUTPATIENT
Start: 2021-09-01 | End: 2021-09-02

## 2021-09-01 RX ORDER — HYDRALAZINE HYDROCHLORIDE 20 MG/ML
10 INJECTION INTRAMUSCULAR; INTRAVENOUS
Status: DISCONTINUED | OUTPATIENT
Start: 2021-09-01 | End: 2021-09-04 | Stop reason: HOSPADM

## 2021-09-01 RX ORDER — SODIUM CHLORIDE, SODIUM LACTATE, POTASSIUM CHLORIDE, CALCIUM CHLORIDE 600; 310; 30; 20 MG/100ML; MG/100ML; MG/100ML; MG/100ML
125 INJECTION, SOLUTION INTRAVENOUS CONTINUOUS
Status: DISCONTINUED | OUTPATIENT
Start: 2021-09-01 | End: 2021-09-02

## 2021-09-01 RX ORDER — HEPARIN SODIUM 5000 [USP'U]/ML
60 INJECTION, SOLUTION INTRAVENOUS; SUBCUTANEOUS ONCE
Status: COMPLETED | OUTPATIENT
Start: 2021-09-01 | End: 2021-09-01

## 2021-09-01 RX ORDER — LISINOPRIL 2.5 MG/1
2.5 TABLET ORAL DAILY
COMMUNITY
End: 2022-05-12

## 2021-09-01 RX ORDER — VANCOMYCIN 2 GRAM/500 ML IN 0.9 % SODIUM CHLORIDE INTRAVENOUS
2000 ONCE
Status: DISCONTINUED | OUTPATIENT
Start: 2021-09-01 | End: 2021-09-01 | Stop reason: DRUGHIGH

## 2021-09-01 RX ORDER — VANCOMYCIN 1.75 GRAM/500 ML IN 0.9 % SODIUM CHLORIDE INTRAVENOUS
1750 ONCE
Status: COMPLETED | OUTPATIENT
Start: 2021-09-01 | End: 2021-09-02

## 2021-09-01 RX ORDER — ACETAMINOPHEN 650 MG/1
650 SUPPOSITORY RECTAL
Status: DISCONTINUED | OUTPATIENT
Start: 2021-09-01 | End: 2021-09-02

## 2021-09-01 RX ORDER — DICLOFENAC SODIUM 10 MG/G
2-4 GEL TOPICAL
COMMUNITY
End: 2022-05-12

## 2021-09-01 RX ORDER — ROCURONIUM BROMIDE 10 MG/ML
100 INJECTION, SOLUTION INTRAVENOUS
Status: COMPLETED | OUTPATIENT
Start: 2021-09-01 | End: 2021-09-01

## 2021-09-01 RX ORDER — IPRATROPIUM BROMIDE AND ALBUTEROL SULFATE 2.5; .5 MG/3ML; MG/3ML
3 SOLUTION RESPIRATORY (INHALATION)
Status: DISCONTINUED | OUTPATIENT
Start: 2021-09-01 | End: 2021-09-01

## 2021-09-01 RX ORDER — METHIMAZOLE 5 MG/1
2.5 TABLET ORAL DAILY
COMMUNITY

## 2021-09-01 RX ORDER — ETOMIDATE 2 MG/ML
20 INJECTION INTRAVENOUS
Status: COMPLETED | OUTPATIENT
Start: 2021-09-01 | End: 2021-09-01

## 2021-09-01 RX ORDER — PROPOFOL 10 MG/ML
0-50 VIAL (ML) INTRAVENOUS
Status: DISCONTINUED | OUTPATIENT
Start: 2021-09-01 | End: 2021-09-02

## 2021-09-01 RX ORDER — HYDRALAZINE HYDROCHLORIDE 20 MG/ML
20 INJECTION INTRAMUSCULAR; INTRAVENOUS ONCE
Status: COMPLETED | OUTPATIENT
Start: 2021-09-01 | End: 2021-09-01

## 2021-09-01 RX ORDER — NOREPINEPHRINE BITARTRATE/D5W 8 MG/250ML
.5-16 PLASTIC BAG, INJECTION (ML) INTRAVENOUS
Status: DISCONTINUED | OUTPATIENT
Start: 2021-09-01 | End: 2021-09-02

## 2021-09-01 RX ORDER — PROMETHAZINE HYDROCHLORIDE 25 MG/1
25 TABLET ORAL
COMMUNITY
End: 2021-12-03

## 2021-09-01 RX ORDER — METFORMIN HYDROCHLORIDE 500 MG/1
1 TABLET, FILM COATED, EXTENDED RELEASE ORAL 2 TIMES DAILY
COMMUNITY

## 2021-09-01 RX ORDER — DEXAMETHASONE SODIUM PHOSPHATE 4 MG/ML
6 INJECTION, SOLUTION INTRA-ARTICULAR; INTRALESIONAL; INTRAMUSCULAR; INTRAVENOUS; SOFT TISSUE EVERY 24 HOURS
Status: CANCELLED | OUTPATIENT
Start: 2021-09-01

## 2021-09-01 RX ADMIN — HYDRALAZINE HYDROCHLORIDE 20 MG: 20 INJECTION INTRAMUSCULAR; INTRAVENOUS at 19:25

## 2021-09-01 RX ADMIN — NOREPINEPHRINE BITARTRATE 4 MCG/MIN: 1 INJECTION, SOLUTION, CONCENTRATE INTRAVENOUS at 22:42

## 2021-09-01 RX ADMIN — PROPOFOL 10 MCG/KG/MIN: 10 INJECTION, EMULSION INTRAVENOUS at 18:10

## 2021-09-01 RX ADMIN — SODIUM CHLORIDE, POTASSIUM CHLORIDE, SODIUM LACTATE AND CALCIUM CHLORIDE 125 ML/HR: 600; 310; 30; 20 INJECTION, SOLUTION INTRAVENOUS at 23:18

## 2021-09-01 RX ADMIN — SODIUM CHLORIDE 1000 ML: 9 INJECTION, SOLUTION INTRAVENOUS at 21:51

## 2021-09-01 RX ADMIN — IOPAMIDOL 140 ML: 755 INJECTION, SOLUTION INTRAVENOUS at 18:37

## 2021-09-01 RX ADMIN — ROCURONIUM BROMIDE 100 MG: 50 INJECTION, SOLUTION INTRAVENOUS at 17:45

## 2021-09-01 RX ADMIN — HEPARIN SODIUM 3850 UNITS: 5000 INJECTION INTRAVENOUS; SUBCUTANEOUS at 23:28

## 2021-09-01 RX ADMIN — ETOMIDATE 20 MG: 2 INJECTION INTRAVENOUS at 17:44

## 2021-09-01 RX ADMIN — SODIUM CHLORIDE 1000 ML: 9 INJECTION, SOLUTION INTRAVENOUS at 19:12

## 2021-09-01 RX ADMIN — CEFEPIME HYDROCHLORIDE 2 G: 2 INJECTION, POWDER, FOR SOLUTION INTRAVENOUS at 19:26

## 2021-09-01 RX ADMIN — HEPARIN SODIUM 12 UNITS/KG/HR: 10000 INJECTION, SOLUTION INTRAVENOUS at 23:31

## 2021-09-01 RX ADMIN — VANCOMYCIN HYDROCHLORIDE 1750 MG: 10 INJECTION, POWDER, LYOPHILIZED, FOR SOLUTION INTRAVENOUS at 19:35

## 2021-09-01 RX ADMIN — ASPIRIN 300 MG: 300 SUPPOSITORY RECTAL at 23:29

## 2021-09-01 RX ADMIN — PIPERACILLIN AND TAZOBACTAM 3.38 G: 3; .375 INJECTION, POWDER, LYOPHILIZED, FOR SOLUTION INTRAVENOUS at 21:52

## 2021-09-01 NOTE — ROUTINE PROCESS
TRANSFER - OUT REPORT:    Verbal report given to leilani on Dejan Ali  being transferred to 3002   for routine progression of care       Report consisted of patients Situation, Background, Assessment and   Recommendations(SBAR). Information from the following report(s) SBAR, ED Summary, [facility] and Recent Results was reviewed with the receiving nurse. Opportunity for questions and clarification was provided.

## 2021-09-01 NOTE — ED TRIAGE NOTES
Patient presents to ED for c/o unresponsiveness. Per , he came home from work and found her on the floor unresponsive. Per , she was fine when he left for work but unsure of what time that was. Unsure how long patient has been down for.      pmhx of diabetes

## 2021-09-01 NOTE — ED PROVIDER NOTES
Please note that this dictation was completed with RingCaptcha, the computer voice recognition software.  Quite often unanticipated grammatical, syntax, homophones, and other interpretive errors are inadvertently transcribed by the computer software.  Please disregard these errors.  Please excuse any errors that have escaped final proofreading. 50-year-old female past medical history Brad for diabetes (EMS reports blood glucose on scene 139), GI disorder, history of mammogram, hypertension, and history of Pap smear presents the ER by EMS after being found down by the family. Patient states patient was normal when they left for work though were not sure exactly what time that was. We do know that was earlier today. He came home and found the patient lying on the floor altered. Patient was scooped up by EMS transported to the ER;             Past Medical History:   Diagnosis Date    Diabetes ([address]     Gastrointestinal disorder     History of mammography, screening     Hypertension     Pap smear for cervical cancer screening        Past Surgical History:   Procedure Laterality Date    HX ORTHOPAEDIC Right 2012    right knee medial meniscus surgery         Family History:   Problem Relation Age of Onset   [facility] Cancer Mother          at 59 lung cancer    Cancer Brother         half brother    Other Maternal Grandmother         GI hemorrhage    Other Father         Pt does not know her father    Cancer Brother         half brother       Social History     Socioeconomic History    Marital status: LEGALLY      Spouse name: Not on file    Number of children: Not on file    Years of education: Not on file    Highest education level: Not on file   Occupational History    Not on file   Tobacco Use    Smoking status: Current Every Day Smoker     Packs/day: 0.50     Types: Cigarettes    Smokeless tobacco: Never Used   Substance and Sexual Activity    Alcohol use: No     Alcohol/week: 0.0 standard drinks    Drug use: No    Sexual activity: Not on file   Other Topics Concern    Not on file   Social History Narrative    Not on file     Social Determinants of Health     Financial Resource Strain:     Difficulty of Paying Living Expenses:    Food Insecurity:     Worried About Running Out of Food in the Last Year:     [address] in the Last Year:    Transportation Needs:     Lack of Transportation (Medical):  Lack of Transportation (Non-Medical):    Physical Activity:     Days of Exercise per Week:     Minutes of Exercise per Session:    Stress:     Feeling of Stress :    Social Connections:     Frequency of Communication with Friends and Family:     Frequency of Social Gatherings with Friends and Family:     Attends Yarsanism Services:     Active Member of Clubs or Organizations:     Attends Club or Organization Meetings:     Marital Status:    Intimate Partner Violence:     Fear of Current or Ex-Partner:     Emotionally Abused:     Physically Abused:     Sexually Abused: ALLERGIES: Amoxicillin and Penicillin g    Review of Systems   Unable to perform ROS: Intubated   All other systems reviewed and are negative. Vitals:    09/01/21 1737 09/01/21 1753   BP: 95/80    Pulse: 93 98   Resp: 16 16   Temp: (!) 90.5 °F (32.5 °C)    SpO2: 100% 100%   Weight: 64 kg (141 lb 1.5 oz)    Height: 5' 4\" (1.626 m)             Physical Exam  Constitutional:       General: She is in acute distress. Appearance: Normal appearance. She is obese. She is not ill-appearing. Comments: Pt w/ arms flexed/ clenched fists; sonorous respirations; on NRB, sats 100 %; cool to the touch/ wet;     gcs = 6   HENT:      Head: Normocephalic and atraumatic. Comments: Atraumatic    False teeth removed;      Right Ear: External ear normal.      Left Ear: External ear normal.      Nose: Nose normal. No congestion.       Mouth/Throat:      Mouth: Mucous membranes are moist.      Pharynx: No oropharyngeal exudate or posterior oropharyngeal erythema. Eyes:      General: No scleral icterus. Right eye: No discharge. Left eye: No discharge. Conjunctiva/sclera: Conjunctivae normal.      Pupils: Pupils are equal, round, and reactive to light. Comments: 2 mm bilat   Cardiovascular:      Rate and Rhythm: Regular rhythm. Tachycardia present. Pulses: Normal pulses. Heart sounds: Normal heart sounds. No murmur heard. No friction rub. No gallop. Pulmonary:      Effort: Pulmonary effort is normal.      Breath sounds: Normal breath sounds. No wheezing, rhonchi or rales. Chest:      Chest wall: No tenderness. Abdominal:      General: There is no distension. Palpations: Abdomen is soft. Hernia: No hernia is present. Genitourinary:     Comments: NEFG      Musculoskeletal:         General: No deformity or signs of injury. Cervical back: Normal range of motion and neck supple. Comments: Skin intact/ no evidence externally of trauma;    Skin:     Capillary Refill: Capillary refill takes 2 to 3 seconds. Coloration: Skin is not jaundiced. Findings: No bruising, erythema, lesion or rash. Neurological:      Comments: Pt unable to cooperate w/ exam          MDM  Number of Diagnoses or Management Options  Risk of Complications, Morbidity, and/or Mortality  Presenting problems: high  Diagnostic procedures: high  Management options: high  General comments: Total critical care time spent exclusive of procedures:  55 min    Patient Progress  Patient progress: stable         Procedures    Chief Complaint   Patient presents with    Unresponsive       5:59 PM  The patients presenting problems have been discussed, and they are in agreement with the care plan formulated and outlined with them. I have encouraged them to ask questions as they arise throughout their visit.     MEDICATIONS GIVEN:  Medications   enoxaparin (LOVENOX) injection 30 mg (has no administration in time range)   acetaminophen (TYLENOL) tablet 650 mg (has no administration in time range)     Or   acetaminophen (TYLENOL) suppository 650 mg (has no administration in time range)   sodium chloride 0.9 % bolus infusion 1,000 mL (has no administration in time range)   propofol (DIPRIVAN) 10 mg/mL infusion (has no administration in time range)   etomidate (AMIDATE) 2 mg/mL injection 20 mg (20 mg IntraVENous Given 9/1/21 1744)   rocuronium injection 100 mg (100 mg IntraVENous Given 9/1/21 1745)       LABS REVIEWED:  Labs Reviewed   CULTURE, BLOOD, PAIRED   CULTURE, URINE   CBC WITH AUTOMATED DIFF   METABOLIC PANEL, COMPREHENSIVE   SAMPLES BEING HELD   TROPONIN I   CK   DRUG SCREEN, URINE   ETHYL ALCOHOL   AMMONIA   BLOOD GAS, ARTERIAL   TSH 3RD GENERATION   D DIMER   FERRITIN   FIBRINOGEN   LD   MAGNESIUM   LACTIC ACID   PROCALCITONIN   URINALYSIS W/MICROSCOPIC       RADIOLOGY RESULTS:  The following have been ordered and reviewed:  _____________________________________________________________________  _____________________________________________________________________    EKG interpretation:   Rhythm: sinus tachycardia rhythm; and regular . Rate (approx.): 102; Axis: normal; P wave: normal; QRS interval: normal ; ST/T wave: normal; Negative acute significant segmental elevations/ no old study for comparison;     PROCEDURES:        CONSULTATIONS:       PROGRESS NOTES:      DIAGNOSIS:    1. Altered mental status, unspecified altered mental status type    2. Endotracheally intubated        PLAN:  1-altered/ unknown down time/ found in floor; intubated upon arrival;   2 hypothermia - Lorenza hugger      ED COURSE: The patients hospital course has been uncomplicated. 6:08 PM  Called Pt's house, 'Thang royce - I rent a room from her. She was fine when I left for work at [employer]; I came home at 5 and found her like this.  Im kind of the only family she has';        Perfect Serve Consult for Admission  6:13 PM    ED Room Number: AE30/71  Patient Name and age:  Moustapha Mead 69 y.o.  female  Working Diagnosis:   1. Altered mental status, unspecified altered mental status type    2.  Endotracheally intubated        COVID-19 Suspicion:  no  Sepsis present:  hypothermic  Reassessment needed: yes  Code Status:  Full Code  Readmission: no  Isolation Requirements:  no  Recommended Level of Care:  ICU  Department:[facility] ED - [phone]  Other:  Intubated/ altered

## 2021-09-01 NOTE — ED NOTES
414875 - 20mg etomidate given  [id number] - 100mg rocuronium given  1746 -  intubated by Dr. Lilia Kelly

## 2021-09-01 NOTE — PROGRESS NOTES
[address] [address] RX Pharmacy Progress Note: Antimicrobial Stewardship    Consult for antibiotic dosing of vancomycin by Dr. Augie Freitas  Indication: sepsis  Day of Therapy: 1    Plan:  Vancomycin therapy:   Start with loading dose of vancomycin 1750 mg IV (25 mg/kg, max 2.5 gm) x 1 now in the ED  Pharmacy to dose by level d/t apparent DANIA (SCr 1.16; last SCr from 2017 0.62 and 0.46)   Dose calculated to approximate a   Target AUC/YRIS of 400-600  Trough = n/a   Plan:  Random level with AM labs. Daily SCr ordered per protocol. Pharmacy to follow daily and will make changes to dose and/or frequency based on clinical status. Date Dose & Interval Measured (mcg/mL) Extrapolated (mcg/mL)   ? 9/2 ?1750 mg IV x 1  ? ?AUC:    ? ? ? ?   ? ? ? ? Other Antimicrobial  (not dosed by pharmacist)   Cefepime 2 gm IV x 1 in ED   Cultures     pending   Serum Creatinine     Lab Results   Component Value Date/Time    Creatinine 1.16 (H) 09/01/2021 05:47 PM       Creatinine Clearance Estimated Creatinine Clearance: 38.4 mL/min (A) (based on SCr of 1.16 mg/dL (H)). Procalcitonin    Lab Results   Component Value Date/Time    Procalcitonin 0.28 09/01/2021 05:47 PM        Temp   (!) 90.5 °F (32.5 °C)    WBC   Lab Results   Component Value Date/Time    WBC 16.7 (H) 09/01/2021 05:47 PM       For Antifungals, Metronidazole and Nafcillin: Lab Results   Component Value Date/Time    ALT (SGPT) 77 09/01/2021 05:47 PM    AST (SGOT) 105 (H) 09/01/2021 05:47 PM    Alk.  phosphatase 138 (H) 09/01/2021 05:47 PM    Bilirubin, total 0.4 09/01/2021 05:47 PM         Pharmacist: Cuauhtemoc Martinez, YOLID

## 2021-09-02 ENCOUNTER — APPOINTMENT (OUTPATIENT)
Dept: NON INVASIVE DIAGNOSTICS | Age: 71
DRG: 208 | End: 2021-09-02
Attending: HOSPITALIST
Payer: MEDICARE

## 2021-09-02 ENCOUNTER — APPOINTMENT (OUTPATIENT)
Dept: CT IMAGING | Age: 71
DRG: 208 | End: 2021-09-02
Attending: HOSPITALIST
Payer: MEDICARE

## 2021-09-02 PROBLEM — E72.20 HYPERAMMONEMIA (HCC): Status: ACTIVE | Noted: 2021-09-02

## 2021-09-02 PROBLEM — R79.89 ELEVATED LACTIC ACID LEVEL: Status: ACTIVE | Noted: 2021-09-02

## 2021-09-02 PROBLEM — N17.9 AKI (ACUTE KIDNEY INJURY) (HCC): Status: ACTIVE | Noted: 2021-09-02

## 2021-09-02 PROBLEM — R79.89 D-DIMER, ELEVATED: Status: ACTIVE | Noted: 2021-09-02

## 2021-09-02 PROBLEM — R74.8 ELEVATED CK: Status: ACTIVE | Noted: 2021-09-02

## 2021-09-02 PROBLEM — F11.10 OPIATE ABUSE, EPISODIC (HCC): Status: ACTIVE | Noted: 2021-09-02

## 2021-09-02 PROBLEM — J96.01 ACUTE HYPOXEMIC RESPIRATORY FAILURE (HCC): Status: ACTIVE | Noted: 2021-09-02

## 2021-09-02 PROBLEM — F12.10 MILD TETRAHYDROCANNABINOL (THC) ABUSE: Status: ACTIVE | Noted: 2021-09-02

## 2021-09-02 PROBLEM — R65.21 SEPTIC SHOCK (HCC): Status: ACTIVE | Noted: 2021-09-02

## 2021-09-02 PROBLEM — F11.20 OPIATE DEPENDENCE (HCC): Status: ACTIVE | Noted: 2021-09-02

## 2021-09-02 PROBLEM — M62.82 RHABDOMYOLYSIS: Status: ACTIVE | Noted: 2021-09-02

## 2021-09-02 PROBLEM — R79.89 ELEVATED LFTS: Status: ACTIVE | Noted: 2021-09-02

## 2021-09-02 PROBLEM — E87.0 HYPERNATREMIA: Status: ACTIVE | Noted: 2021-09-02

## 2021-09-02 PROBLEM — F14.10 COCAINE ABUSE (HCC): Status: ACTIVE | Noted: 2021-09-02

## 2021-09-02 PROBLEM — D72.819 LEUKOCYTOPENIA: Status: ACTIVE | Noted: 2021-09-02

## 2021-09-02 PROBLEM — A41.9 SEPTIC SHOCK (HCC): Status: ACTIVE | Noted: 2021-09-02

## 2021-09-02 PROBLEM — G93.41 ACUTE METABOLIC ENCEPHALOPATHY: Status: ACTIVE | Noted: 2021-09-02

## 2021-09-02 LAB
ALBUMIN SERPL-MCNC: 3.2 G/DL (ref 3.5–5)
ALBUMIN/GLOB SERPL: 1 {RATIO} (ref 1.1–2.2)
ALP SERPL-CCNC: 84 U/L (ref 45–117)
ALT SERPL-CCNC: 99 U/L (ref 12–78)
ANION GAP SERPL CALC-SCNC: 5 MMOL/L (ref 5–15)
APTT PPP: 116.4 SEC (ref 22.1–31)
APTT PPP: 55.2 SEC (ref 22.1–31)
ARTERIAL PATENCY WRIST A: YES
ARTERIAL PATENCY WRIST A: YES
AST SERPL-CCNC: 174 U/L (ref 15–37)
ATRIAL RATE: 102 BPM
BASE DEFICIT BLDA-SCNC: 3 MMOL/L
BASE DEFICIT BLDA-SCNC: 5.4 MMOL/L
BASOPHILS # BLD: 0 K/UL (ref 0–0.1)
BASOPHILS NFR BLD: 0 % (ref 0–1)
BDY SITE: ABNORMAL
BDY SITE: ABNORMAL
BILIRUB SERPL-MCNC: 0.6 MG/DL (ref 0.2–1)
BUN SERPL-MCNC: 24 MG/DL (ref 6–20)
BUN/CREAT SERPL: 33 (ref 12–20)
CALCIUM SERPL-MCNC: 7.8 MG/DL (ref 8.5–10.1)
CALCULATED P AXIS, ECG09: 69 DEGREES
CALCULATED R AXIS, ECG10: -20 DEGREES
CALCULATED T AXIS, ECG11: 47 DEGREES
CHLORIDE SERPL-SCNC: 117 MMOL/L (ref 97–108)
CHOLEST SERPL-MCNC: 106 MG/DL
CK SERPL-CCNC: 2587 U/L (ref 26–192)
CK SERPL-CCNC: 3069 U/L (ref 26–192)
CO2 SERPL-SCNC: 24 MMOL/L (ref 21–32)
CREAT SERPL-MCNC: 0.73 MG/DL (ref 0.55–1.02)
D DIMER PPP FEU-MCNC: 4.19 MG/L FEU (ref 0–0.65)
DIAGNOSIS, 93000: NORMAL
DIFFERENTIAL METHOD BLD: ABNORMAL
EOSINOPHIL # BLD: 0 K/UL (ref 0–0.4)
EOSINOPHIL NFR BLD: 0 % (ref 0–7)
ERYTHROCYTE [DISTWIDTH] IN BLOOD BY AUTOMATED COUNT: 13.1 % (ref 11.5–14.5)
EST. AVERAGE GLUCOSE BLD GHB EST-MCNC: 126 MG/DL
FIO2 ON VENT: 40 %
FIO2 ON VENT: 40 %
GAS FLOW.O2 SETTING OXYMISER: 20 L/MIN
GLOBULIN SER CALC-MCNC: 3.1 G/DL (ref 2–4)
GLUCOSE BLD STRIP.AUTO-MCNC: 108 MG/DL (ref 65–117)
GLUCOSE BLD STRIP.AUTO-MCNC: 135 MG/DL (ref 65–117)
GLUCOSE BLD STRIP.AUTO-MCNC: 135 MG/DL (ref 65–117)
GLUCOSE BLD STRIP.AUTO-MCNC: 143 MG/DL (ref 65–117)
GLUCOSE BLD STRIP.AUTO-MCNC: 188 MG/DL (ref 65–117)
GLUCOSE SERPL-MCNC: 138 MG/DL (ref 65–100)
HBA1C MFR BLD: 6 % (ref 4–5.6)
HCO3 BLDA-SCNC: 22 MMOL/L (ref 22–26)
HCO3 BLDA-SCNC: 23 MMOL/L (ref 22–26)
HCT VFR BLD AUTO: 43.9 % (ref 35–47)
HDLC SERPL-MCNC: 55 MG/DL
HDLC SERPL: 1.9 {RATIO} (ref 0–5)
HGB BLD-MCNC: 13.9 G/DL (ref 11.5–16)
HIV 1+2 AB+HIV1 P24 AG SERPL QL IA: NONREACTIVE
HIV12 RESULT COMMENT, HHIVC: NORMAL
IMM GRANULOCYTES # BLD AUTO: 0.2 K/UL (ref 0–0.04)
IMM GRANULOCYTES NFR BLD AUTO: 1 % (ref 0–0.5)
LACTATE BLD-SCNC: 3.69 MMOL/L (ref 0.4–2)
LACTATE SERPL-SCNC: 1.1 MMOL/L (ref 0.4–2)
LDLC SERPL CALC-MCNC: 43 MG/DL (ref 0–100)
LYMPHOCYTES # BLD: 1 K/UL (ref 0.8–3.5)
LYMPHOCYTES NFR BLD: 5 % (ref 12–49)
MCH RBC QN AUTO: 30.3 PG (ref 26–34)
MCHC RBC AUTO-ENTMCNC: 31.7 G/DL (ref 30–36.5)
MCV RBC AUTO: 95.6 FL (ref 80–99)
MONOCYTES # BLD: 1.3 K/UL (ref 0–1)
MONOCYTES NFR BLD: 7 % (ref 5–13)
NEUTS SEG # BLD: 17 K/UL (ref 1.8–8)
NEUTS SEG NFR BLD: 87 % (ref 32–75)
NRBC # BLD: 0 K/UL (ref 0–0.01)
NRBC BLD-RTO: 0 PER 100 WBC
P-R INTERVAL, ECG05: 166 MS
PCO2 BLDA: 47 MMHG (ref 35–45)
PCO2 BLDA: 51 MMHG (ref 35–45)
PEEP RESPIRATORY: 5 CM[H2O]
PEEP RESPIRATORY: 8 CM[H2O]
PH BLDA: 7.26 [PH] (ref 7.35–7.45)
PH BLDA: 7.32 [PH] (ref 7.35–7.45)
PLATELET # BLD AUTO: 166 K/UL (ref 150–400)
PO2 BLDA: 122 MMHG (ref 80–100)
PO2 BLDA: 145 MMHG (ref 80–100)
POTASSIUM SERPL-SCNC: 4 MMOL/L (ref 3.5–5.1)
PRESSURE SUPPORT SETTING VENT: 5 CM[H2O]
PROT SERPL-MCNC: 6.3 G/DL (ref 6.4–8.2)
Q-T INTERVAL, ECG07: 398 MS
QRS DURATION, ECG06: 88 MS
QTC CALCULATION (BEZET), ECG08: 518 MS
RBC # BLD AUTO: 4.59 M/UL (ref 3.8–5.2)
SAO2 % BLD: 98 % (ref 92–97)
SAO2 % BLD: 99 % (ref 92–97)
SAO2% DEVICE SAO2% SENSOR NAME: ABNORMAL
SAO2% DEVICE SAO2% SENSOR NAME: ABNORMAL
SARS-COV-2, XPLCVT: NOT DETECTED
SERVICE CMNT-IMP: ABNORMAL
SERVICE CMNT-IMP: NORMAL
SODIUM SERPL-SCNC: 146 MMOL/L (ref 136–145)
SOURCE, COVRS: NORMAL
SPECIMEN SITE: ABNORMAL
SPECIMEN SITE: ABNORMAL
THERAPEUTIC RANGE,PTTT: ABNORMAL SECS (ref 58–77)
THERAPEUTIC RANGE,PTTT: ABNORMAL SECS (ref 58–77)
TRIGL SERPL-MCNC: 40 MG/DL (ref ?–150)
TROPONIN I SERPL-MCNC: 0.26 NG/ML
VANCOMYCIN SERPL-MCNC: 16.4 UG/ML
VENTILATION MODE VENT: ABNORMAL
VENTILATION MODE VENT: ABNORMAL
VENTRICULAR RATE, ECG03: 102 BPM
VLDLC SERPL CALC-MCNC: 8 MG/DL
VT SETTING VENT: 350 ML
WBC # BLD AUTO: 19.5 K/UL (ref 3.6–11)

## 2021-09-02 PROCEDURE — 65660000000 HC RM CCU STEPDOWN

## 2021-09-02 PROCEDURE — 74011000250 HC RX REV CODE- 250: Performed by: INTERNAL MEDICINE

## 2021-09-02 PROCEDURE — 74011000258 HC RX REV CODE- 258: Performed by: HOSPITALIST

## 2021-09-02 PROCEDURE — 74011636637 HC RX REV CODE- 636/637: Performed by: INTERNAL MEDICINE

## 2021-09-02 PROCEDURE — 80202 ASSAY OF VANCOMYCIN: CPT

## 2021-09-02 PROCEDURE — 74011250636 HC RX REV CODE- 250/636: Performed by: EMERGENCY MEDICINE

## 2021-09-02 PROCEDURE — 85379 FIBRIN DEGRADATION QUANT: CPT

## 2021-09-02 PROCEDURE — 87389 HIV-1 AG W/HIV-1&-2 AB AG IA: CPT

## 2021-09-02 PROCEDURE — C9113 INJ PANTOPRAZOLE SODIUM, VIA: HCPCS | Performed by: HOSPITALIST

## 2021-09-02 PROCEDURE — 85730 THROMBOPLASTIN TIME PARTIAL: CPT

## 2021-09-02 PROCEDURE — 84484 ASSAY OF TROPONIN QUANT: CPT

## 2021-09-02 PROCEDURE — 74011000636 HC RX REV CODE- 636

## 2021-09-02 PROCEDURE — 83036 HEMOGLOBIN GLYCOSYLATED A1C: CPT

## 2021-09-02 PROCEDURE — 85025 COMPLETE CBC W/AUTO DIFF WBC: CPT

## 2021-09-02 PROCEDURE — 99223 1ST HOSP IP/OBS HIGH 75: CPT | Performed by: PSYCHIATRY & NEUROLOGY

## 2021-09-02 PROCEDURE — 74011250636 HC RX REV CODE- 250/636: Performed by: INTERNAL MEDICINE

## 2021-09-02 PROCEDURE — 74011000250 HC RX REV CODE- 250: Performed by: HOSPITALIST

## 2021-09-02 PROCEDURE — 95816 EEG AWAKE AND DROWSY: CPT | Performed by: PSYCHIATRY & NEUROLOGY

## 2021-09-02 PROCEDURE — 82803 BLOOD GASES ANY COMBINATION: CPT

## 2021-09-02 PROCEDURE — 82962 GLUCOSE BLOOD TEST: CPT

## 2021-09-02 PROCEDURE — 99222 1ST HOSP IP/OBS MODERATE 55: CPT | Performed by: SPECIALIST

## 2021-09-02 PROCEDURE — 82550 ASSAY OF CK (CPK): CPT

## 2021-09-02 PROCEDURE — 94664 DEMO&/EVAL PT USE INHALER: CPT

## 2021-09-02 PROCEDURE — 74011250636 HC RX REV CODE- 250/636: Performed by: HOSPITALIST

## 2021-09-02 PROCEDURE — 94003 VENT MGMT INPAT SUBQ DAY: CPT

## 2021-09-02 PROCEDURE — 2709999900 HC NON-CHARGEABLE SUPPLY

## 2021-09-02 PROCEDURE — 71275 CT ANGIOGRAPHY CHEST: CPT

## 2021-09-02 PROCEDURE — 36415 COLL VENOUS BLD VENIPUNCTURE: CPT

## 2021-09-02 PROCEDURE — 36600 WITHDRAWAL OF ARTERIAL BLOOD: CPT

## 2021-09-02 PROCEDURE — 83605 ASSAY OF LACTIC ACID: CPT

## 2021-09-02 PROCEDURE — 80053 COMPREHEN METABOLIC PANEL: CPT

## 2021-09-02 PROCEDURE — 94640 AIRWAY INHALATION TREATMENT: CPT

## 2021-09-02 PROCEDURE — 74011250637 HC RX REV CODE- 250/637: Performed by: EMERGENCY MEDICINE

## 2021-09-02 PROCEDURE — 74011000258 HC RX REV CODE- 258: Performed by: INTERNAL MEDICINE

## 2021-09-02 PROCEDURE — 65270000029 HC RM PRIVATE

## 2021-09-02 PROCEDURE — 80061 LIPID PANEL: CPT

## 2021-09-02 RX ORDER — FENTANYL CITRATE 50 UG/ML
50 INJECTION, SOLUTION INTRAMUSCULAR; INTRAVENOUS
Status: DISCONTINUED | OUTPATIENT
Start: 2021-09-02 | End: 2021-09-04

## 2021-09-02 RX ORDER — SODIUM CHLORIDE 450 MG/100ML
100 INJECTION, SOLUTION INTRAVENOUS CONTINUOUS
Status: DISCONTINUED | OUTPATIENT
Start: 2021-09-02 | End: 2021-09-04

## 2021-09-02 RX ORDER — IPRATROPIUM BROMIDE AND ALBUTEROL SULFATE 2.5; .5 MG/3ML; MG/3ML
3 SOLUTION RESPIRATORY (INHALATION)
Status: DISCONTINUED | OUTPATIENT
Start: 2021-09-02 | End: 2021-09-04 | Stop reason: HOSPADM

## 2021-09-02 RX ORDER — PAROXETINE HYDROCHLORIDE 20 MG/1
40 TABLET, FILM COATED ORAL DAILY
Status: DISCONTINUED | OUTPATIENT
Start: 2021-09-03 | End: 2021-09-04 | Stop reason: HOSPADM

## 2021-09-02 RX ORDER — DEXAMETHASONE 6 MG/1
6 TABLET ORAL DAILY
Status: DISCONTINUED | OUTPATIENT
Start: 2021-09-02 | End: 2021-09-04

## 2021-09-02 RX ORDER — DEXTROSE 50 % IN WATER (D50W) INTRAVENOUS SYRINGE
25-50 AS NEEDED
Status: DISCONTINUED | OUTPATIENT
Start: 2021-09-02 | End: 2021-09-04 | Stop reason: HOSPADM

## 2021-09-02 RX ORDER — INSULIN LISPRO 100 [IU]/ML
INJECTION, SOLUTION INTRAVENOUS; SUBCUTANEOUS
Status: DISCONTINUED | OUTPATIENT
Start: 2021-09-02 | End: 2021-09-04 | Stop reason: HOSPADM

## 2021-09-02 RX ORDER — MAGNESIUM SULFATE 100 %
4 CRYSTALS MISCELLANEOUS AS NEEDED
Status: DISCONTINUED | OUTPATIENT
Start: 2021-09-02 | End: 2021-09-04 | Stop reason: HOSPADM

## 2021-09-02 RX ORDER — ENOXAPARIN SODIUM 100 MG/ML
40 INJECTION SUBCUTANEOUS EVERY 24 HOURS
Status: DISCONTINUED | OUTPATIENT
Start: 2021-09-03 | End: 2021-09-04 | Stop reason: HOSPADM

## 2021-09-02 RX ADMIN — PIPERACILLIN AND TAZOBACTAM 3.38 G: 3; .375 INJECTION, POWDER, LYOPHILIZED, FOR SOLUTION INTRAVENOUS at 10:40

## 2021-09-02 RX ADMIN — ACETAMINOPHEN 650 MG: 325 TABLET ORAL at 22:40

## 2021-09-02 RX ADMIN — SODIUM CHLORIDE, POTASSIUM CHLORIDE, SODIUM LACTATE AND CALCIUM CHLORIDE 125 ML/HR: 600; 310; 30; 20 INJECTION, SOLUTION INTRAVENOUS at 07:19

## 2021-09-02 RX ADMIN — NOREPINEPHRINE BITARTRATE 7 MCG/MIN: 1 INJECTION, SOLUTION, CONCENTRATE INTRAVENOUS at 11:16

## 2021-09-02 RX ADMIN — IPRATROPIUM BROMIDE AND ALBUTEROL SULFATE 3 ML: .5; 3 SOLUTION RESPIRATORY (INHALATION) at 07:11

## 2021-09-02 RX ADMIN — SODIUM CHLORIDE 100 ML/HR: 4.5 INJECTION, SOLUTION INTRAVENOUS at 13:44

## 2021-09-02 RX ADMIN — PROPOFOL 30 MCG/KG/MIN: 10 INJECTION, EMULSION INTRAVENOUS at 03:13

## 2021-09-02 RX ADMIN — VANCOMYCIN HYDROCHLORIDE 750 MG: 750 INJECTION, POWDER, LYOPHILIZED, FOR SOLUTION INTRAVENOUS at 10:43

## 2021-09-02 RX ADMIN — PIPERACILLIN AND TAZOBACTAM 3.38 G: 3; .375 INJECTION, POWDER, LYOPHILIZED, FOR SOLUTION INTRAVENOUS at 03:13

## 2021-09-02 RX ADMIN — PANTOPRAZOLE SODIUM 40 MG: 40 INJECTION, POWDER, FOR SOLUTION INTRAVENOUS at 08:25

## 2021-09-02 RX ADMIN — DEXAMETHASONE 6 MG: 6 TABLET ORAL at 13:48

## 2021-09-02 RX ADMIN — IOPAMIDOL 80 ML: 755 INJECTION, SOLUTION INTRAVENOUS at 16:03

## 2021-09-02 RX ADMIN — INSULIN LISPRO 2 UNITS: 100 INJECTION, SOLUTION INTRAVENOUS; SUBCUTANEOUS at 17:18

## 2021-09-02 RX ADMIN — SODIUM CHLORIDE 0.4 MCG/KG/HR: 900 INJECTION, SOLUTION INTRAVENOUS at 10:42

## 2021-09-02 RX ADMIN — IPRATROPIUM BROMIDE AND ALBUTEROL SULFATE 3 ML: .5; 3 SOLUTION RESPIRATORY (INHALATION) at 00:29

## 2021-09-02 NOTE — PROGRESS NOTES
Physician Progress Note      Luis Flores  CSN #:                  [id number]  :                       1950  ADMIT DATE:       2021 5:34 PM  [address] [place] DATE:  RESPONDING  PROVIDER #:        Leyla Purdy MD          QUERY TEXT:    Dear Hospitalist Team,  Pt admitted with Jey Quintero. Pt noted to be unresponsive with AMS on admission with GCS 6. If possible, please document in the progress notes and discharge summary if you are evaluating and / or treating any of the following: The medical record reflects the following:    Risk Factors: 69 Yr F admitted with COVID19; Acute hypoxic respiratory failure    Clinical Indicators: Patient arrived via EMS after family found patient unresponsive with unknown down time. Work up in the ED revealed a Temperature of 90.5, WBC 16.7, lactic 2.4. Patient was positive for COVID19. Radiological imaging without acute process noted. Patient ABG revealed a PH: 7.10 PCO2 80.4 PO2 263 HCO3 25.1 O2SAT 99.7 and was intubated and placed on mechanical ventilation. Toxicology screen came back positive for Cocaine, Opioids and THC. BMP revealed elevated LFT with an ammonia of 37 and elevated renal labs with BUN 28 Creatinine 1.16 GFR 46. H&P states, AMS. Patient in ICU level of care. Treatment: Daily CBC/BMP, CXR, CT Head, CTA Chest, ICU level of care, COVID19, frequent monitoring/vital signs with supplemental oxygen as needed, intubation/mechanical ventilation, ABG's and toxicology labs. Thank you,  Emanuel Marcus RN, [facility], [address] [place] [address]  Options provided:  -- Drug-induced encephalopathy due to cocaine, opioids and THC  -- Hepatic encephalopathy acute  -- Hepatic encephalopathy chronic  -- Metabolic encephalopathy  -- Toxic encephalopathy  -- Toxic metabolic encephalopathy  -- Delirium due to, Please specify cause.   -- Delirium  -- Other - I will add my own diagnosis  -- Disagree - Not applicable / Not valid  -- Disagree - Clinically unable to determine / Unknown  -- Refer to Clinical Documentation Reviewer    PROVIDER RESPONSE TEXT:    This patient has drug-induced encephalopathy due to cocaine, opioids and THC. Query created by: Bev Peña on 9/2/2021 12:52 PM      QUERY TEXT:    Dear Hospitalist Team,  Pt admitted with Jerod Shea. Pt noted to be hypotensive on admission needing ICU level of care and IV vasopressor BP support. If possible, please document in the progress notes and discharge summary if you are evaluating and/or treating any of the following: The medical record reflects the following:    Risk Factors: 69 Yr F admitted with COVID19; Acute hypoxic respiratory failure    Clinical Indicators: Patient arrived via EMS after family found patient unresponsive with unknown down time. Work up in the ED revealed a Temperature of 90.5, WBC 16.7, lactic 2.4. Patient was positive for COVID19. Radiological imaging without acute process noted. Patient with noted positive toxicology screen for cocaine, opioids and THC. Patient with noted hypotension on admission with documented BP of 86/65, 87/64, 74/54, 72/53. Critical care consult was placed and patient was admitted to ICU level of care. A Levophed IVF titratable gtt was initiated with patient needing up to 10 mcg/min. Patient was also provided 2,000 ML NS IVF bolus in the ED. Vasopressors are currently now turned off with stable BP. Treatment: Toxicology screen, Critical care consult, ICU level of care, frequent monitoring/vital signs,  Levophed IVF titratable gtt was initiated with patient needing up to 10 mcg/min. Patient was also provided 2,000 ML NS IVF bolus in the ED.     Thank you,  Kavita Hall RN, [place], [address] [place] [address]  Options provided:  -- Cardiogenic Shock  -- Hypovolemic Shock  -- Hypovolemia without Shock  -- Hypotension without Shock  -- Other - I will add my own diagnosis  -- Disagree - Not applicable / Not valid  -- Disagree - Clinically unable to determine / Unknown  -- Refer to Clinical Documentation Reviewer    PROVIDER RESPONSE TEXT:    This patient has Hypovolemic Shock. Query created by: Trinidad Rizo on 9/2/2021 12:58 PM      QUERY TEXT:    Dear Hospitalist Team,  Pt admitted with [address]. Pt noted to have unknown down time of unresponsiveness when patient found with a continued rising elevated CK of 1,060, 2,587, 3,069 and UA with positive blood. If possible, please document in progress notes and discharge summary if you are evaluating and/or treating any of the following: The medical record reflects the following:    Risk Factors: 69 Yr F admitted with COVID19; Acute hypoxic respiratory failure    Clinical Indicators:Patient arrived via EMS after family found patient unresponsive with unknown down time. Work up in the ED revealed a Temperature of 90.5, WBC 16.7, lactic 2.4. Patient was positive for COVID19. Per the H&P,  this morning he left around 08:30 AM  At that time she was fine. Mr. Tyrell Girard arrived home later around 04:30 or 05:00 pm and found her down. Patient with unknown down time. UA shows Large blood with few RBC. Check CPK to Rule out Rhabdo. BMP reveals continued rising CK lab values of   1,060, 2,587, 3,069 and UA positive with Large blood. Patient received 2,000 ML NS IVF bolus in the ED. Treatment: BMP with CK, ICU level of care, critical care consult, frequent monitoring/vital signs and 2,000 ML NS IVF bolus. Thank you,  Santosh Porter RN, [address]  [phone]      Per [email or web address]  Traumatic rhabdomyolysis cause examples: crush syndrome, prolonged immobilization  Nontraumatic rhabdomyolysis cause examples:  marked exertion, hyperthermia, metabolic myopathy, drugs or toxins, infections, electrolyte disorders.   Options provided:  -- Traumatic rhabdomyolysis  -- Nontraumatic rhabdomyolysis  -- Other - I will add my own diagnosis  -- Disagree - Not applicable / Not valid  -- Disagree - Clinically unable to determine / Unknown  -- Refer to Clinical Documentation Reviewer    PROVIDER RESPONSE TEXT:    This patient has nontraumatic rhabdomyolysis. Query created by:  Von Fields on 9/2/2021 1:06 PM      Electronically signed by:  Papito Avila MD 9/2/2021 2:44 PM

## 2021-09-02 NOTE — CONSULTS
[facility] and [address] at [facility]rology [facility]ics at [facility], [address] [facility] [address]  [phone] Office  [phone] Facsimile           Referring: Dr. Marge Bennett    Chief Complaint   Patient presents with   Elizabeth Barton     74-year-old lady were asked to see regarding unresponsiveness question stroke versus other  74-year-old lady who was found down by her tenet unresponsive. In the emergency department she was found to be hypothermic and apneic. CT CT perfusion and CTA of the head were unremarkable. She was found to be Covid positive. She was intubated and she has hence been intubated and sedated in the ICU. Urine toxicology screen was positive for cocaine opiates and THC. She has been hypotensive requiring Levophed for pressor support. Troponins have been elevated and she has been seen by cardiology and it has been felt to be secondary to demand mismatch. MRI is pending. CK this morning 2600 up from 1060 yesterday. LDL is 43. White count is 19.5. PTT is 116 on a heparin drip. Lactic acid yesterday 2.4 with an elevated D-dimer 9.69. CTA of the chest is pending. MRI of the brain is pending.  Pt unable to provide hx due to condition    Past Medical History:   Diagnosis Date    Diabetes ([address]     Gastrointestinal disorder     History of mammography, screening 2013    Hypertension     Pap smear for cervical cancer screening 2013       Past Surgical History:   Procedure Laterality Date    HX ORTHOPAEDIC Right 2012    right knee medial meniscus surgery       Current Facility-Administered Medications   Medication Dose Route Frequency Provider Last Rate Last Admin    acetaminophen (TYLENOL) tablet 650 mg  650 mg Oral Q6H PRN Maryellen Greene MD        Or    acetaminophen (TYLENOL) suppository 650 mg  650 mg Rectal Q6H PRN MD Nina Barrientos propofol (DIPRIVAN) 10 mg/mL infusion  0-50 mcg/kg/min IntraVENous TITRATE Chirag Greene MD 11.5 mL/hr at 213 30 mcg/kg/min at 21    aspirin (ASA) suppository 300 mg  300 mg Rectal DAILY Kaley Cox MD   300 mg at 21 232    hydrALAZINE (APRESOLINE) 20 mg/mL injection 10 mg  10 mg IntraVENous Q6H PRN Kaley Cox MD        pantoprazole (PROTONIX) 40 mg in 0.9% sodium chloride 10 mL injection  40 mg IntraVENous DAILY Kaley Cox MD   40 mg at 21 08    Vancomycin - Pharmacy to dose by level   Other Rx Dosing/Monitoring Kaley Cox MD        piperacillin-tazobactam (ZOSYN) 3.375 g in 0.9% sodium chloride (MBP/ADV) 100 mL MBP  3.375 g IntraVENous Q8H Kaley Cox MD 25 mL/hr at 21 3.375 g at 21    albuterol-ipratropium (DUO-NEB) 2.5 MG-0.5 MG/3 ML  3 mL Nebulization Q6H RT Kaley Cox MD   3 mL at 21 07    heparin 25,000 units in D5W 250 ml infusion  12-25 Units/kg/hr IntraVENous TITRATE Kaley Cox MD 5.8 mL/hr at 21 9 Units/kg/hr at 21    NOREPINephrine (LEVOPHED) 8 mg in 5% dextrose 250mL (32 mcg/mL) infusion  0.5-16 mcg/min IntraVENous TITRATE Jaycob Haas MD 18.8 mL/hr at 21 0530 10 mcg/min at 21 05    lactated Ringers infusion  125 mL/hr IntraVENous CONTINUOUS Jaycob Haas  mL/hr at 21 0719 125 mL/hr at 21        Allergies   Allergen Reactions    Amoxicillin Nausea and Vomiting    Penicillin G Nausea and Vomiting     Pt can't remember that reaction it caused.        Social History     Tobacco Use    Smoking status: Current Every Day Smoker     Packs/day: 0.50     Types: Cigarettes    Smokeless tobacco: Never Used   Substance Use Topics    Alcohol use: No     Alcohol/week: 0.0 standard drinks    Drug use: No       Family History   Problem Relation Age of Onset    Cancer Mother          at 59 lung cancer    Cancer Brother         half brother   Lexa Gongora Maternal Grandmother         GI hemorrhage    Other Father         Pt does not know her father    Cancer Brother         half brother     Examination  Visit Vitals  BP (!) 104/53   Pulse 79   Temp 98.9 °F (37.2 °C)   Resp 20   Ht 5' 4\" (1.626 m)   Wt 64 kg (141 lb 1.5 oz)   SpO2 98%   BMI 24.22 kg/m²     Secondary to the pandemic and to conserve PPE and limit unnecessary exposure examination is taken from nurse practitioner Tessa Cosme from this morning:  \"Gen:   Intubated/sedated   Neck: Supple   Resp: No accessory muscle use  Card:  SR   MSK:  No cyanosis  Skin:  No rashes    Neuro: per nurse easily arousable   : esposito   Psych:  sedated  LE: No edema\"    Impression/Plan  78-year-old lady found down with hypotension hypoxia and polysubstances in her urine toxicology screen and CT CTA CT perfusion unremarkable  Await MRI when she is stable to get this performed  EEG      Billy Jeffers MD          This note was created using voice recognition software. Despite editing, there may be syntax errors.

## 2021-09-02 NOTE — CONSULTS
Consult Date: 2021    Consults Critical care consult for ICU management    Subjective    69-year-old female with history of diabetes mellitus type 2 hepatitis C, depression, hyperlipidemia hypertension was found down. Patient was intubated in the ED. Tox screen is positive for cocaine and THC. She Tested positive for Covid. Currently she is on vent support at 40% oxygen. She is on heparin drip. She sedated on propofol. She is on Levophed. Lactic acid is down from 2.4 to 1.1 D-Dimer 9.7    Past Medical History:   Diagnosis Date    Diabetes ([address]     Gastrointestinal disorder     History of mammography, screening     Hypertension     Pap smear for cervical cancer screening       Past Surgical History:   Procedure Laterality Date    HX ORTHOPAEDIC Right 2012    right knee medial meniscus surgery     Family History   Problem Relation Age of Onset   Mercedes Duke Cancer Mother          at 59 lung cancer    Cancer Brother         half brother    Other Maternal Grandmother         GI hemorrhage    Other Father         Pt does not know her father    Cancer Brother         half brother      Social History     Tobacco Use    Smoking status: Current Every Day Smoker     Packs/day: 0.50     Types: Cigarettes    Smokeless tobacco: Never Used   Substance Use Topics    Alcohol use: No     Alcohol/week: 0.0 standard drinks       Current Facility-Administered Medications   Medication Dose Route Frequency Provider Last Rate Last Admin    vancomycin (VANCOCIN) 750 mg in 0.9% sodium chloride 250 mL (VIAL-MATE)  750 mg IntraVENous Q12H Edita Foy MD        [START ON 9/3/2021] Vancomycin- Level at 4am/AM labs on 9/3/21   Other ONCE Edita Foy MD        acetaminophen (TYLENOL) tablet 650 mg  650 mg Oral Q6H PRN Armaan Greene MD        Or    acetaminophen (TYLENOL) suppository 650 mg  650 mg Rectal Q6H PRN Armaan Greene MD        propofol (DIPRIVAN) 10 mg/mL infusion  0-50 mcg/kg/min IntraVENous TITRATE Juwan Greene MD 7.7 mL/hr at 09/02/21 0945 20 mcg/kg/min at 09/02/21 0945    aspirin (ASA) suppository 300 mg  300 mg Rectal DAILY J Carlos Ortega MD   300 mg at 09/01/21 2329    hydrALAZINE (APRESOLINE) 20 mg/mL injection 10 mg  10 mg IntraVENous Q6H PRN J Carlos Ortega MD        pantoprazole (PROTONIX) 40 mg in 0.9% sodium chloride 10 mL injection  40 mg IntraVENous DAILY J Carlos Ortega MD   40 mg at 09/02/21 0825    piperacillin-tazobactam (ZOSYN) 3.375 g in 0.9% sodium chloride (MBP/ADV) 100 mL MBP  3.375 g IntraVENous Q8H J Carlos Ortega MD 25 mL/hr at 09/02/21 0313 3.375 g at 09/02/21 0313    albuterol-ipratropium (DUO-NEB) 2.5 MG-0.5 MG/3 ML  3 mL Nebulization Q6H RT J Carlos Ortega MD   3 mL at 09/02/21 0711    heparin 25,000 units in D5W 250 ml infusion  12-25 Units/kg/hr IntraVENous TITRATE J Carlos Ortega MD 5.8 mL/hr at 09/02/21 0719 9 Units/kg/hr at 09/02/21 0719    NOREPINephrine (LEVOPHED) 8 mg in 5% dextrose 250mL (32 mcg/mL) infusion  0.5-16 mcg/min IntraVENous TITRATE Nikhil Austin MD 16.9 mL/hr at 09/02/21 0946 9 mcg/min at 09/02/21 0946    lactated Ringers infusion  125 mL/hr IntraVENous CONTINUOUS Nikhil Austin  mL/hr at 09/02/21 0719 125 mL/hr at 09/02/21 0719        Review of Systems  Unable to obtain given clinical condition  Objective     Vital signs for last 24 hours:  Visit Vitals  /65   Pulse 80   Temp 98.9 °F (37.2 °C)   Resp 20   Ht 5' 4\" (1.626 m)   Wt 64 kg (141 lb 1.5 oz)   SpO2 98%   BMI 24.22 kg/m²       Intake/Output this shift:  Current Shift: No intake/output data recorded.   Last 3 Shifts: 08/31 1901 - 09/02 0700  In: 2808.1 [I.V.:2808.1]  Out: 2225 [Urine:2225]    Data Review:   Recent Results (from the past 24 hour(s))   CBC WITH AUTOMATED DIFF    Collection Time: 09/01/21  5:47 PM   Result Value Ref Range    WBC 16.7 (H) 3.6 - 11.0 K/uL    RBC 5.31 (H) 3.80 - 5.20 M/uL    HGB 16.1 (H) 11.5 - 16.0 g/dL    HCT 51.2 (H) 35.0 - 47.0 %    MCV 96.4 80.0 - 99.0 FL    MCH 30.3 26.0 - 34.0 PG    MCHC 31.4 30.0 - 36.5 g/dL    RDW 13.0 11.5 - 14.5 %    PLATELET 872 734 - 214 K/uL    MPV 13.0 (H) 8.9 - 12.9 FL    NRBC 0.0 0  WBC    ABSOLUTE NRBC 0.00 0.00 - 0.01 K/uL    NEUTROPHILS 75 32 - 75 %    LYMPHOCYTES 14 12 - 49 %    MONOCYTES 9 5 - 13 %    EOSINOPHILS 0 0 - 7 %    BASOPHILS 1 0 - 1 %    IMMATURE GRANULOCYTES 1 (H) 0.0 - 0.5 %    ABS. NEUTROPHILS 12.5 (H) 1.8 - 8.0 K/UL    ABS. LYMPHOCYTES 2.3 0.8 - 3.5 K/UL    ABS. MONOCYTES 1.5 (H) 0.0 - 1.0 K/UL    ABS. EOSINOPHILS 0.1 0.0 - 0.4 K/UL    ABS. BASOPHILS 0.1 0.0 - 0.1 K/UL    ABS. IMM. GRANS. 0.2 (H) 0.00 - 0.04 K/UL    DF AUTOMATED     METABOLIC PANEL, COMPREHENSIVE    Collection Time: 09/01/21  5:47 PM   Result Value Ref Range    Sodium 143 136 - 145 mmol/L    Potassium 5.0 3.5 - 5.1 mmol/L    Chloride 109 (H) 97 - 108 mmol/L    CO2 25 21 - 32 mmol/L    Anion gap 9 5 - 15 mmol/L    Glucose 223 (H) 65 - 100 mg/dL    BUN 28 (H) 6 - 20 MG/DL    Creatinine 1.16 (H) 0.55 - 1.02 MG/DL    BUN/Creatinine ratio 24 (H) 12 - 20      GFR est AA 56 (L) >60 ml/min/1.73m2    GFR est non-AA 46 (L) >60 ml/min/1.73m2    Calcium 8.6 8.5 - 10.1 MG/DL    Bilirubin, total 0.4 0.2 - 1.0 MG/DL    ALT (SGPT) 77 12 - 78 U/L    AST (SGOT) 105 (H) 15 - 37 U/L    Alk.  phosphatase 138 (H) 45 - 117 U/L    Protein, total 8.3 (H) 6.4 - 8.2 g/dL    Albumin 4.0 3.5 - 5.0 g/dL    Globulin 4.3 (H) 2.0 - 4.0 g/dL    A-G Ratio 0.9 (L) 1.1 - 2.2     TROPONIN I    Collection Time: 09/01/21  5:47 PM   Result Value Ref Range    Troponin-I, Qt. 0.16 (H) <0.05 ng/mL   CK    Collection Time: 09/01/21  5:47 PM   Result Value Ref Range    CK 1,060 (H) 26 - 192 U/L   ETHYL ALCOHOL    Collection Time: 09/01/21  5:47 PM   Result Value Ref Range    ALCOHOL(ETHYL),SERUM <10 <10 MG/DL   FERRITIN    Collection Time: 09/01/21  5:47 PM   Result Value Ref Range    Ferritin 940 (H) 26 - 388 NG/ML   LD    Collection Time: 09/01/21  5:47 PM   Result Value Ref Range    LD 369 (H) 81 - 246 U/L   MAGNESIUM    Collection Time: 09/01/21  5:47 PM   Result Value Ref Range    Magnesium 2.5 (H) 1.6 - 2.4 mg/dL   PROCALCITONIN    Collection Time: 09/01/21  5:47 PM   Result Value Ref Range    Procalcitonin 0.28 ng/mL   TSH 3RD GENERATION    Collection Time: 09/01/21  5:47 PM   Result Value Ref Range    TSH 2.75 0.36 - 3.74 uIU/mL   ACETAMINOPHEN    Collection Time: 09/01/21  5:47 PM   Result Value Ref Range    Acetaminophen level <2 (L) 10 - 30 ug/mL   SALICYLATE    Collection Time: 09/01/21  5:47 PM   Result Value Ref Range    Salicylate level 2.6 (L) 2.8 - 20.0 MG/DL   LIPASE    Collection Time: 09/01/21  5:47 PM   Result Value Ref Range    Lipase 155 73 - 393 U/L   NT-PRO BNP    Collection Time: 09/01/21  5:47 PM   Result Value Ref Range    NT pro- (H) <125 PG/ML   POC G3 - PUL    Collection Time: 09/01/21  6:08 PM   Result Value Ref Range    FIO2 (POC) 60 %    pH (POC) 7.10 (LL) 7.35 - 7.45      pCO2 (POC) 80.4 (H) 35.0 - 45.0 MMHG    pO2 (POC) 263 (H) 80 - 100 MMHG    HCO3 (POC) 25.1 22 - 26 MMOL/L    sO2 (POC) 99.7 (H) 92 - 97 %    Base deficit (POC) 7.2 mmol/L    Site LEFT RADIAL      Device: ADULT VENT      Mode ASSIST CONTROL      Tidal volume 350 ml    Set Rate 16 bpm    PEEP/CPAP (POC) 8 cmH2O    Allens test (POC) Positive      Specimen type (POC) ARTERIAL      Critical value read back Lonnell Meigs MD    DRUG SCREEN, URINE    Collection Time: 09/01/21  7:01 PM   Result Value Ref Range    AMPHETAMINES Negative NEG      BARBITURATES Negative NEG      BENZODIAZEPINES Negative NEG      COCAINE Positive (A) NEG      METHADONE Negative NEG      OPIATES Positive (A) NEG      PCP(PHENCYCLIDINE) Negative NEG      THC (TH-CANNABINOL) Positive (A) NEG      Drug screen comment (NOTE)    AMMONIA    Collection Time: 09/01/21  7:01 PM   Result Value Ref Range    Ammonia 37 (H) <32 UMOL/L   D DIMER    Collection Time: 09/01/21  7:01 PM   Result Value Ref Range    D-dimer 9.69 (H) 0.00 - 0.65 mg/L FEU   FIBRINOGEN    Collection Time: 09/01/21  7:01 PM   Result Value Ref Range    Fibrinogen 293 200 - 475 mg/dL   URINALYSIS W/MICROSCOPIC    Collection Time: 09/01/21  7:01 PM   Result Value Ref Range    Color YELLOW/STRAW      Appearance CLOUDY (A) CLEAR      Specific gravity 1.017 1.003 - 1.030      pH (UA) 5.5 5.0 - 8.0      Protein 30 (A) NEG mg/dL    Glucose 100 (A) NEG mg/dL    Ketone Negative NEG mg/dL    Bilirubin Negative NEG      Blood LARGE (A) NEG      Urobilinogen 0.2 0.2 - 1.0 EU/dL    Nitrites Negative NEG      Leukocyte Esterase Negative NEG      WBC 0-4 0 - 4 /hpf    RBC 5-10 0 - 5 /hpf    Epithelial cells FEW FEW /lpf    Bacteria Negative NEG /hpf    Hyaline cast 10-20 0 - 5 /lpf    Granular cast 2-5 (A) NEG /lpf   COVID-19 RAPID TEST    Collection Time: 09/01/21  7:01 PM   Result Value Ref Range    Specimen source Nasopharyngeal      COVID-19 rapid test Detected (AA) NOTD     SARS-COV-2    Collection Time: 09/01/21  7:01 PM   Result Value Ref Range    SARS-CoV-2 Nasopharyngeal     BLOOD GAS, ARTERIAL    Collection Time: 09/01/21  7:40 PM   Result Value Ref Range    pH 7.25 (L) 7.35 - 7.45      PCO2 42 35 - 45 mmHg    PO2 123 (H) 80 - 100 mmHg    O2 SAT 98 (H) 92 - 97 %    BICARBONATE 18 (L) 22 - 26 mmol/L    BASE DEFICIT 8.9 mmol/L    O2 METHOD VENT      FIO2 40 %    MODE ASSIST CONTROL      Tidal volume 350.0      SET RATE 20      PEEP/CPAP 8.0      Sample source ARTERIAL      SITE RIGHT RADIAL      CINDY'S TEST YES      Critical value read back Called to GURWINDER Mccoy RN.  on 09/01/2021 at 19:44    LACTIC ACID    Collection Time: 09/01/21  9:59 PM   Result Value Ref Range    Lactic acid 2.4 (HH) 0.4 - 2.0 MMOL/L   PTT    Collection Time: 09/01/21  9:59 PM   Result Value Ref Range    aPTT 26.1 22.1 - 31.0 sec    aPTT, therapeutic range     58.0 - 77.0 SECS   GLUCOSE, POC    Collection Time: 09/02/21  1:34 AM   Result Value Ref Range    Glucose (POC) 135 (H) 65 - 117 mg/dL    Performed by Ana Guerra    D DIMER    Collection Time: 09/02/21  3:46 AM   Result Value Ref Range    D-dimer 4.19 (H) 0.00 - 0.65 mg/L FEU   METABOLIC PANEL, COMPREHENSIVE    Collection Time: 09/02/21  3:46 AM   Result Value Ref Range    Sodium 146 (H) 136 - 145 mmol/L    Potassium 4.0 3.5 - 5.1 mmol/L    Chloride 117 (H) 97 - 108 mmol/L    CO2 24 21 - 32 mmol/L    Anion gap 5 5 - 15 mmol/L    Glucose 138 (H) 65 - 100 mg/dL    BUN 24 (H) 6 - 20 MG/DL    Creatinine 0.73 0.55 - 1.02 MG/DL    BUN/Creatinine ratio 33 (H) 12 - 20      GFR est AA >60 >60 ml/min/1.73m2    GFR est non-AA >60 >60 ml/min/1.73m2    Calcium 7.8 (L) 8.5 - 10.1 MG/DL    Bilirubin, total 0.6 0.2 - 1.0 MG/DL    ALT (SGPT) 99 (H) 12 - 78 U/L    AST (SGOT) 174 (H) 15 - 37 U/L    Alk.  phosphatase 84 45 - 117 U/L    Protein, total 6.3 (L) 6.4 - 8.2 g/dL    Albumin 3.2 (L) 3.5 - 5.0 g/dL    Globulin 3.1 2.0 - 4.0 g/dL    A-G Ratio 1.0 (L) 1.1 - 2.2     LACTIC ACID    Collection Time: 09/02/21  3:46 AM   Result Value Ref Range    Lactic acid 1.1 0.4 - 2.0 MMOL/L   VANCOMYCIN, RANDOM    Collection Time: 09/02/21  3:46 AM   Result Value Ref Range    Vancomycin, random 16.4 UG/ML   CK    Collection Time: 09/02/21  3:46 AM   Result Value Ref Range    CK 2,587 (H) 26 - 192 U/L   PTT    Collection Time: 09/02/21  3:46 AM   Result Value Ref Range    aPTT 116.4 (HH) 22.1 - 31.0 sec    aPTT, therapeutic range     58.0 - 77.0 SECS   CBC WITH AUTOMATED DIFF    Collection Time: 09/02/21  3:46 AM   Result Value Ref Range    WBC 19.5 (H) 3.6 - 11.0 K/uL    RBC 4.59 3.80 - 5.20 M/uL    HGB 13.9 11.5 - 16.0 g/dL    HCT 43.9 35.0 - 47.0 %    MCV 95.6 80.0 - 99.0 FL    MCH 30.3 26.0 - 34.0 PG    MCHC 31.7 30.0 - 36.5 g/dL    RDW 13.1 11.5 - 14.5 %    PLATELET 577 512 - 705 K/uL    NRBC 0.0 0  WBC    ABSOLUTE NRBC 0.00 0.00 - 0.01 K/uL    NEUTROPHILS 87 (H) 32 - 75 %    LYMPHOCYTES 5 (L) 12 - 49 %    MONOCYTES 7 5 - 13 %    EOSINOPHILS 0 0 - 7 %    BASOPHILS 0 0 - 1 % IMMATURE GRANULOCYTES 1 (H) 0.0 - 0.5 %    ABS. NEUTROPHILS 17.0 (H) 1.8 - 8.0 K/UL    ABS. LYMPHOCYTES 1.0 0.8 - 3.5 K/UL    ABS. MONOCYTES 1.3 (H) 0.0 - 1.0 K/UL    ABS. EOSINOPHILS 0.0 0.0 - 0.4 K/UL    ABS. BASOPHILS 0.0 0.0 - 0.1 K/UL    ABS. IMM. GRANS. 0.2 (H) 0.00 - 0.04 K/UL    DF AUTOMATED     LIPID PANEL    Collection Time: 09/02/21  3:46 AM   Result Value Ref Range    Cholesterol, total 106 <200 MG/DL    Triglyceride 40 <150 MG/DL    HDL Cholesterol 55 MG/DL    LDL, calculated 43 0 - 100 MG/DL    VLDL, calculated 8 MG/DL    CHOL/HDL Ratio 1.9 0.0 - 5.0     BLOOD GAS, ARTERIAL    Collection Time: 09/02/21  4:44 AM   Result Value Ref Range    pH 7.26 (L) 7.35 - 7.45      PCO2 51 (H) 35 - 45 mmHg    PO2 122 (H) 80 - 100 mmHg    O2 SAT 98 (H) 92 - 97 %    BICARBONATE 22 22 - 26 mmol/L    BASE DEFICIT 5.4 mmol/L    O2 METHOD VENT      FIO2 40 %    MODE ASSIST CONTROL      Tidal volume 350.0      SET RATE 20      PEEP/CPAP 8.0      Sample source ARTERIAL      SITE RIGHT RADIAL      CINDY'S TEST YES     GLUCOSE, POC    Collection Time: 09/02/21  6:24 AM   Result Value Ref Range    Glucose (POC) 135 (H) 65 - 117 mg/dL    Performed by Alina Vigil    PTT    Collection Time: 09/02/21  6:30 AM   Result Value Ref Range    aPTT 55.2 (H) 22.1 - 31.0 sec    aPTT, therapeutic range     58.0 - 77.0 SECS       Physical Exam  I examined the patient via telemedicine, with its associated limitations. I beamed in and examined the patient with assistance of house staff     On exam:    Gen: Sedated  HEENT:  Intubated   Chest: symmetrical chest rise  CV:S1,S2  Abd: soft, non-distended  Ext: no edema  Neuro:Sedated    Assessment and plan  74-year-old female with history of hypertension hyperlipidemia diabetes and hepatitis C is admitted with acute encephalopathy and setting of drug overdose. Patient is intubated on pressors. Troponin is elevated. .     Plan  Wean propofol as tolerated to follow neuro exam.  Add fentanyl as needed. Neurology consulted. Plan for MRI of the brain. Wean Levophed as tolerated to keep map above 65. Continue hydration. Cardiology has been consulted. Echo. Vent support. O2 and PEEP to keep sats above 92%. Start pressure support ventilation. Further plan regarding extubation based on clinical course. Follow ABG and chest x-ray. Monitor urine output and renal indicis. N.p.o. for now. On heparin drip, continue. Follow APTT. Monitor H&H and platelet closely. Continue Zosyn. Follow cultures. Monitor WBCs and temperature curve. I/O. Glycemic control    Protonix/heparin drip. Prognosis is guarded    Discussed with bedside RN     I reviewed the electronic medical record, the x-rays, labs, progress notes, previous history and physicals and consultation notes that were available in the electronic medical record. I performed all aspects of the physical examination via Telemedicine associated with two way audio and video communication and with the on-site assistance of  the bedside nurse. I am located in [facility]  and the patient is located in [place] at [facility].    The patient is critically ill in the ICU. I  personally  reviewed the pertinent medical records, laboratory/ pathology data and radiographic images. The decision making regarding this patient is as documented above, which was generated  following  discussion  with the multidisciplinary ICU team and creation of a treatment plan for  the patient. We discussed the patient's interval history and future coordination of care and  plans. The patient's medications  were reviewed and changes made as stipulated above. Due to  critical illness impairing one or more vital organs of this patient resulting in life threatening clinical situation  I have provided direct, frequent personal  assessment and manipulation in management plan and spent 50 minutes  of  critical care time excluding the time spent on procedures and teaching.   Greater than 50% of this time  in patients care was  employed  in counseling and coordination of care and engaged in face to face discussion of case management issues, addressing questions, and outlining a plan of  therapy. Pt at risk of life threatening deterioration from acute resp failure    Pt seen and evaluated via tele encounter. Audio and Video were used for this interaction. ATTENTION:  This medical record was transcribed using an electronic medical records/speech recognition system. Although proofread, it may and can contain electronic, spelling and other errors. Corrections may be executed at a later time. Please feel free to contact us for any clarifications as needed.

## 2021-09-02 NOTE — PROGRESS NOTES
Reason for Admission:  Covid +,polysubstance abuse,acute respiratory failure with severe hypoxia and hypercarbia,hyptension,found unresponsive @ home with unknown down time                      RUR Score:      13%               Plan for utilizing home health: To be determined      PCP: First and Last name:  Katja Lucio, DO     Name of Practice:    Are you a current patient: Yes/No: ?? Approximate date of last visit:    Can you participate in a virtual visit with your PCP: ?? Current Advanced Directive/Advance Care Plan: full code?? Healthcare Decision Maker:            I talked with Orquidea Webb who is a friend and tenant. He informed me pt 's  left her 15 years ago and has never returned phone calls. He informed me that pt.'s son  a few years ago. He informed me that pt has no other family to make decisions for her. I explained to him regarding the law in the [facility] that unless you are legally ,the  is considered the decision-maker . Now that pt has been extubated,I will request for a  pastoral care consultation to see pt for an AMD.                    Transition of Care Plan:                        I explained to pt.'s  friend and tenant,Pearl Tong that no information can be given to him unless he is designated by pt in for us to discuss information. Mr Tyler Tong had a friend Ms. Gudino call me requesting information and I explained to her also that no information can be given to her until pt designates who she wants her decision-makers to be. Pt lives with Mr Tyler Tong who rents a room from pt. Today:  Pt was extubated today. Pt is on 6 liters oxygen now. UDS + cocaine,THC ,opiates  Cardiology is consulted.     Problems:  Covid +  Pneumonia  Acute metabolic encephalopathy  Acute respiratory failure with hypoxia  DANIA  rhabdomyolysis    Past Medical history:  DMT2,Hepatits C,OA,hypercholesteremia,osteoporosis    I will continue to follow pt for discharge needs. PCP is Dr Fiona Hyde.     Wyatt Walters

## 2021-09-02 NOTE — PROGRESS NOTES
[facility]    Medical Progress Note      NAME: Cristhian Bolton   :  1950  MRM:  [id number]    Date/Time of service 2021  12:58 PM          Assessment and Plan:     Acute metabolic encephalopathy / Altered mental status / Anxiety and depression / Mild tetrahydrocannabinol (THC) abuse / Cocaine abuse / Opiate abuse, episodic - POA, better. Multifactorial, DDx cocaine, THC, opiates, hypoxia, ammonia, dementia, other. Resume paxil.  Aware does not show any opiate Rx. Neurology consulted and plan MRI, EEG    Acute hypoxemic respiratory failure - POA, unclear etiology. Suspect central suppression, and covid. Extubated this AM.  Continue oxygen as needed, wean. Currently 6L    Shock / Hx HTN (hypertension) - POA, unclear type. Most likely hypovolemic, vs covid septic, vs cardiogenic vs drugs vs other. Now better. Triturating off phenylephrine. I will hold lisinopril for now. Cardiology consulted and checking ECHO. Pneumonia due to COVID 19 / Leukocytopenia - Positive rapid test, second test negative. Xray normal.  But given her illness and hypoxia, assume infection and start decadron, pulmonary to consider remdesivir and actemra in setting of other acute illness. With negative xray will hold off other Abx for now, awaiting final blood cx. DANIA (acute kidney injury) / Hypernatremia / Elevated lactic acid level - POA, presumed due to IVVD. Better after hydration. Monitor. Diabetes mellitus type 2, controlled - Diabetic diet and counseling. SSI per protocol. Hold home metformin. Check A1c.     Hepatitis C, chronic - Saw Dr. Nba Sandhu and stopped treatment     Hyperthyroid - She takes methimazole, but TSH normal.  Hold med      Primary osteoarthritis of both knees - Tylenol prn    Pure hypercholesterolemia - Check panel, hold crestor    Osteoporosis - Not on meds    D-dimer, elevated - POA, likely due to trauma etc, but getting CT chest to rule out PE, LE doppler to rule out DVT    Elevated LFTs / Hyperammonemia - POA due to shock liver, on top of chronic hepatitis. Monitor. Rhabdomyolysis / Elevated CK - POA, mild, due to being found down. Monitor. Hydrate       Subjective:     Chief Complaint:  Weak, vague    ROS:  (bold if positive, if negative)    Tolerating minimal PT NPO        Objective:     Last 24hrs VS reviewed since prior progress note.  Most recent are:    Visit Vitals  BP (!) 141/80   Pulse 79   Temp 99.2 °F (37.3 °C)   Resp 14   Ht 5' 4\" (1.626 m)   Wt 64 kg (141 lb 1.5 oz)   SpO2 98%   BMI 24.22 kg/m²     SpO2 Readings from Last 6 Encounters:   09/02/21 98%   08/20/21 98%   11/01/17 96%   06/01/17 93%   01/24/15 97%   01/27/14 97%    O2 Flow Rate (L/min): 6 l/min       Intake/Output Summary (Last 24 hours) at 9/2/2021 1258  Last data filed at 9/2/2021 1200  Gross per 24 hour   Intake 2808.14 ml   Output 3125 ml   Net -316.86 ml        Physical Exam:    Gen:  Frail, ill appearing, in no acute distress  HEENT:  Pink conjunctivae, PERRL, hearing intact to voice, moist mucous membranes  Neck:  Supple, without masses, thyroid non-tender  Resp:  No accessory muscle use, clear breath sounds without wheezes rales or rhonchi  Card:  No murmurs, normal S1, S2 without thrills, bruits or peripheral edema  Abd:  Soft, non-tender, non-distended, normoactive bowel sounds are present, no mass  Lymph:  No cervical or inguinal adenopathy  Musc:  No cyanosis or clubbing  Skin:  No rashes or ulcers, skin turgor is reduced  Neuro:  Cranial nerves are grossly intact, general motor weakness, follows commands vaguely  Psych:  Poor insight, oriented to person    Telemetry reviewed:   normal sinus rhythm  __________________________________________________________________  Medications Reviewed: (see below)  Medications:     Current Facility-Administered Medications   Medication Dose Route Frequency    fentaNYL citrate (PF) injection 50 mcg  50 mcg IntraVENous Q1H PRN    albuterol-ipratropium (DUO-NEB) 2.5 MG-0.5 MG/3 ML  3 mL Nebulization Q6H PRN    glucose chewable tablet 16 g  4 Tablet Oral PRN    dextrose (D50W) injection syrg 12.5-25 g  25-50 mL IntraVENous PRN    glucagon (GLUCAGEN) injection 1 mg  1 mg IntraMUSCular PRN    insulin lispro (HUMALOG) injection   SubCUTAneous AC&HS    acetaminophen (TYLENOL) tablet 650 mg  650 mg Oral Q6H PRN    hydrALAZINE (APRESOLINE) 20 mg/mL injection 10 mg  10 mg IntraVENous Q6H PRN        Lab Data Reviewed: (see below)  Lab Review:     Recent Labs     09/02/21  0346 09/01/21  1747   WBC 19.5* 16.7*   HGB 13.9 16.1*   HCT 43.9 51.2*    216     Recent Labs     09/02/21  0346 09/01/21  1747   * 143   K 4.0 5.0   * 109*   CO2 24 25   * 223*   BUN 24* 28*   CREA 0.73 1.16*   CA 7.8* 8.6   MG  --  2.5*   ALB 3.2* 4.0   TBILI 0.6 0.4   ALT 99* 77     Lab Results   Component Value Date/Time    Glucose (POC) 143 (H) 09/02/2021 11:52 AM    Glucose (POC) 135 (H) 09/02/2021 06:24 AM    Glucose (POC) 135 (H) 09/02/2021 01:34 AM    Glucose  07/23/2018 02:22 PM    Glucose  NF 11/01/2017 11:08 AM    Glucose  non fasting 06/01/2017 02:07 PM    Glucose POC 99 Fasting 02/22/2017 10:46 AM    Glucose POC fasting 127 01/25/2017 09:51 AM     Recent Labs     09/02/21  1108 09/02/21  0444 09/01/21  1940   PH 7.32* 7.26* 7.25*   PCO2 47* 51* 42   PO2 145* 122* 123*   HCO3 23 22 18*   FIO2 40 40 40     No results for input(s): INR, INREXT in the last 72 hours.   All Micro Results     Procedure Component Value Units Date/Time    CULTURE, URINE [[id number]] Collected: 09/01/21 1901    Order Status: Completed Specimen: Cath Urine Updated: 09/01/21 2154    CULTURE, BLOOD, PAIRED [[id number]] Collected: 09/01/21 1901    Order Status: Completed Specimen: Blood Updated: 09/1950    CULTURE, BLOOD [[id number]]     Order Status: Sent Specimen: Blood     COVID-19 RAPID TEST [[id number]]  (Abnormal) Collected: 09/01/21 1901 Order Status: Completed Specimen: Nasopharyngeal Updated: 09/01/21 1939     Specimen source Nasopharyngeal        COVID-19 rapid test Detected        Comment: Rapid Abbott ID Now       The specimen is POSITIVE for SARS-CoV-2, the novel coronavirus associated with COVID-19. This test has been authorized by the FDA under an Emergency Use Authorization (EUA) for use by authorized laboratories. Fact sheet for Healthcare Providers: [employer]  Fact sheet for Patients: [employer]       Methodology: Isothermal Nucleic Acid Amplification  CALLED TO AND READ BACK BY  MANUELA MICHAEL AT 1937/JS         CULTURE, BLOOD, PAIRED [[id number]]     Order Status: Sent Specimen: Blood           Other pertinent lab: none    Total time spent with patient: 30 Minutes I personally reviewed chart, notes, data and current medications in the medical record. I have personally examined and treated the patient at bedside during this period.                  Care Plan discussed with: Patient, Care Manager, Nursing Staff, Consultant/Specialist and >50% of time spent in counseling and coordination of care    Discussed:  Care Plan and D/C Planning    Prophylaxis:  Lovenox and H2B/PPI    Disposition:  Home w/Family           ___________________________________________________    Attending Physician: Asha Maravilla MD

## 2021-09-02 NOTE — PROGRESS NOTES
[facility]: MED RECONCILIATION    Information obtained from: Medication list obtained through review of profile with [facility] pharmacist ([phone])    Allergies: Amoxicillin and Penicillin g    Prior to Admission Medications:     Medication Documentation Review Audit       Reviewed by Helen Guzman PHARMD (Pharmacist) on 09/01/21 at 2133      Medication Sig Documenting Provider Last Dose Status Taking?   diclofenac (VOLTAREN) 1 % gel Apply 2-4 g to affected area four (4) times daily as needed for Pain. Provider, Historical  Active Yes   lisinopriL (PRINIVIL, ZESTRIL) 2.5 mg tablet Take 2.5 mg by mouth daily. Provider, Historical  Active Yes   metFORMIN (GLUMETZA ER) 500 mg TG24 24 hour tablet Take 1 Tablet by mouth two (2) times a day. Provider, Historical  Active Yes   methIMAzole (TAPAZOLE) 5 mg tablet Take 2.5 mg by mouth daily. Provider, Historical  Active Yes   PARoxetine (PAXIL) 40 mg tablet TAKE 1 TABLET BY MOUTH EVERY DAY Powers, Gilmer Gaucher, MD  Active Yes   potassium chloride (K-DUR, KLOR-CON) 20 mEq tablet Take 20 mEq by mouth daily. Provider, Historical  Active Yes   promethazine (PHENERGAN) 25 mg tablet Take 25 mg by mouth every six (6) hours as needed for Nausea. Provider, Historical  Active Yes   rosuvastatin (CRESTOR) 5 mg tablet Take 5 mg by mouth daily.  Provider, Historical  Active Yes                  Bridgett Galvez, [place]   Contact: [phone]

## 2021-09-02 NOTE — PROGRESS NOTES
Cardiology Initial Care Encounter    Patient: Jignesh Bañuelos MRN: [id number]     YOB: 1950  Age: 69 y.o. Sex: female      Admit Date: 2021       Assessment/Plan     1. Elevated troponin: likely supply demand mismatch in setting of hypothermia, hypoxia, hypotension. Trend troponins,  cont heparin (d dimer9 ), ECHO.   2 COVID +:   3 acute resp failure with hypoxia: intubated in ED. 4 hypotension requiring pressor support: on Levophed.   5 HTN  6 T 2DM:   7 HLD:   8. Chronic Hep C:   9. cocaine/thc/opiate +:            HPI     Jignesh Bañuelos is a 69 y.o.   female  with PMH of T 2 DM,  depression, hepatitis C, hyperlipidemia, and hypertension who was brought into the emergency room by EMS as she was found down by her torie. EMS was called in. Her blood sugar was 139. Temp 90. In the emergency room, the patient was urgently intubated for respiratory protection she was apneic per ER Physician. COVID +, UDS + for cocaine, opiates and THC. D yariel on admission 9.69  Troponin 0.16    The patient has been referred to cardiology for on going management of elevated troponin       The patient denies chest pain, dependent edema, diaphoresis, JESSICA, orthopnea, palpitations, PND, shortness of breath, claudication or syncope. No bleeding. Review of Symptoms: unable 2/2 intubation   Constitutional:   ENT: negative   Respiratory:   Gastrointestinal:   Genitourinary:   Musculoskeletal:  Neurological:         Previous cardiac hx  No specialty comments available.      Risk factors:   Smoker  Post menopausal female     Social History     Tobacco Use    Smoking status: Current Every Day Smoker     Packs/day: 0.50     Types: Cigarettes    Smokeless tobacco: Never Used   Substance Use Topics    Alcohol use: No     Alcohol/week: 0.0 standard drinks     Family History   Problem Relation Age of Onset    Cancer Mother          at 59 lung cancer    Cancer Brother         half brother  Other Maternal Grandmother         GI hemorrhage    Other Father         Pt does not know her father    Cancer Brother         half brother       Current Facility-Administered Medications   Medication Dose Route Frequency    acetaminophen (TYLENOL) tablet 650 mg  650 mg Oral Q6H PRN    Or    acetaminophen (TYLENOL) suppository 650 mg  650 mg Rectal Q6H PRN    propofol (DIPRIVAN) 10 mg/mL infusion  0-50 mcg/kg/min IntraVENous TITRATE    aspirin (ASA) suppository 300 mg  300 mg Rectal DAILY    hydrALAZINE (APRESOLINE) 20 mg/mL injection 10 mg  10 mg IntraVENous Q6H PRN    pantoprazole (PROTONIX) 40 mg in 0.9% sodium chloride 10 mL injection  40 mg IntraVENous DAILY    Vancomycin - Pharmacy to dose by level   Other Rx Dosing/Monitoring    piperacillin-tazobactam (ZOSYN) 3.375 g in 0.9% sodium chloride (MBP/ADV) 100 mL MBP  3.375 g IntraVENous Q8H    albuterol-ipratropium (DUO-NEB) 2.5 MG-0.5 MG/3 ML  3 mL Nebulization Q6H RT    heparin 25,000 units in D5W 250 ml infusion  12-25 Units/kg/hr IntraVENous TITRATE    NOREPINephrine (LEVOPHED) 8 mg in 5% dextrose 250mL (32 mcg/mL) infusion  0.5-16 mcg/min IntraVENous TITRATE    lactated Ringers infusion  125 mL/hr IntraVENous CONTINUOUS       Objective:     Vitals:    09/02/21 0700 09/02/21 0705 09/02/21 0711 09/02/21 0715   BP: (!) 111/57   (!) 104/53   Pulse: 79 79 80 79   Resp: 20  20 20   Temp:       SpO2: 98%  98% 98%   Weight:       Height:            Intake and Output:  Current Shift: No intake/output data recorded. Last three shifts: 08/31 1901 - 09/02 0700  In: 2808.1 [I.V.:2808.1]  Out: 2215 [Urine:2225]        Examined through ICU window  Gen:  Intubated/sedated   Neck: Supple   Resp: No accessory muscle use  Card: SR   MSK: No cyanosis  Skin: No rashes    Neuro: per nurse easily arousable   : esposito   Psych:  sedated  LE: No edema    EKG:  Sinus tachycardia    TELEMETRY: SR     Lab/Data Review:  BMP:   Lab Results   Component Value Date/Time  (H) 09/02/2021 03:46 AM    K 4.0 09/02/2021 03:46 AM     (H) 09/02/2021 03:46 AM    CO2 24 09/02/2021 03:46 AM    AGAP 5 09/02/2021 03:46 AM     (H) 09/02/2021 03:46 AM    BUN 24 (H) 09/02/2021 03:46 AM    CREA 0.73 09/02/2021 03:46 AM    GFRAA >60 09/02/2021 03:46 AM    GFRNA >60 09/02/2021 03:46 AM     CBC:   Lab Results   Component Value Date/Time    WBC 19.5 (H) 09/02/2021 03:46 AM    HGB 13.9 09/02/2021 03:46 AM    HCT 43.9 09/02/2021 03:46 AM     09/02/2021 03:46 AM     All Cardiac Markers in the last 24 hours:   Lab Results   Component Value Date/Time    CPK 2,587 (H) 09/02/2021 03:46 AM    CPK 1,060 (H) 09/01/2021 05:47 PM    TROIQ 0.16 (H) 09/01/2021 05:47 PM     COAGS:   Lab Results   Component Value Date/Time    APTT 55.2 (H) 09/02/2021 06:30 AM        Signed By: Keeley Lopez NP     September 2, 2021

## 2021-09-02 NOTE — PROGRESS NOTES
TRANSFER - IN REPORT:    Verbal report received from Say Gabriel on Germán Pascal  being received from ED for routine progression of care      Report consisted of patients Situation, Background, Assessment and   Recommendations(SBAR). 2130- Assessment, two nurse skin assessment, mouth care completed. 2220-Notified Dr. Gauri Donald of patient's low blood pressures, after L bolus was given. Levophed ordered and started. 2330-Heparin started at the initial rate 12u/kg/hr per order. 0000-Patient turned and repositioned. 0200-Patient suctioned, mouth care done, turned and repositioned. 0500-Critical .4, Heparin stopped and physician notified, Dr. Gauri Donald. Monitor Ptt per protocol. 0630-Redrew ptt, blood sugar and reposition and turned.

## 2021-09-02 NOTE — CONSULTS
Patient is intubated in the ICU and currently being managed by the [address] [place] [address] team.  We will sign off for now. Please feel free to re-consult pulmonary service if needed once patient downgraded from ICU status.

## 2021-09-02 NOTE — PROGRESS NOTES
[facility] visit. Mrs. Tomás Gill was asleep. She is Denominational. She is unable to receive communion due to medical restrictions. Prayer for spiritual communion offered outside her room.     Gaetano Gaucher, SBS, RN, ACSW, LCSW   Page:  [phone]

## 2021-09-02 NOTE — PROGRESS NOTES
0700-  Bedside shift change report given to Treva Pelaez RN (oncoming nurse) by Deng Grant RN (offgoing nurse). Report included the following information SBAR, Kardex, Intake/Output, MAR, Recent Results, Med Rec Status, Cardiac Rhythm NSR, Alarm Parameters  and Quality Measures. Primary Nurse Bowen Avitia RN and Deng Grant RN, RN performed a dual skin assessment on this patient No impairment noted  Enrrique score is 11       0945- SBT for one hour as tolerated per Dr Marlene Anaya  1110-ABG drawn 1hr post SBT and sent to Dr Marlene Anaya. OK to \"extubate if cuff leak\". RT confirmed leak. 1115-Prop stopped. Precedex increased to 0.5. Patient extubated. Now on 6L NC. Restraints remain in place for safety. 1230- weaned off on all gtts. 1/2 NS ordered. Patient more alert and following commands. Passed STAND. Bed alarm on. Patient instructed to call for assist.   1600-Patient off unit for CTA. 1620-Patient back on unit. Decreased O2 4L NC. Weaning as tolerated. 1745-Completed MRI checklist with patient. 1815-On room air. Stepdown order. 1900- Bedside shift change report given to [address] [place] [address] (oncoming nurse) by Treva Pelaez RN (offgoing nurse). Report included the following information SBAR, Kardex, Intake/Output, MAR, Recent Results, Med Rec Status, Cardiac Rhythm NSR, Alarm Parameters  and Quality Measures.

## 2021-09-02 NOTE — PROGRESS NOTES
1900- Bedside and Verbal shift change report given to Luís Alexandra RN(oncoming nurse) by Sandra Cohen RN (offgoing nurse). Report included the following information SBAR, Kardex, Intake/Output, MAR, Recent Results, Cardiac Rhythm NSR and Alarm Parameters . Primary Nurse Luís Alexandra RN and Sandra Cohen RN performed a dual skin assessment on this patient No impairment noted  Enrrique score is 15    2000- Pt is resting in bed at this time on room air. No complaints at this time. Doan care provided. 0000- No changes on reassessment. 0400- No changes on reassessment. Doan care provided. 0700- Bedside and Verbal shift change report given to Rob Ruiz (oncoming nurse) by Luís Alexandra RN (offgoing nurse). Report included the following information SBAR, Kardex, Intake/Output, MAR, Recent Results, Cardiac Rhythm NSR and Alarm Parameters .

## 2021-09-02 NOTE — PROGRESS NOTES
[address] [address] [facility] Pharmacy Progress Note: Antimicrobial Stewardship    Consult for antibiotic dosing of vancomycin by Dr. Liz Rosenberg  Indication: sepsis  Day of Therapy: 2    Plan:  Vancomycin therapy:  Start with loading dose of vancomycin 1750 mg IV (25 mg/kg, max 2.5 gm)  Pharmacy to dose by level d/t apparent DANIA (SCr 1.16; last SCr from 2017 0.62 and 0.46)   Dose calculated to approximate a   Target AUC/YRIS of 400-600  Trough = n/a   Plan:  Improved SCr and will start 750mg every 12 hrs and check a level 9/3/21  Pharmacy to follow daily and will make changes to dose and/or frequency based on clinical status. Date Dose & Interval Measured (mcg/mL) Extrapolated (mcg/mL)   ? 9/2 ?1750 mg IV x 1  ?16.8 ? AUC: 502   ? ? ? ?   ? ? ? ? Other Antimicrobial  (not dosed by pharmacist)   Zosyn 3.375gm IV every 8 hrs   Cultures     9/1: Blood- pending  9/1: Urine- pending   Serum Creatinine     Lab Results   Component Value Date/Time    Creatinine 0.73 09/02/2021 03:46 AM       Creatinine Clearance Estimated Creatinine Clearance: 61 mL/min (based on SCr of 0.73 mg/dL). Procalcitonin    Lab Results   Component Value Date/Time    Procalcitonin 0.28 09/01/2021 05:47 PM        Temp   98.9 °F (37.2 °C)    WBC   Lab Results   Component Value Date/Time    WBC 19.5 (H) 09/02/2021 03:46 AM       For Antifungals, Metronidazole and Nafcillin: Lab Results   Component Value Date/Time    ALT (SGPT) 99 (H) 09/02/2021 03:46 AM    AST (SGOT) 174 (H) 09/02/2021 03:46 AM    Alk. phosphatase 84 09/02/2021 03:46 AM    Bilirubin, total 0.6 09/02/2021 03:46 AM         Pharmacist: Cuauhtemoc Guerrero

## 2021-09-02 NOTE — H&P
[facility] [place] [facility]  HISTORY AND PHYSICAL    Name:  Pari Hannon  MR#:  [id number]  :  1950  ACCOUNT #:  [de-identified]  ADMIT DATE:  2021      PRIMARY CARE PHYSICIAN:  Carlos Hannon MD    PRESENTING COMPLAINT:  Found down. HISTORY OF PRESENT ILLNESS:      The patient is a 65-year-old  female with previous history significant for type 2 diabetes, depression, hepatitis C, hyperlipidemia, and hypertension who was brought into the emergency room by EMS as she was found down by her torie. I spoke with Rocco oHward, a friend and tenant, who is the only member listed, who tells me that this morning he left around 08:30 AM  At that time she was fine. Mr. Kodi Lepe arrived home later around 04:30 or 05:00 pm and found her down. EMS was called in. Her blood sugar was 139. In the emergency room, the patient was urgently intubated for respiratory protection she was apneac per ER Physician. Unable to get any meaningful history. The patient's power of  says that she has not complained of any symptoms recently no chest pain or SOB. She had been seen by Dr. Cole Dalton for chronic hepatitis C. Covid vaccination status not clear. PAST MEDICAL HISTORY:   1. Hepatitis C.  2.  Depression. 3.  Type 2 diabetes. 4.  Hyperlipidemia. 5.  Hypertension. ALLERGIES:      KNOWN ALLERGIES INCLUDE AMOXICILLIN AND PENICILLIN. CURRENT MEDICATIONS:      Prior to admission are not clear; however, she was supposed to be on metformin 500 mg daily, Prinizide 10/12.5 daily, Paxil 40 daily, and Flexeril. SOCIOECONOMIC HISTORY:      The patient does smoke weed. She does smoke cigarettes. She drinks socially. She lives alone, but has a person who is renting from her. Code status is not clear. REVIEW OF SYSTEMS:  Cannot be done because of the patient's condition. PHYSICAL EXAMINATION:    GENERAL:  The patient is a 65-year-old female, who is intubated.   VITAL SIGNS:  Reveal initial temperature of 90.5, blood pressure 95/80, saturation 100% on current vent setting. HEENT:  Reveals pupils equally reacting to light and accommodation. NECK:  Supple. There is no lymphadenopathy or JVD. CHEST:  Clear. No significant wheezing or crackles. CARDIOVASCULAR SYSTEM:  S1 and S2 regular. No murmur. No S3.  ABDOMEN:  Reveals no significant tenderness. No guarding, no rigidity. EXTREMITIES:  No pedal edema. NEUROLOGIC:  CNS examination reveals the patient is on vent, unable to do a detailed examination. Plantars are equivocal.    LABORATORY DATA:  Lab work revealed a white count of 16.7, hemoglobin of 16.1, hematocrit is 51.2, platelet count is 181,751. Sodium 143, potassium is 5, chloride is 109, bicarb is 25, gap of 9, glucose 223, BUN is 28, creatinine is 1.16, calcium is 8.6. Bilirubin 0.4, protein is 8.3, albumin is 4, globulin is 4.3. ALT 77, , alk phos 138. CPK is 1060. Troponin I is 0.16. TSH is 2.75. Acetaminophen level is less than 2, salicylate level is 2.6. CTA of the head and neck with contrast reveals no intracranial enhancement or substantial stenosis demonstrated. CT head showed no perfusion mismatch. Chest x-ray showed no acute finding. CTA chest ordered pending cant be done tonight due to CTA head was just done    ASSESSMENT AND PLAN:      The patient is a 67-year-old female with previous history significant for hypertension, diabetes, chronic hepatitis C, depression; admitted with:    1. Altered mental status, exact etiology is not clear. The patient was found to be hypotensive initially but has been Hypertensive since she is Intubated. She also has elevated white count. We will start empirically on wide spectrum antibiotic and obtain cultures. PRN Hydralazine ordered. If no stroke on MRI can control BP more aggressively. Neurology consulted. 2. Hypothermia: Rewarming protocol startedl.   The patient will be actively rewarmed. Her temperature was 90 at presentation. Check Serial Lactate. 3.  ABG done at the time of intubation showed CO2 retention. Her pH at that time reveled 7.1, pCO2 of 80, so she may have chronic obstructive pulmonary disease. We will give breathing treatments. No history of COPD. 4.  Acute respiratory failure: Patient is COVID positive with Severe hypoxia : Rapid Covid test is positive she has elevated D Dimer . Start on Heparin drip till CTA chest is completed (probably tomorrow as she just had CTA head and neck done in ER an hour ago), Decadron and Remdesivir (Pharmacy consulted for Remdesivir). We will repeat ABG and consult intensivist. Check CRP. May qualify for Actemra. 5.  Consult Neurology, cannot rule out stroke. We will order an MRI. Rectal ASA started. 6.  Obtain an echocardiogram.  The patient has slightly elevated troponin of 0.16. EKG shows prolonged QT but no ischemic changes consult Cardiology. Obtain UDS not sure if she overdosed . 7. Chronic hepatitis C, probably causing elevated LFTs. 8.UA shows Large blood with few RBC. Check CPK to Rule out Rhabdo. 9.  Deep vein thrombosis prophylaxis on Heparin drip. 10. DANIA : Check CPK monitor urine out put.       MD MEGAN Loya/[email or web address]/  D:  09/01/2021 19:15  T:  09/01/2021 20:17  JOB #:  [id number]

## 2021-09-02 NOTE — PROGRESS NOTES
Problem: Ventilator Management  Goal: *Adequate oxygenation and ventilation  Outcome: Progressing Towards Goal  Goal: *Patient maintains clear airway/free of aspiration  Outcome: Progressing Towards Goal  Goal: *Absence of infection signs and symptoms  Outcome: Progressing Towards Goal  Goal: *Normal spontaneous ventilation  Outcome: Progressing Towards Goal     Problem: Patient Education: Go to Patient Education Activity  Goal: Patient/Family Education  Outcome: Progressing Towards Goal     Problem: Airway Clearance - Ineffective  Goal: Achieve or maintain patent airway  Outcome: Progressing Towards Goal     Problem: Gas Exchange - Impaired  Goal: Absence of hypoxia  Outcome: Progressing Towards Goal  Goal: Promote optimal lung function  Outcome: Progressing Towards Goal     Problem: Non-Violent Restraints  Goal: Removal from restraints as soon as assessed to be safe  Outcome: Progressing Towards Goal  Goal: No harm/injury to patient while restraints in use  Outcome: Progressing Towards Goal  Goal: Patient's dignity will be maintained  Outcome: Progressing Towards Goal  Goal: Patient Interventions  Outcome: Progressing Towards Goal

## 2021-09-03 ENCOUNTER — APPOINTMENT (OUTPATIENT)
Dept: MRI IMAGING | Age: 71
DRG: 208 | End: 2021-09-03
Attending: HOSPITALIST
Payer: MEDICARE

## 2021-09-03 ENCOUNTER — APPOINTMENT (OUTPATIENT)
Dept: NON INVASIVE DIAGNOSTICS | Age: 71
DRG: 208 | End: 2021-09-03
Attending: HOSPITALIST
Payer: MEDICARE

## 2021-09-03 ENCOUNTER — APPOINTMENT (OUTPATIENT)
Dept: VASCULAR SURGERY | Age: 71
DRG: 208 | End: 2021-09-03
Attending: PHYSICIAN ASSISTANT
Payer: MEDICARE

## 2021-09-03 LAB
ALBUMIN SERPL-MCNC: 2.9 G/DL (ref 3.5–5)
ALBUMIN/GLOB SERPL: 1 {RATIO} (ref 1.1–2.2)
ALP SERPL-CCNC: 65 U/L (ref 45–117)
ALT SERPL-CCNC: 112 U/L (ref 12–78)
ANION GAP SERPL CALC-SCNC: 3 MMOL/L (ref 5–15)
AST SERPL-CCNC: 204 U/L (ref 15–37)
B PERT DNA SPEC QL NAA+PROBE: NOT DETECTED
BACTERIA SPEC CULT: NORMAL
BILIRUB SERPL-MCNC: 0.7 MG/DL (ref 0.2–1)
BORDETELLA PARAPERTUSSIS PCR, BORPAR: NOT DETECTED
BUN SERPL-MCNC: 12 MG/DL (ref 6–20)
BUN/CREAT SERPL: 31 (ref 12–20)
C PNEUM DNA SPEC QL NAA+PROBE: NOT DETECTED
CALCIUM SERPL-MCNC: 8.5 MG/DL (ref 8.5–10.1)
CHLORIDE SERPL-SCNC: 111 MMOL/L (ref 97–108)
CHOLEST SERPL-MCNC: 102 MG/DL
CK SERPL-CCNC: 5616 U/L (ref 26–192)
CO2 SERPL-SCNC: 28 MMOL/L (ref 21–32)
CREAT SERPL-MCNC: 0.39 MG/DL (ref 0.55–1.02)
CRP SERPL-MCNC: 3.22 MG/DL (ref 0–0.6)
ECHO AO ASC DIAM: 2.68 CM
ECHO AO ROOT DIAM: 3.16 CM
ECHO AV AREA PEAK VELOCITY: 1.48 CM2
ECHO AV AREA/BSA PEAK VELOCITY: 0.9 CM2/M2
ECHO AV PEAK GRADIENT: 4.47 MMHG
ECHO AV PEAK VELOCITY: 105.7 CM/S
ECHO IVC PROX: 2.35 CM
ECHO LA MAJOR AXIS: 2.96 CM
ECHO LA MINOR AXIS: 1.75 CM
ECHO LV INTERNAL DIMENSION DIASTOLIC: 4.01 CM (ref 3.9–5.3)
ECHO LV INTERNAL DIMENSION SYSTOLIC: 3.23 CM
ECHO LV IVSD: 0.73 CM (ref 0.6–0.9)
ECHO LV MASS 2D: 82.4 G (ref 67–162)
ECHO LV MASS INDEX 2D: 48.7 G/M2 (ref 43–95)
ECHO LV POSTERIOR WALL DIASTOLIC: 0.72 CM (ref 0.6–0.9)
ECHO LVOT DIAM: 1.97 CM
ECHO LVOT PEAK GRADIENT: 1.06 MMHG
ECHO LVOT PEAK VELOCITY: 51.39 CM/S
ECHO PV MAX VELOCITY: 81.14 CM/S
ECHO PV PEAK INSTANTANEOUS GRADIENT SYSTOLIC: 2.63 MMHG
ECHO RV INTERNAL DIMENSION: 3.66 CM
ECHO TV REGURGITANT MAX VELOCITY: 228.22 CM/S
ECHO TV REGURGITANT PEAK GRADIENT: 20.83 MMHG
ERYTHROCYTE [DISTWIDTH] IN BLOOD BY AUTOMATED COUNT: 12.6 % (ref 11.5–14.5)
FLUAV H1 2009 PAND RNA SPEC QL NAA+PROBE: NOT DETECTED
FLUAV H1 RNA SPEC QL NAA+PROBE: NOT DETECTED
FLUAV H3 RNA SPEC QL NAA+PROBE: NOT DETECTED
FLUAV SUBTYP SPEC NAA+PROBE: NOT DETECTED
FLUBV RNA SPEC QL NAA+PROBE: NOT DETECTED
GLOBULIN SER CALC-MCNC: 2.9 G/DL (ref 2–4)
GLUCOSE BLD STRIP.AUTO-MCNC: 158 MG/DL (ref 65–117)
GLUCOSE BLD STRIP.AUTO-MCNC: 160 MG/DL (ref 65–117)
GLUCOSE BLD STRIP.AUTO-MCNC: 183 MG/DL (ref 65–117)
GLUCOSE BLD STRIP.AUTO-MCNC: 216 MG/DL (ref 65–117)
GLUCOSE SERPL-MCNC: 125 MG/DL (ref 65–100)
HADV DNA SPEC QL NAA+PROBE: NOT DETECTED
HCOV 229E RNA SPEC QL NAA+PROBE: NOT DETECTED
HCOV HKU1 RNA SPEC QL NAA+PROBE: NOT DETECTED
HCOV NL63 RNA SPEC QL NAA+PROBE: NOT DETECTED
HCOV OC43 RNA SPEC QL NAA+PROBE: NOT DETECTED
HCT VFR BLD AUTO: 36.1 % (ref 35–47)
HDLC SERPL-MCNC: 47 MG/DL
HDLC SERPL: 2.2 {RATIO} (ref 0–5)
HGB BLD-MCNC: 12.3 G/DL (ref 11.5–16)
HMPV RNA SPEC QL NAA+PROBE: NOT DETECTED
HPIV1 RNA SPEC QL NAA+PROBE: NOT DETECTED
HPIV2 RNA SPEC QL NAA+PROBE: NOT DETECTED
HPIV3 RNA SPEC QL NAA+PROBE: NOT DETECTED
HPIV4 RNA SPEC QL NAA+PROBE: NOT DETECTED
LDLC SERPL CALC-MCNC: 43.8 MG/DL (ref 0–100)
M PNEUMO DNA SPEC QL NAA+PROBE: NOT DETECTED
MAGNESIUM SERPL-MCNC: 1.9 MG/DL (ref 1.6–2.4)
MCH RBC QN AUTO: 31 PG (ref 26–34)
MCHC RBC AUTO-ENTMCNC: 34.1 G/DL (ref 30–36.5)
MCV RBC AUTO: 90.9 FL (ref 80–99)
NRBC # BLD: 0 K/UL (ref 0–0.01)
NRBC BLD-RTO: 0 PER 100 WBC
PHOSPHATE SERPL-MCNC: 1.8 MG/DL (ref 2.6–4.7)
PLATELET # BLD AUTO: 124 K/UL (ref 150–400)
POTASSIUM SERPL-SCNC: 3.7 MMOL/L (ref 3.5–5.1)
PROCALCITONIN SERPL-MCNC: 2.08 NG/ML
PROT SERPL-MCNC: 5.8 G/DL (ref 6.4–8.2)
RBC # BLD AUTO: 3.97 M/UL (ref 3.8–5.2)
RSV RNA SPEC QL NAA+PROBE: NOT DETECTED
RV+EV RNA SPEC QL NAA+PROBE: NOT DETECTED
SARS-COV-2 PCR, COVPCR: NOT DETECTED
SERVICE CMNT-IMP: ABNORMAL
SERVICE CMNT-IMP: NORMAL
SODIUM SERPL-SCNC: 142 MMOL/L (ref 136–145)
TRIGL SERPL-MCNC: 56 MG/DL (ref ?–150)
TROPONIN I SERPL-MCNC: 0.11 NG/ML
VLDLC SERPL CALC-MCNC: 11.2 MG/DL
WBC # BLD AUTO: 11.2 K/UL (ref 3.6–11)

## 2021-09-03 PROCEDURE — 70551 MRI BRAIN STEM W/O DYE: CPT

## 2021-09-03 PROCEDURE — 80061 LIPID PANEL: CPT

## 2021-09-03 PROCEDURE — 86140 C-REACTIVE PROTEIN: CPT

## 2021-09-03 PROCEDURE — 95816 EEG AWAKE AND DROWSY: CPT | Performed by: PSYCHIATRY & NEUROLOGY

## 2021-09-03 PROCEDURE — 93306 TTE W/DOPPLER COMPLETE: CPT | Performed by: SPECIALIST

## 2021-09-03 PROCEDURE — 84145 PROCALCITONIN (PCT): CPT

## 2021-09-03 PROCEDURE — 82550 ASSAY OF CK (CPK): CPT

## 2021-09-03 PROCEDURE — 82962 GLUCOSE BLOOD TEST: CPT

## 2021-09-03 PROCEDURE — 99233 SBSQ HOSP IP/OBS HIGH 50: CPT | Performed by: PSYCHIATRY & NEUROLOGY

## 2021-09-03 PROCEDURE — 74011636637 HC RX REV CODE- 636/637: Performed by: INTERNAL MEDICINE

## 2021-09-03 PROCEDURE — 65270000029 HC RM PRIVATE

## 2021-09-03 PROCEDURE — 36415 COLL VENOUS BLD VENIPUNCTURE: CPT

## 2021-09-03 PROCEDURE — 97530 THERAPEUTIC ACTIVITIES: CPT

## 2021-09-03 PROCEDURE — 97162 PT EVAL MOD COMPLEX 30 MIN: CPT

## 2021-09-03 PROCEDURE — 84484 ASSAY OF TROPONIN QUANT: CPT

## 2021-09-03 PROCEDURE — 84100 ASSAY OF PHOSPHORUS: CPT

## 2021-09-03 PROCEDURE — 97116 GAIT TRAINING THERAPY: CPT

## 2021-09-03 PROCEDURE — 0202U NFCT DS 22 TRGT SARS-COV-2: CPT

## 2021-09-03 PROCEDURE — 74011250636 HC RX REV CODE- 250/636: Performed by: INTERNAL MEDICINE

## 2021-09-03 PROCEDURE — 80053 COMPREHEN METABOLIC PANEL: CPT

## 2021-09-03 PROCEDURE — 93306 TTE W/DOPPLER COMPLETE: CPT

## 2021-09-03 PROCEDURE — 83735 ASSAY OF MAGNESIUM: CPT

## 2021-09-03 PROCEDURE — 93970 EXTREMITY STUDY: CPT

## 2021-09-03 PROCEDURE — 85027 COMPLETE CBC AUTOMATED: CPT

## 2021-09-03 PROCEDURE — 2709999900 HC NON-CHARGEABLE SUPPLY

## 2021-09-03 PROCEDURE — 74011000250 HC RX REV CODE- 250: Performed by: INTERNAL MEDICINE

## 2021-09-03 PROCEDURE — 74011250637 HC RX REV CODE- 250/637: Performed by: INTERNAL MEDICINE

## 2021-09-03 RX ADMIN — INSULIN LISPRO 2 UNITS: 100 INJECTION, SOLUTION INTRAVENOUS; SUBCUTANEOUS at 12:30

## 2021-09-03 RX ADMIN — INSULIN LISPRO 2 UNITS: 100 INJECTION, SOLUTION INTRAVENOUS; SUBCUTANEOUS at 09:50

## 2021-09-03 RX ADMIN — ENOXAPARIN SODIUM 40 MG: 40 INJECTION SUBCUTANEOUS at 09:51

## 2021-09-03 RX ADMIN — DEXAMETHASONE 6 MG: 6 TABLET ORAL at 09:51

## 2021-09-03 RX ADMIN — SODIUM CHLORIDE 100 ML/HR: 4.5 INJECTION, SOLUTION INTRAVENOUS at 10:42

## 2021-09-03 RX ADMIN — INSULIN LISPRO 2 UNITS: 100 INJECTION, SOLUTION INTRAVENOUS; SUBCUTANEOUS at 18:05

## 2021-09-03 RX ADMIN — SODIUM CHLORIDE 100 ML/HR: 4.5 INJECTION, SOLUTION INTRAVENOUS at 00:18

## 2021-09-03 RX ADMIN — INSULIN LISPRO 2 UNITS: 100 INJECTION, SOLUTION INTRAVENOUS; SUBCUTANEOUS at 22:54

## 2021-09-03 RX ADMIN — PAROXETINE HYDROCHLORIDE 40 MG: 20 TABLET, FILM COATED ORAL at 09:51

## 2021-09-03 NOTE — PROGRESS NOTES
Spiritual Care Assessment/Progress Note  [facility]      NAME: Lior Moran      MRN: [id number]  AGE: 69 y.o. SEX: female  Jehovah's witness Affiliation: Hoahaoism   Language: English     9/3/2021     Total Time (in minutes): 20     Spiritual Assessment begun in [facility] through conversation with:         []Patient        [] Family    [] Friend(s)        Reason for Consult: Advance medical directive consult     Spiritual beliefs: (Please include comment if needed)     [] Identifies with a elena tradition:         [] Supported by a elena community:            [] Claims no spiritual orientation:           [] Seeking spiritual identity:                [] Adheres to an individual form of spirituality:           [x] Not able to assess:                           Identified resources for coping:      [] Prayer                               [] Music                  [] Guided Imagery     [] Family/friends                 [] Pet visits     [] Devotional reading                         [x] Unknown     [] Other:                                               Interventions offered during this visit: (See comments for more details)    Patient Interventions: Other (comment) (Unable to assess)           Plan of Care:     [] Support spiritual and/or cultural needs    [x] Support AMD and/or advance care planning process      [] Support grieving process   [] Coordinate Rites and/or Rituals    [] Coordination with community clergy   [] No spiritual needs identified at this time   [] Detailed Plan of Care below (See Comments)  [] Make referral to Music Therapy  [] Make referral to Pet Therapy     [] Make referral to Addiction services  [] Make referral to [facility]  [] Make referral to Spiritual Care Partner  [] No future visits requested        [] Follow up upon further referrals     Comments:  responded to a consult request to assist Mrs. Vilma Ott with an [address] in the ICU.  Mrs. Vilma Ott is on Covid+ contact precautions. Consulted with her nurse agreed to take the Advance Medical Directive forms into Mrs. Lewis's room when able. Her nurse did share that she would be able to have a conversation with the  via telephone. While on the unit,  attempted to contact Mrs. Viktor Trimble via telephone. She did not answer her room phone at this time. Consulted with her nurse. 's are available for further support upon referral  Brooke Bermudez. Aleta Ventura.      Paging Service: [phone])

## 2021-09-03 NOTE — PROCEDURES
[facility]ing[facility] 78  EEG    Name:  Lexy Ortega  MR#:  [id number]  :  1950  ACCOUNT #:  [de-identified]  DATE OF SERVICE:  2021      REFERRING PROVIDER:  Natalio Baez MD    CLINICAL HISTORY:  An EEG is requested on this 59-year-old lady to evaluate for epileptiform abnormality. MEDICATIONS:  Diprivan, aspirin, Protonix, Levophed, Zosyn, vancomycin. EEG REPORT:  This tracing is obtained portably while the patient is noted to be awake and confused. During this state, there are no normal background activities or wakefulness seen; instead the background consists of diffuse mixed frequency delta wave activity. Tracing is reactive. Hyperventilation not performed. Intermittent photic stimulation little alters the tracing. Sleep is not attained. IMPRESSION/INTERPRETATION:  This EEG recorded during the awake state is abnormal secondary to diffuse slowing and disorganization of the background rhythms indicative of a moderate-to-severe degree of bihemispheric dysfunction as is commonly seen with an encephalopathy which may have contributions from toxic, metabolic, diffuse structural, and/or pharmacologic effects and clinical correlation is recommended. No epileptiform abnormalities are seen.       Andres Harvey MD      [id number]  D:  2021 10:56  T:  2021 12:42  JOB #:  [id number]

## 2021-09-03 NOTE — PROGRESS NOTES
Problem: Airway Clearance - Ineffective  Goal: Achieve or maintain patent airway  Outcome: Progressing Towards Goal     Problem: Gas Exchange - Impaired  Goal: Absence of hypoxia  Outcome: Progressing Towards Goal  Goal: Promote optimal lung function  Outcome: Progressing Towards Goal     Problem: Breathing Pattern - Ineffective  Goal: Ability to achieve and maintain a regular respiratory rate  Outcome: Progressing Towards Goal     Problem: Body Temperature -  Risk of, Imbalanced  Goal: Will regain or maintain usual level of consciousness  Outcome: Progressing Towards Goal  Goal: Complications related to the disease process, condition or treatment will be avoided or minimized  Outcome: Progressing Towards Goal     Problem: Isolation Precautions - Risk of Spread of Infection  Goal: Prevent transmission of infectious organism to others  Outcome: Progressing Towards Goal     Problem: Nutrition Deficits  Goal: Optimize nutrtional status  Outcome: Progressing Towards Goal     Problem: Risk for Fluid Volume Deficit  Goal: Maintain normal heart rhythm  Outcome: Progressing Towards Goal  Goal: Maintain absence of muscle cramping  Outcome: Progressing Towards Goal  Goal: Maintain normal serum potassium, sodium, calcium, phosphorus, and pH  Outcome: Progressing Towards Goal     Problem: Loneliness or Risk for Loneliness  Goal: Demonstrate positive use of time alone when socialization is not possible  Outcome: Progressing Towards Goal     Problem: Fatigue  Goal: Verbalize increase energy and improved vitality  Outcome: Progressing Towards Goal     Problem: Falls - Risk of  Goal: *Absence of Falls  Description: Document Sushil Fall Risk and appropriate interventions in the flowsheet.   Outcome: Progressing Towards Goal  Note: Fall Risk Interventions:       Mentation Interventions: Bed/chair exit alarm, More frequent rounding, Room close to nurse's station, Update white board    Medication Interventions: Bed/chair exit alarm, Evaluate medications/consider consulting pharmacy, Patient to call before getting OOB    Elimination Interventions: Bed/chair exit alarm, Call light in reach, Toileting schedule/hourly rounds, Patient to call for help with toileting needs    History of Falls Interventions: Bed/chair exit alarm, Evaluate medications/consider consulting pharmacy, Room close to nurse's station         Problem: Pressure Injury - Risk of  Goal: *Prevention of pressure injury  Description: Document Enrrique Scale and appropriate interventions in the flowsheet. Outcome: Progressing Towards Goal  Note: Pressure Injury Interventions:  Sensory Interventions: Assess changes in LOC, Assess need for specialty bed, Keep linens dry and wrinkle-free, Minimize linen layers, Monitor skin under medical devices, Pressure redistribution bed/mattress (bed type), Turn and reposition approx. every two hours (pillows and wedges if needed)    Moisture Interventions: Absorbent underpads, Assess need for specialty bed, Internal/External urinary devices, Minimize layers    Activity Interventions: Assess need for specialty bed, Increase time out of bed, Pressure redistribution bed/mattress(bed type)    Mobility Interventions: Assess need for specialty bed, HOB 30 degrees or less, Pressure redistribution bed/mattress (bed type), Turn and reposition approx. every two hours(pillow and wedges)    Nutrition Interventions: Document food/fluid/supplement intake    Friction and Shear Interventions: Minimize layers, Transferring/repositioning devices, HOB 30 degrees or less                Problem:  Body Temperature -  Risk of, Imbalanced  Goal: Ability to maintain a body temperature within defined limits  Outcome: Not Met     Problem: Ventilator Management  Goal: *Adequate oxygenation and ventilation  Outcome: Resolved/Met  Goal: *Patient maintains clear airway/free of aspiration  Outcome: Resolved/Met  Goal: *Absence of infection signs and symptoms  Outcome: Resolved/Met  Goal: *Normal spontaneous ventilation  Outcome: Resolved/Met     Problem: Non-Violent Restraints  Goal: Removal from restraints as soon as assessed to be safe  Outcome: Resolved/Met  Goal: No harm/injury to patient while restraints in use  Outcome: Resolved/Met  Goal: Patient's dignity will be maintained  Outcome: Resolved/Met  Goal: Patient Interventions  Outcome: Resolved/Met

## 2021-09-03 NOTE — PROGRESS NOTES
Bedside and Verbal shift change report given to Virginie Levi RN (oncoming nurse) by Mohit Day RN (offgoing nurse). Report included the following information SBAR, Kardex, MAR and Cardiac Rhythm Sinus Rhythm. 0700: Patient without complaints of pain or discomfort. Encouraged patient to call for assistance before getting out of bed. Call light in reach. 0900: Patient without complaints. 1100: Patient without complaints. 1230: Chucks pad soaked with urine. Doan leaking. Doan removed. 1430: Patient in bedside chair. Encouraged to call for assistance before getting out of chair. Call light in reach. 1700: Helped patient back to bed. Call light in reach. 1800: Patient resting in bed. Watching tv. Bedside and Verbal shift change report given to Ramirez Motley RN (oncoming nurse) by Virginie Levi RN (offgoing nurse). Report included the following information SBAR, Kardex, MAR and Cardiac Rhythm Sinus Rhythm.

## 2021-09-03 NOTE — ACP (ADVANCE CARE PLANNING)
Advance Care Planning   Advance Care Planning Inpatient Note  [address]  Spiritual Care Department    Today's Date: 9/3/2021  Unit: [facility]    Received request from [facility] request. Upon review of chart and communication with care team, patient's decision making abilities are not in question. Patient was/were present in the room during visit. Goals of ACP Conversation:  Discuss Advance Care planning documents    Health Care Decision Makers:    No healthcare decision makers have been documented. Click here to complete [address] including selection of the Healthcare Decision Maker Relationship (ie \"Primary\")    Summary:  No Decision Maker named by patient at this time    Advance Care Planning Documents (Patient Wishes) on file:  None     Assessment:     responded to a consult request to assist Mrs. Ariadna Duran with an [address] in the ICU. Mrs. Ariadna Duran is on Covid+ contact precautions. Consulted with her nurse agreed to take the Advance Medical Directive forms into Mrs. Lewis's room when able. Her nurse did share that she would be able to have a conversation with the  via telephone. While on the unit,  attempted to contact Mrs. Ariadna Duran via telephone. She did not answer her room phone at this time.  's are available for further support upon referral       Interventions:  Deferred conversation as patient not interested in completing an advance directive at this time    Care Preferences Communicated:  No    Outcomes/Plan:  Deferred conversation as Mrs. Ariadna Duran is unavailable to speak via telephone at this time    Landon Keen, [address] [place] [address] Road on 9/3/2021 at 10:23 AM

## 2021-09-03 NOTE — PROGRESS NOTES
Spiritual Care Assessment/Progress Note  [address]      NAME: Colonel Craig      MRN: [id number]  AGE: 69 y.o. SEX: female  Shinto Affiliation: Taoism   Language: English     9/3/2021     Total Time (in minutes): 9     Spiritual Assessment begun in [facility] through conversation with:         []Patient        [] Family    [] Friend(s)        Reason for Consult: Advance medical directive consult     Spiritual beliefs: (Please include comment if needed)     [] Identifies with a elena tradition:         [] Supported by a elena community:            [] Claims no spiritual orientation:           [] Seeking spiritual identity:                [] Adheres to an individual form of spirituality:           [x] Not able to assess:                           Identified resources for coping:      [] Prayer                               [] Music                  [] Guided Imagery     [] Family/friends                 [] Pet visits     [] Devotional reading                         [x] Unknown     [] Other:                                               Interventions offered during this visit: (See comments for more details)    Patient Interventions: Other (comment) (Unable to assess)           Plan of Care:     [] Support spiritual and/or cultural needs    [x] Support AMD and/or advance care planning process      [] Support grieving process   [] Coordinate Rites and/or Rituals    [] Coordination with community clergy   [] No spiritual needs identified at this time   [] Detailed Plan of Care below (See Comments)  [] Make referral to Music Therapy  [] Make referral to Pet Therapy     [] Make referral to Addiction services  [] Make referral to [facility]  [] Make referral to Spiritual Care Partner  [] No future visits requested        [] Follow up upon further referrals     Comments:  attempted to follow up with Mrs. Jigna Alford regarding her Advance Medical Directive request in the ICU.  She continues to be on Covid+ contact precautions. Confirmed with her nurse that she did receive the AMD forms, but does not have her glasses to read through them at this time. He also shared that she would be able to have a conversation with the  via telephone, however,  observed that she was sleeping comfortably in her recliner at the time of the chaplains visit. 's are available for further support upon referral  Brooke Bermudez. Natalio Olsen.      Paging Service: [phone])

## 2021-09-03 NOTE — PROGRESS NOTES
Problem: Isolation Precautions - Risk of Spread of Infection  Goal: Prevent transmission of infectious organism to others  Outcome: Progressing Towards Goal     Problem: Nutrition Deficits  Goal: Optimize nutrtional status  Outcome: Progressing Towards Goal     Problem: Falls - Risk of  Goal: *Absence of Falls  Description: Document Vernia Colder Fall Risk and appropriate interventions in the flowsheet. Outcome: Progressing Towards Goal  Note: Fall Risk Interventions:       Mentation Interventions: Bed/chair exit alarm, Reorient patient, More frequent rounding, Increase mobility, Room close to nurse's station    Medication Interventions: Bed/chair exit alarm, Patient to call before getting OOB, Teach patient to arise slowly    Elimination Interventions: Call light in reach, Bed/chair exit alarm, Patient to call for help with toileting needs, Toileting schedule/hourly rounds    History of Falls Interventions: Bed/chair exit alarm, Room close to nurse's station         Problem: Patient Education: Go to Patient Education Activity  Goal: Patient/Family Education  Outcome: Progressing Towards Goal     Problem: Pressure Injury - Risk of  Goal: *Prevention of pressure injury  Description: Document Enrrique Scale and appropriate interventions in the flowsheet. Outcome: Progressing Towards Goal  Note: Pressure Injury Interventions:  Sensory Interventions: Assess changes in LOC, Check visual cues for pain, Float heels, Minimize linen layers, Turn and reposition approx. every two hours (pillows and wedges if needed)    Moisture Interventions: Absorbent underpads, Internal/External urinary devices    Activity Interventions: Assess need for specialty bed    Mobility Interventions: Assess need for specialty bed, Turn and reposition approx.  every two hours(pillow and wedges)    Nutrition Interventions: Document food/fluid/supplement intake    Friction and Shear Interventions: HOB 30 degrees or less                Problem: Patient Education:  Go to Education Activity  Goal: Patient/Family Education  Outcome: Progressing Towards Goal

## 2021-09-03 NOTE — PROGRESS NOTES
OT order received, chart reviewed. Team member consulted- per PT report, patient completing ADL and related mobility with independence. No skilled OT services indicated. Completing order.    Bogdan Gao, OTR/L

## 2021-09-03 NOTE — CONSULTS
Name: [facility]: [address]   : 1950 Admit Date: 2021   Phone: [phone]  Room: [facility]/   PCP: Katja Lucio   MRN: [id number]   Date: 9/3/2021  Code: No Order          Chart and notes reviewed. Data reviewed. I review the patient's current medications in the medical record at each encounter. I have evaluated and examined the patient. HPI:    8:05 AM       History was obtained from patient and chart. I was asked by Cristina Helton MD to see Kathryn Min in consultation for a chief complaint of respiratory failure. Ms. Melanie Tafoya is a pleasant 69year old female who presented via EMS unresponsive after being found down by her friend in th late afternoon. Unclear how long she was unresponsive before she was found as her friend last saw her awake and alert that morning, but was then at work all day. UDS at admission positive for cocaine, THC, and opiates. She was hypotensive, hypothermic, and urgently intubated in the ED due to apnea and airway protection, but has since been extubated and is now on RA. She also initially had a positive rapid COVID test.  Repeat rapid test was negative. PCR is in process. She has no complaints this morning. Denies fever, chills, cough, SOB, CP, abd pain, or LE pain/swelling. Does report exposure to a friend who has tested positive for COVID (friend reportedly asymptomatic per patient). She denies history of heart or lung disease. Does not use home O2 or inhalers. Chest CTA personally visualized and report reviewed. There is bibasilar atelectasis. Minimal bilateral pleural effusions. No PE. No focal airspace disease. CXR clear. WBC 11.2 - better  D-dimer 4.19 - better  CK 5616 - increasing  CRP 3.22  PCT 2.08  Lactic acid 1.1 - down from 2.4  TSH 2.75  Blood cx no growth x 2 days  Urine cx no growth    Code neuro head images with no acute process.     Past Medical History:   Diagnosis Date    Diabetes [facility])     Gastrointestinal disorder     History of mammography, screening 2013    Hypertension     Pap smear for cervical cancer screening 2013       Past Surgical History:   Procedure Laterality Date    HX ORTHOPAEDIC Right 2012    right knee medial meniscus surgery       Family History   Problem Relation Age of Onset   Bj Lobe Cancer Mother          at 59 lung cancer    Cancer Brother         half brother    Other Maternal Grandmother         GI hemorrhage    Other Father         Pt does not know her father    Cancer Brother         half brother       Social History     Tobacco Use    Smoking status: Current Every Day Smoker     Packs/day: 0.50     Types: Cigarettes    Smokeless tobacco: Never Used   Substance Use Topics    Alcohol use: No     Alcohol/week: 0.0 standard drinks       Allergies   Allergen Reactions    Amoxicillin Nausea and Vomiting    Penicillin G Nausea and Vomiting     Pt can't remember that reaction it caused. Current Facility-Administered Medications   Medication Dose Route Frequency    fentaNYL citrate (PF) injection 50 mcg  50 mcg IntraVENous Q1H PRN    albuterol-ipratropium (DUO-NEB) 2.5 MG-0.5 MG/3 ML  3 mL Nebulization Q6H PRN    glucose chewable tablet 16 g  4 Tablet Oral PRN    dextrose (D50W) injection syrg 12.5-25 g  25-50 mL IntraVENous PRN    glucagon (GLUCAGEN) injection 1 mg  1 mg IntraMUSCular PRN    insulin lispro (HUMALOG) injection   SubCUTAneous AC&HS    dexAMETHasone (DECADRON) tablet 6 mg  6 mg Oral DAILY    0.45% sodium chloride infusion  100 mL/hr IntraVENous CONTINUOUS    PARoxetine (PAXIL) tablet 40 mg  40 mg Oral DAILY    enoxaparin (LOVENOX) injection 40 mg  40 mg SubCUTAneous Q24H    hydrALAZINE (APRESOLINE) 20 mg/mL injection 10 mg  10 mg IntraVENous Q6H PRN         REVIEW OF SYSTEMS   12 point ROS negative except as stated in the HPI.       Physical Exam:   Visit Vitals  /70   Pulse 60   Temp 98.9 °F (37.2 °C)   Resp 22   Ht 5' 4\" (1.626 m)   Wt 64 kg (141 lb 1.5 oz)   SpO2 94%   BMI 24.22 kg/m²       General:  Alert, cooperative, no distress, appears stated age. Head:  Normocephalic, without obvious abnormality, atraumatic. Eyes:  Conjunctivae/corneas clear. Nose: Nares normal. Septum midline. Mucosa normal.    Throat: Lips, mucosa, and tongue normal.    Neck: Supple, symmetrical, trachea midline, no adenopathy. Lungs:   Clear to auscultation bilaterally. Chest wall:  No tenderness or deformity. Heart:  Regular rate and rhythm, S1, S2 normal, no murmur, click, rub or gallop. Abdomen:   Soft, non-tender. Bowel sounds normal. No masses,  No organomegaly. Extremities: Extremities normal, atraumatic, no cyanosis or edema. Pulses: 2+ and symmetric all extremities. Skin: Skin color, texture, turgor normal. No rashes or lesions   Lymph nodes: Cervical, supraclavicular nodes normal.   Neurologic: Grossly nonfocal       Lab Results   Component Value Date/Time    Sodium 142 09/03/2021 04:42 AM    Potassium 3.7 09/03/2021 04:42 AM    Chloride 111 (H) 09/03/2021 04:42 AM    CO2 28 09/03/2021 04:42 AM    BUN 12 09/03/2021 04:42 AM    Creatinine 0.39 (L) 09/03/2021 04:42 AM    Glucose 125 (H) 09/03/2021 04:42 AM    Calcium 8.5 09/03/2021 04:42 AM    Magnesium 1.9 09/03/2021 04:42 AM    Phosphorus 1.8 (L) 09/03/2021 04:42 AM    Lactic acid 1.1 09/02/2021 03:46 AM       Lab Results   Component Value Date/Time    WBC 11.2 (H) 09/03/2021 04:42 AM    HGB 12.3 09/03/2021 04:42 AM    PLATELET 719 (L) 77/03/2019 04:42 AM    MCV 90.9 09/03/2021 04:42 AM       Lab Results   Component Value Date/Time    aPTT 55.2 (H) 09/02/2021 06:30 AM    Alk.  phosphatase 65 09/03/2021 04:42 AM    Protein, total 5.8 (L) 09/03/2021 04:42 AM    Albumin 2.9 (L) 09/03/2021 04:42 AM    Globulin 2.9 09/03/2021 04:42 AM       Lab Results   Component Value Date/Time    Ferritin 940 (H) 09/01/2021 05:47 PM       Lab Results   Component Value Date/Time    C-Reactive protein 3.22 (H) 09/03/2021 04:42 AM    TSH 2.75 09/01/2021 05:47 PM        Lab Results   Component Value Date/Time    PH 7.32 (L) 09/02/2021 11:08 AM    PCO2 47 (H) 09/02/2021 11:08 AM    PO2 145 (H) 09/02/2021 11:08 AM    HCO3 23 09/02/2021 11:08 AM    FIO2 40 09/02/2021 11:08 AM       Lab Results   Component Value Date/Time    CK 5,616 (H) 09/03/2021 04:42 AM    Troponin-I, Qt. 0.26 (H) 09/02/2021 09:54 AM        Lab Results   Component Value Date/Time    Culture result: No growth (<1,000 CFU/ML) 09/01/2021 07:01 PM    Culture result: NO GROWTH 2 DAYS 09/01/2021 07:01 PM       No results found for: TOXA1, RPR, HBCM, HBSAG, HAAB, HCAB1, HAAT, G6PD, CRYAC, HIVGT, HIVR, HIV1, HIV12, HIVPC, HIVRPI    Lab Results   Component Value Date/Time    CK 5,616 (H) 09/03/2021 04:42 AM    CK 3,069 (H) 09/02/2021 09:54 AM       Lab Results   Component Value Date/Time    Color YELLOW/STRAW 09/01/2021 07:01 PM    Appearance CLOUDY (A) 09/01/2021 07:01 PM    pH (UA) 5.5 09/01/2021 07:01 PM    Protein 30 (A) 09/01/2021 07:01 PM    Glucose 100 (A) 09/01/2021 07:01 PM    Ketone Negative 09/01/2021 07:01 PM    Bilirubin Negative 09/01/2021 07:01 PM    Blood LARGE (A) 09/01/2021 07:01 PM    Urobilinogen 0.2 09/01/2021 07:01 PM    Nitrites Negative 09/01/2021 07:01 PM    Leukocyte Esterase Negative 09/01/2021 07:01 PM    WBC 0-4 09/01/2021 07:01 PM    RBC 5-10 09/01/2021 07:01 PM    Bacteria Negative 09/01/2021 07:01 PM       IMPRESSION  · Acute hypoxemic respiratory failure requiring intubation - extubated on 9/2  · Hypotensive requiring pressors - off Levophed since noon yesterday  · Acute metabolic encephalopathy - suspect due to polysubstance abuse  · Rhabdomyolysis  · Leukocytosis and COVID PUI  · Elevated troponin - likely supply/demand mismatch  · Elevated LFTs with hx of chronic Hepatitis C    PLAN  · Off O2 with normal sats on RA  · Neurology has evaluated and EEG/MRI planned  · F/u COVID PCR. Continue decadron for now.   Does not meet criteria for Remdesivir or Actemra. · Agree we can hold off on abx for now given she is afebrile with improved WBC, negative cultures, and otherwise asymptomatic. Trend PCT and lactate. · Continue IVFs and trend CRP  · Cardiology has evaluated. ECHO ordered and pending. No further work up planned at this time. · Check LE dopplers  · Lovenox prophylaxis      Thank you for allowing us to participate in the care of this patient. We will be happy to follow along in her progress with you.     Augusta Burris

## 2021-09-03 NOTE — PROGRESS NOTES
Transition of care note:    RUR 22%(moderate readmission risk)    Problems:    Covid +  Polysubstance abuse-UDS positive for cocaine,opiates,THC  Acute hypoxic respiratory failure with hypoxia and hypercarbia  Found unresponsive @ home  Hypotensive  Minimal bilateral pleural effusions  Bibasilar atelectasis  Covid pneumonia    Today:    Pt was extubated on 9/2/21,  Pt is currently on room air. Pt has been off of pressor support since yesterday. Abdiel Estes [facility] consultation was placed for pt to complete an AMD as she is estranged from her  greater than 10 years but is still legally  to her . I attempted to contact pt both by her cell phone and by hospital phone but pt did not answer. I left pt a voice message on her cell phone requesting she contact her friend,Pearl Shrestha as I cannot give her friend any information as he is not her legal NOK. I have requested for pt to please contact me also to discuss any discharge needs. PT/OT have been consulted. Pt has transfer orders to step-down.     Maty Kc

## 2021-09-03 NOTE — PROGRESS NOTES
[facility] and [address] at [facility] at [address] [facility], [address] [place] [address] [facility], [address] [place] [address]   [phone]              Chief Complaint   Patient presents with   Argie Sic     Current Facility-Administered Medications   Medication Dose Route Frequency Provider Last Rate Last Admin    fentaNYL citrate (PF) injection 50 mcg  50 mcg IntraVENous Q1H PRN Joanna Maldonado MD        albuterol-ipratropium (DUO-NEB) 2.5 MG-0.5 MG/3 ML  3 mL Nebulization Q6H PRN Laura Villasenor MD        glucose chewable tablet 16 g  4 Tablet Oral PRN Laura Villasenor MD        dextrose (D50W) injection syrg 12.5-25 g  25-50 mL IntraVENous PRN Laura Villasenor MD        glucagon [facility]) injection 1 mg  1 mg IntraMUSCular PRN Laura Villasenor MD        insulin lispro (HUMALOG) injection   SubCUTAneous AC&HS Laura Villasenor MD   2 Units at 09/02/21 1718    dexAMETHasone (DECADRON) tablet 6 mg  6 mg Oral DAILY Laura Villasenor MD   6 mg at 09/02/21 1348    0.45% sodium chloride infusion  100 mL/hr IntraVENous CONTINUOUS Laura Villasenor  mL/hr at 09/03/21 0018 100 mL/hr at 09/03/21 0018    PARoxetine (PAXIL) tablet 40 mg  40 mg Oral DAILY Laura Villasenor MD        enoxaparin (LOVENOX) injection 40 mg  40 mg SubCUTAneous Q24H Laura Villasenor MD        hydrALAZINE (APRESOLINE) 20 mg/mL injection 10 mg  10 mg IntraVENous Q6H PRN Kirsten Sherwood MD          Allergies   Allergen Reactions    Amoxicillin Nausea and Vomiting    Penicillin G Nausea and Vomiting     Pt can't remember that reaction it caused.      Social History     Tobacco Use    Smoking status: Current Every Day Smoker     Packs/day: 0.50     Types: Cigarettes    Smokeless tobacco: Never Used   Substance Use Topics    Alcohol use: No     Alcohol/week: 0.0 standard drinks    Drug use: No     69year-old lady who we are following after she was found down by her neighbor. She was found to have Covid positive test but then had another rapid test that was negative. PCR test is pending. She had evidence of polysubstance abuse on her toxicology screen. Dry CT as well as a CTA and CT perfusion were unremarkable. EEG demonstrated diffuse delta activity consistent with encephalopathy. MRI is pending    Examination  Visit Vitals  /70 (BP 1 Location: Left upper arm, BP Patient Position: At rest)   Pulse 60   Temp 99.3 °F (37.4 °C)   Resp 22   Ht 5' 4\" (1.626 m)   Wt 64 kg (141 lb 1.5 oz)   SpO2 94%   BMI 24.22 kg/m²     This morning she is awake and alert sitting up in bed watching television. Echocardiogram is being performed    Impression/Plan  65-year-old lady found down with evidence of polysubstance abuse plus minus Covid and PCR test is pending  Thus far EEG was consistent with her encephalopathic state  No evidence of epileptiform abnormality  CT imaging including vascular tree unremarkable  Would continue treating her underlying conditions at this point  The team will follow up if her MRI demonstrates anything that needs to be addressed  Thanks for your request for consultation    Bolivar Rivera MD        This note was created using voice recognition software. Despite editing, there may be syntax errors.

## 2021-09-03 NOTE — PROGRESS NOTES
[facility]    Medical Progress Note      NAME: Cristhian Bolton   :  1950  MRM:  [id number]    Date/Time of service 9/3/2021  12:36 PM          Assessment and Plan:     Acute metabolic encephalopathy / Altered mental status / Anxiety and depression / Mild tetrahydrocannabinol (THC) abuse / Cocaine abuse / Opiate abuse, episodic - POA, better. Multifactorial, DDx cocaine, THC, opiates, hypoxia, ammonia, dementia, other. Resumed paxil.  Aware does not show any opiate Rx. Neurology consulted and ordered MRI. Unremarkable EEG    Acute hypoxemic respiratory failure - POA, unclear etiology. Suspect central suppression, and covid. Extubated . Continue oxygen as needed, wean. Currently 3L. CT chest without PE, PNA, edema or mass. Elevated LFTs / Hyperammonemia - POA due to shock liver, on top of chronic hepatitis. Roughly stable. Monitor. Rhabdomyolysis / Elevated CK - POA, mild, due to being found down. CK a bit higher. Monitor. Hydrate    Shock / Hx HTN (hypertension) - POA, unclear type. Most likely hypovolemic, vs covid septic, vs cardiogenic vs drugs vs other. Now better. Triturated off phenylephrine. I will hold lisinopril for now. Cardiology consulted and checking ECHO. Pneumonia due to COVID 19 / Leukocytopenia - Positive rapid test, second tests negative. Check RVP. CT and Xray normal.  But given her illness and hypoxia, assumed infection and started decadron, pulmonary to consider remdesivir and actemra in setting of other acute illness. With negative xray will hold off other Abx for now. NGTD on urine and blood cx. DANIA (acute kidney injury) / Hypernatremia / Elevated lactic acid level - POA, presumed due to IVVD. Much better after hydration. Monitor. Diabetes mellitus type 2, controlled - Diabetic diet and counseling. SSI per protocol. Holding home metformin. Check A1c.     Hepatitis C, chronic - Saw Dr. Nba Sandhu and stopped treatment Hyperthyroid - She takes methimazole, but TSH normal.  Holding methimazole      Primary osteoarthritis of both knees - Tylenol prn    Pure hypercholesterolemia - Check panel, hold crestor    Osteoporosis - Not on meds    D-dimer, elevated - POA, likely due to trauma etc, but getting CT chest to rule out PE, LE doppler to rule out DVT         Subjective:     Chief Complaint:  Weak, vague, but better    ROS:  (bold if positive, if negative)    Tolerating minimal PT Tolerating some diet        Objective:     Last 24hrs VS reviewed since prior progress note.  Most recent are:    Visit Vitals  /70   Pulse 60   Temp 98.9 °F (37.2 °C)   Resp 22   Ht 5' 4\" (1.626 m)   Wt 64 kg (141 lb 1.5 oz)   SpO2 94%   BMI 24.22 kg/m²     SpO2 Readings from Last 6 Encounters:   09/03/21 94%   08/20/21 98%   11/01/17 96%   06/01/17 93%   01/24/15 97%   01/27/14 97%    O2 Flow Rate (L/min): 3 l/min       Intake/Output Summary (Last 24 hours) at 9/3/2021 0715  Last data filed at 9/3/2021 0400  Gross per 24 hour   Intake 3067.22 ml   Output 3450 ml   Net -382.78 ml        Physical Exam:    Gen:  Frail, ill appearing, in no acute distress  HEENT:  Pink conjunctivae, PERRL, hearing intact to voice, moist mucous membranes  Neck:  Supple, without masses, thyroid non-tender  Resp:  No accessory muscle use, clear breath sounds without wheezes rales or rhonchi  Card:  No murmurs, normal S1, S2 without thrills, bruits or peripheral edema  Abd:  Soft, non-tender, non-distended, normoactive bowel sounds are present, no mass  Lymph:  No cervical or inguinal adenopathy  Musc:  No cyanosis or clubbing  Skin:  No rashes or ulcers, skin turgor is reduced  Neuro:  Cranial nerves are grossly intact, general motor weakness, follows commands vaguely  Psych:  Poor insight, oriented to person    Telemetry reviewed:   normal sinus rhythm  __________________________________________________________________  Medications Reviewed: (see below)  Medications: Current Facility-Administered Medications   Medication Dose Route Frequency    fentaNYL citrate (PF) injection 50 mcg  50 mcg IntraVENous Q1H PRN    albuterol-ipratropium (DUO-NEB) 2.5 MG-0.5 MG/3 ML  3 mL Nebulization Q6H PRN    glucose chewable tablet 16 g  4 Tablet Oral PRN    dextrose (D50W) injection syrg 12.5-25 g  25-50 mL IntraVENous PRN    glucagon (GLUCAGEN) injection 1 mg  1 mg IntraMUSCular PRN    insulin lispro (HUMALOG) injection   SubCUTAneous AC&HS    dexAMETHasone (DECADRON) tablet 6 mg  6 mg Oral DAILY    0.45% sodium chloride infusion  100 mL/hr IntraVENous CONTINUOUS    PARoxetine (PAXIL) tablet 40 mg  40 mg Oral DAILY    enoxaparin (LOVENOX) injection 40 mg  40 mg SubCUTAneous Q24H    hydrALAZINE (APRESOLINE) 20 mg/mL injection 10 mg  10 mg IntraVENous Q6H PRN        Lab Data Reviewed: (see below)  Lab Review:     Recent Labs     09/03/21  0442 09/02/21  0346 09/01/21  1747   WBC 11.2* 19.5* 16.7*   HGB 12.3 13.9 16.1*   HCT 36.1 43.9 51.2*   * 166 216     Recent Labs     09/03/21  0442 09/02/21  0346 09/01/21  1747    146* 143   K 3.7 4.0 5.0   * 117* 109*   CO2 28 24 25   * 138* 223*   BUN 12 24* 28*   CREA 0.39* 0.73 1.16*   CA 8.5 7.8* 8.6   MG 1.9  --  2.5*   PHOS 1.8*  --   --    ALB 2.9* 3.2* 4.0   TBILI 0.7 0.6 0.4   * 99* 77     Lab Results   Component Value Date/Time    Glucose (POC) 108 09/02/2021 10:44 PM    Glucose (POC) 188 (H) 09/02/2021 04:46 PM    Glucose (POC) 143 (H) 09/02/2021 11:52 AM    Glucose (POC) 135 (H) 09/02/2021 06:24 AM    Glucose (POC) 135 (H) 09/02/2021 01:34 AM     Recent Labs     09/02/21  1108 09/02/21  0444 09/01/21  1940   PH 7.32* 7.26* 7.25*   PCO2 47* 51* 42   PO2 145* 122* 123*   HCO3 23 22 18*   FIO2 40 40 40     No results for input(s): INR, INREXT, INREXT in the last 72 hours.   All Micro Results     Procedure Component Value Units Date/Time    RESPIRATORY VIRUS PANEL W/COVID-19, PCR [[id number]]     Order Status: Sent Specimen: NASOPHARYNGEAL SWAB     CULTURE, BLOOD, PAIRED [[id number]] Collected: 09/01/21 1901    Order Status: Completed Specimen: Blood Updated: 09/03/21 0611     Special Requests: NO SPECIAL REQUESTS        Culture result: NO GROWTH 2 DAYS       CULTURE, URINE [[id number]] Collected: 09/01/21 1901    Order Status: Completed Specimen: Cath Urine Updated: 09/03/21 0016     Special Requests: NO SPECIAL REQUESTS        Culture result: No growth (<1,000 CFU/ML)       CULTURE, BLOOD [[id number]]     Order Status: Sent Specimen: Blood     COVID-19 RAPID TEST [[id number]]  (Abnormal) Collected: 09/01/21 1901    Order Status: Completed Specimen: Nasopharyngeal Updated: 09/01/21 1939     Specimen source Nasopharyngeal        COVID-19 rapid test Detected        Comment: Rapid Abbott ID Now       The specimen is POSITIVE for SARS-CoV-2, the novel coronavirus associated with COVID-19. This test has been authorized by the FDA under an Emergency Use Authorization (EUA) for use by authorized laboratories. Fact sheet for Healthcare Providers: ConventionUpdate.co.nz  Fact sheet for Patients: ConventionUpdate.co.nz       Methodology: Isothermal Nucleic Acid Amplification  CALLED TO AND READ BACK BY  MANUELA MICHAEL AT 1937/JS         CULTURE, BLOOD, PAIRED [[id number]]     Order Status: Sent Specimen: Blood           Other pertinent lab: none    Total time spent with patient: 30 Minutes I personally reviewed chart, notes, data and current medications in the medical record. I have personally examined and treated the patient at bedside during this period.                  Care Plan discussed with: Patient, Care Manager, Nursing Staff, Consultant/Specialist and >50% of time spent in counseling and coordination of care    Discussed:  Care Plan and D/C Planning    Prophylaxis:  Lovenox and H2B/PPI    Disposition:  Home w/Family           ___________________________________________________    Attending Physician: Kailee Cavazos MD

## 2021-09-03 NOTE — ACP (ADVANCE CARE PLANNING)
Advance Care Planning   Advance Care Planning Inpatient Note  [address]  Spiritual Care Department    Today's Date: 9/3/2021  Unit: [facility]    Received request from Consult request. Upon review of chart and communication with care team, Spiritual Care will defer Advance Care planning with patient at this time. Patient was/were present in the room during visit. Goals of ACP Conversation:  Spiritual Care will defer ACP discussion at this time    [address]:    No healthcare decision makers have been documented. Click here to complete [address] including selection of the Healthcare Decision Maker Relationship (ie \"Primary\")    Summary:  No Decision Maker named by patient at this time    Advance Care Planning Documents (Patient Wishes) on file:  None     Assessment:     attempted to follow up with Mrs. Sav Haile regarding her Advance Medical Directive request in the ICU. She continues to be on Covid+ contact precautions. Confirmed with her nurse that she did receive the AMD forms, but does not have her glasses to read through them at this time. He also shared that she would be able to have a conversation with the  via telephone, however,  observed that she was sleeping comfortably in her recliner at the time of the chaplains visit. 's are available for further support upon referral  Brooke Bermudez. Mejia Borja.      Paging Service: [place] ([phone])     Interventions:  Deferred conversation as patient not interested in completing an advance directive at this time    Care Preferences Communicated:  No    Outcomes/Plan:  Defer conversation at this time    Tereza Jeffries, [address] [place] [address] Road on 9/3/2021 at 2:59 PM

## 2021-09-03 NOTE — PROGRESS NOTES
PHYSICAL THERAPY EVALUATION/DISCHARGE  Patient: Jb Jimenez (24 y.o. female)  Date: 9/3/2021  Primary Diagnosis: Altered mental status [R41.82]       Precautions:   Fall      ASSESSMENT  Based on the objective data described below, the patient presents with with alerted mental status after being found unresponsive at home and on admission +for cocaine, THC and opiates. Patient also COVID rule out. She is now extubated (yesterday) and she tolerated the session on room air with vitals stable- see below. She is Independent with all mobility and demonstrates no loss of balance or instability. She is at her baseline for mobility. Functional Outcome Measure: The patient scored 28/28 on the tinetti outcome measure which is indicative of low fall risk. Other factors to consider for discharge:      Further skilled acute physical therapy is not indicated at this time. PLAN :  Recommendation for discharge: (in order for the patient to meet his/her long term goals)  No skilled physical therapy/ follow up rehabilitation needs identified at this time. This discharge recommendation:  A follow-up discussion with the attending provider and/or case management is planned    IF patient discharges home will need the following DME: none       SUBJECTIVE:   Patient stated i need to pee.     OBJECTIVE DATA SUMMARY:   HISTORY:    Past Medical History:   Diagnosis Date    Diabetes ([address]     Gastrointestinal disorder     History of mammography, screening 2013    Hypertension     Pap smear for cervical cancer screening 2013     Past Surgical History:   Procedure Laterality Date    HX ORTHOPAEDIC Right 2012    right knee medial meniscus surgery       Prior level of function: see above  Personal factors and/or comorbidities impacting plan of care: see below    Home Situation  Home Environment: Private residence  # Steps to Enter: 6  Rails to Enter: Yes  Hand Rails : Left  Wheelchair Ramp: No  One/Two Story Residence: Two story  # of Interior Steps: 13  Interior Rails: Left  Lift Chair Available: No  Living Alone: No  Support Systems: Friends \ neighbors  Patient Expects to be Discharged toVF Cor[de-identified]ration  Current DME Used/Available at Home: Cane, straight    EXAMINATION/PRESENTATION/DECISION MAKING:   Vitals:    09/03/21 1100 09/03/21 1130 09/03/21 1200 09/03/21 1222   BP: 128/64 (!) 144/97 135/69 (!) 148/69   BP 1 Location:    Left arm   BP Patient Position:    Supine   Pulse: 70 78 65 75   Temp:       Resp: 19 19 23    Height:       Weight:       SpO2: (!) 89% 95% 93% 95%       Critical Behavior:  Neurologic State: Alert  Orientation Level: Oriented to situation, Oriented to time, Oriented to person, Disoriented to place  Cognition: Follows commands     Hearing:     Skin:  All exposed intact  Edema: none noted  Range Of Motion:  AROM: Within functional limits           PROM: Within functional limits           Strength:    Strength:  Within functional limits                    Tone & Sensation:   Tone: Normal                              Coordination:  Coordination: Within functional limits  Vision:      Functional Mobility:  Bed Mobility:  Rolling: Independent  Supine to Sit: Independent  Sit to Supine: Independent     Transfers:  Sit to Stand: Independent  Stand to Sit: Independent        Bed to Chair: Independent              Balance:      Ambulation/Gait Training:  Distance (ft): 20 Feet (ft)  Assistive Device: Gait belt  Ambulation - Level of Assistance: Independent     Gait Description (WDL): Exceptions to WDL                         Functional Measure:  Tinetti test:    Sitting Balance: 1  Arises: 2  Attempts to Rise: 2  Immediate Standing Balance: 2  Standing Balance: 2  Nudged: 2  Eyes Closed: 1  Turn 360 Degrees - Continuous/Discontinuous: 1  Turn 360 Degrees - Steady/Unsteady: 1  Sitting Down: 2  Balance Score: 16 Balance total score  Indication of Gait: 1  R Step Length/Height: 1  L Step Length/Height: 1  R Foot Clearance: 1  L Foot Clearance: 1  Step Symmetry: 1  Step Continuity: 1  Path: 2  Trunk: 2  Walking Time: 1  Gait Score: 12 Gait total score  Total Score: 28/28 Overall total score         Tinetti Tool Score Risk of Falls  <19 = High Fall Risk  19-24 = Moderate Fall Risk  25-28 = Low Fall Risk  Rogeretti ME. Performance-Oriented Assessment of Mobility Problems in Elderly Patients. [facility]; [id number]. (Scoring Description: PT Bulletin Feb. 10, 1993)    Older adults: Prema Ni et al, 2009; n = 1000 [facility] elderly evaluated with ABC, ASHWIN, ADL, and IADL)  · Mean ASHWIN score for males aged 69-68 years = 26.21(3.40)  · Mean ASHWIN score for females age 69-68 years = 25.16(4.30)  · Mean ASHWIN score for males over 80 years = 23.29(6.02)  · Mean ASHWIN score for females over 80 years = 17.20(8.32)        Physical Therapy Evaluation Charge Determination   History Examination Presentation Decision-Making   HIGH Complexity :3+ comorbidities / personal factors will impact the outcome/ POC  LOW Complexity : 1-2 Standardized tests and measures addressing body structure, function, activity limitation and / or participation in recreation  LOW Complexity : Stable, uncomplicated  Other outcome measures tinetti  LOW       Based on the above components, the patient evaluation is determined to be of the following complexity level: LOW     Pain Rating:  None reported    Activity Tolerance:   Good      After treatment patient left in no apparent distress:   Sitting in chair and Call bell within reach    COMMUNICATION/EDUCATION:   The patients plan of care was discussed with: Registered nurse. Fall prevention education was provided and the patient/caregiver indicated understanding., Patient/family have participated as able in goal setting and plan of care. and Patient/family agree to work toward stated goals and plan of care.     Thank you for this referral.  Estephania Frausto, PT, DPT   Time Calculation: 34 mins

## 2021-09-04 VITALS
HEIGHT: 64 IN | DIASTOLIC BLOOD PRESSURE: 82 MMHG | WEIGHT: 141.09 LBS | BODY MASS INDEX: 24.09 KG/M2 | OXYGEN SATURATION: 99 % | RESPIRATION RATE: 18 BRPM | SYSTOLIC BLOOD PRESSURE: 172 MMHG | HEART RATE: 71 BPM | TEMPERATURE: 98.3 F

## 2021-09-04 LAB
ALBUMIN SERPL-MCNC: 3.3 G/DL (ref 3.5–5)
ALBUMIN/GLOB SERPL: 0.9 {RATIO} (ref 1.1–2.2)
ALP SERPL-CCNC: 75 U/L (ref 45–117)
ALT SERPL-CCNC: 128 U/L (ref 12–78)
ANION GAP SERPL CALC-SCNC: 4 MMOL/L (ref 5–15)
AST SERPL-CCNC: 200 U/L (ref 15–37)
BILIRUB SERPL-MCNC: 0.8 MG/DL (ref 0.2–1)
BUN SERPL-MCNC: 15 MG/DL (ref 6–20)
BUN/CREAT SERPL: 26 (ref 12–20)
CALCIUM SERPL-MCNC: 8.8 MG/DL (ref 8.5–10.1)
CHLORIDE SERPL-SCNC: 109 MMOL/L (ref 97–108)
CK SERPL-CCNC: 5537 U/L (ref 26–192)
CO2 SERPL-SCNC: 30 MMOL/L (ref 21–32)
CREAT SERPL-MCNC: 0.58 MG/DL (ref 0.55–1.02)
ERYTHROCYTE [DISTWIDTH] IN BLOOD BY AUTOMATED COUNT: 12.6 % (ref 11.5–14.5)
GLOBULIN SER CALC-MCNC: 3.5 G/DL (ref 2–4)
GLUCOSE BLD STRIP.AUTO-MCNC: 139 MG/DL (ref 65–117)
GLUCOSE SERPL-MCNC: 117 MG/DL (ref 65–100)
HCT VFR BLD AUTO: 40.9 % (ref 35–47)
HGB BLD-MCNC: 13.7 G/DL (ref 11.5–16)
MAGNESIUM SERPL-MCNC: 2 MG/DL (ref 1.6–2.4)
MCH RBC QN AUTO: 30.3 PG (ref 26–34)
MCHC RBC AUTO-ENTMCNC: 33.5 G/DL (ref 30–36.5)
MCV RBC AUTO: 90.5 FL (ref 80–99)
NRBC # BLD: 0 K/UL (ref 0–0.01)
NRBC BLD-RTO: 0 PER 100 WBC
PHOSPHATE SERPL-MCNC: 1.9 MG/DL (ref 2.6–4.7)
PLATELET # BLD AUTO: 148 K/UL (ref 150–400)
POTASSIUM SERPL-SCNC: 3.7 MMOL/L (ref 3.5–5.1)
PROT SERPL-MCNC: 6.8 G/DL (ref 6.4–8.2)
RBC # BLD AUTO: 4.52 M/UL (ref 3.8–5.2)
SERVICE CMNT-IMP: ABNORMAL
SODIUM SERPL-SCNC: 143 MMOL/L (ref 136–145)
WBC # BLD AUTO: 10.5 K/UL (ref 3.6–11)

## 2021-09-04 PROCEDURE — 74011250637 HC RX REV CODE- 250/637: Performed by: INTERNAL MEDICINE

## 2021-09-04 PROCEDURE — 2709999900 HC NON-CHARGEABLE SUPPLY

## 2021-09-04 PROCEDURE — 82550 ASSAY OF CK (CPK): CPT

## 2021-09-04 PROCEDURE — 36415 COLL VENOUS BLD VENIPUNCTURE: CPT

## 2021-09-04 PROCEDURE — 84100 ASSAY OF PHOSPHORUS: CPT

## 2021-09-04 PROCEDURE — 80053 COMPREHEN METABOLIC PANEL: CPT

## 2021-09-04 PROCEDURE — 82962 GLUCOSE BLOOD TEST: CPT

## 2021-09-04 PROCEDURE — 85027 COMPLETE CBC AUTOMATED: CPT

## 2021-09-04 PROCEDURE — 83735 ASSAY OF MAGNESIUM: CPT

## 2021-09-04 RX ORDER — LISINOPRIL 5 MG/1
2.5 TABLET ORAL DAILY
Status: DISCONTINUED | OUTPATIENT
Start: 2021-09-04 | End: 2021-09-04 | Stop reason: HOSPADM

## 2021-09-04 RX ORDER — METFORMIN HYDROCHLORIDE 500 MG/1
500 TABLET ORAL 2 TIMES DAILY WITH MEALS
Status: DISCONTINUED | OUTPATIENT
Start: 2021-09-04 | End: 2021-09-04 | Stop reason: HOSPADM

## 2021-09-04 RX ORDER — ROSUVASTATIN CALCIUM 10 MG/1
5 TABLET, COATED ORAL DAILY
Status: DISCONTINUED | OUTPATIENT
Start: 2021-09-04 | End: 2021-09-04 | Stop reason: HOSPADM

## 2021-09-04 RX ADMIN — METFORMIN HYDROCHLORIDE 500 MG: 500 TABLET ORAL at 09:48

## 2021-09-04 RX ADMIN — LISINOPRIL 2.5 MG: 5 TABLET ORAL at 09:49

## 2021-09-04 RX ADMIN — ROSUVASTATIN CALCIUM 5 MG: 10 TABLET, FILM COATED ORAL at 09:49

## 2021-09-04 RX ADMIN — PAROXETINE HYDROCHLORIDE 40 MG: 20 TABLET, FILM COATED ORAL at 09:54

## 2021-09-04 NOTE — DISCHARGE SUMMARY
Physician Discharge Summary     Patient ID:  Magda Swenson  [id number]  40 y.o.  1950    Admit date: 9/1/2021    Discharge date of service and time: 9/4/2021    Admission Diagnoses: Altered mental status [R41.82]    Discharge Diagnoses:    Principal Diagnosis     Acute metabolic encephalopathy                                             Hospital Course and other diagnoses  Acute metabolic encephalopathy / Altered mental status / Anxiety and depression / Mild tetrahydrocannabinol (THC) abuse / Cocaine abuse / Opiate abuse, episodic - POA, better. Multifactorial, DDx cocaine, THC, opiates, hypoxia, ammonia, dementia, other. Resumed paxil.  Aware does not show any opiate Rx. Neurology consulted. Unremarkable EEG and MRI.     Acute hypoxemic respiratory failure - POA, unclear etiology. Suspect central suppression, and covid. Extubated 9/2. Continue oxygen as needed, wean to RA. CT chest without PE, PNA, edema or mass.      Elevated LFTs / Hyperammonemia - POA due to shock liver, on top of chronic hepatitis. Roughly stable. Monitor as outpatient     Rhabdomyolysis / Elevated CK - POA, mild, due to being found down. CK now stable. Ensourage Hydration     Shock / Hx HTN (hypertension) - POA, unclear type. Most likely hypovolemic, vs covid septic, vs cardiogenic vs drugs vs other. Now better. Triturated off phenylephrine. Resume lisinopril. Cardiology consulted. Normal ECHO.     Pneumonia due to COVID 19 / Leukocytopenia - Negative definitive testing. CT and Xray normal.  Negative xray. No Abx for now. NGTD on urine and blood cx.     DANIA (acute kidney injury) / Hypernatremia / Elevated lactic acid level - POA, presumed due to IVVD. Much better after hydration. Monitor.     Diabetes mellitus type 2, controlled - Diabetic diet and counseling. SSI per protocol. Holding home metformin.  Check A1c.     Hepatitis C, chronic - Saw Dr. Blane Dasilva and stopped treatment      Hyperthyroid - She takes methimazole, but TSH normal.  Holding methimazole      Primary osteoarthritis of both knees - Tylenol prn     Pure hypercholesterolemia - Check panel, hold crestor     Osteoporosis - Not on meds     D-dimer, elevated - POA, likely due to trauma etc, but getting CT chest to rule out PE, LE doppler to rule out DVT     PCP: Liliane Browning DO    Consults: Cardiology and Neurology    Significant Diagnostic Studies: See Hospital Course    Discharged home in improved condition. Discharge Exam:  BP (!) 182/95 (BP 1 Location: Left upper arm, BP Patient Position: At rest)   Pulse (!) 58   Temp 98.5 °F (36.9 °C)   Resp 20   Ht 5' 4\" (1.626 m)   Wt 64 kg (141 lb 1.5 oz)   SpO2 99%   BMI 24.22 kg/m²      Gen:  Frail, ill appearing, in no acute distress  HEENT:  Pink conjunctivae, PERRL, hearing intact to voice, moist mucous membranes  Neck:  Supple, without masses, thyroid non-tender  Resp:  No accessory muscle use, clear breath sounds without wheezes rales or rhonchi  Card:  No murmurs, normal S1, S2 without thrills, bruits or peripheral edema  Abd:  Soft, non-tender, non-distended, normoactive bowel sounds are present, no mass  Lymph:  No cervical or inguinal adenopathy  Musc:  No cyanosis or clubbing  Skin:  No rashes or ulcers, skin turgor is reduced  Neuro:  Cranial nerves are grossly intact, general motor weakness, follows commands vaguely  Psych:  Poor insight, oriented to person    Patient Instructions:   Current Discharge Medication List      CONTINUE these medications which have NOT CHANGED    Details   diclofenac (VOLTAREN) 1 % gel Apply 2-4 g to affected area four (4) times daily as needed for Pain. lisinopriL (PRINIVIL, ZESTRIL) 2.5 mg tablet Take 2.5 mg by mouth daily. metFORMIN (GLUMETZA ER) 500 mg TG24 24 hour tablet Take 1 Tablet by mouth two (2) times a day. potassium chloride (K-DUR, KLOR-CON) 20 mEq tablet Take 20 mEq by mouth daily.       promethazine (PHENERGAN) 25 mg tablet Take 25 mg by mouth every six (6) hours as needed for Nausea. methIMAzole (TAPAZOLE) 5 mg tablet Take 2.5 mg by mouth daily. rosuvastatin (CRESTOR) 5 mg tablet Take 5 mg by mouth daily. PARoxetine (PAXIL) 40 mg tablet TAKE 1 TABLET BY MOUTH EVERY DAY  Qty: 90 Tab, Refills: 1    Associated Diagnoses: Moderate episode of recurrent major depressive disorder (HCC)           Activity: Activity as tolerated  Diet: Cardiac Diet, Diabetic Diet and Low fat, Low cholesterol  Wound Care: None needed    Follow-up with your PCP in a few weeks.   Follow-up tests/labs - none    Signed:  Joni King MD  9/4/2021  8:46 AM

## 2021-09-04 NOTE — PROGRESS NOTES
[facility]    Medical Progress Note      NAME: Lior Moran   :  1950  MRM:  [id number]    Date/Time of service 2021  8:40 AM          Assessment and Plan:     Acute metabolic encephalopathy / Altered mental status / Anxiety and depression / Mild tetrahydrocannabinol (THC) abuse / Cocaine abuse / Opiate abuse, episodic - POA, better. Multifactorial, DDx cocaine, THC, opiates, hypoxia, ammonia, dementia, other. Resumed paxil.  Aware does not show any opiate Rx. Neurology consulted. Unremarkable EEG and MRI. Acute hypoxemic respiratory failure - POA, unclear etiology. Suspect central suppression, and covid. Extubated . Continue oxygen as needed, wean to RA. CT chest without PE, PNA, edema or mass. Elevated LFTs / Hyperammonemia - POA due to shock liver, on top of chronic hepatitis. Roughly stable. Monitor as outpatient    Rhabdomyolysis / Elevated CK - POA, mild, due to being found down. CK now stable. Ensourage Hydration    Shock / Hx HTN (hypertension) - POA, unclear type. Most likely hypovolemic, vs covid septic, vs cardiogenic vs drugs vs other. Now better. Triturated off phenylephrine. Resume lisinopril. Cardiology consulted. Normal ECHO. Pneumonia due to COVID 19 / Leukocytopenia - Negative definitive testing. CT and Xray normal.  Negative xray. No Abx for now. NGTD on urine and blood cx. DANIA (acute kidney injury) / Hypernatremia / Elevated lactic acid level - POA, presumed due to IVVD. Much better after hydration. Monitor. Diabetes mellitus type 2, controlled - Diabetic diet and counseling. SSI per protocol. Holding home metformin. Check A1c.     Hepatitis C, chronic - Saw Dr. Balbir Bermeo and stopped treatment     Hyperthyroid - She takes methimazole, but TSH normal.  Holding methimazole      Primary osteoarthritis of both knees - Tylenol prn    Pure hypercholesterolemia - Check panel, hold crestor    Osteoporosis - Not on meds    D-dimer, elevated - POA, likely due to trauma etc, but getting CT chest to rule out PE, LE doppler to rule out DVT         Subjective:     Chief Complaint:  Better,wants to go home    ROS:  (bold if positive, if negative)    Tolerating PT  Tolerating diet        Objective:     Last 24hrs VS reviewed since prior progress note.  Most recent are:    Visit Vitals  BP (!) 182/95 (BP 1 Location: Left upper arm, BP Patient Position: At rest)   Pulse (!) 58   Temp 98.5 °F (36.9 °C)   Resp 20   Ht 5' 4\" (1.626 m)   Wt 64 kg (141 lb 1.5 oz)   SpO2 99%   BMI 24.22 kg/m²     SpO2 Readings from Last 6 Encounters:   09/04/21 99%   08/20/21 98%   11/01/17 96%   06/01/17 93%   01/24/15 97%   01/27/14 97%    O2 Flow Rate (L/min): 3 l/min       Intake/Output Summary (Last 24 hours) at 9/4/2021 0840  Last data filed at 9/4/2021 0429  Gross per 24 hour   Intake 1940 ml   Output 925 ml   Net 1015 ml        Physical Exam:    Gen:  Frail, ill appearing, in no acute distress  HEENT:  Pink conjunctivae, PERRL, hearing intact to voice, moist mucous membranes  Neck:  Supple, without masses, thyroid non-tender  Resp:  No accessory muscle use, clear breath sounds without wheezes rales or rhonchi  Card:  No murmurs, normal S1, S2 without thrills, bruits or peripheral edema  Abd:  Soft, non-tender, non-distended, normoactive bowel sounds are present, no mass  Lymph:  No cervical or inguinal adenopathy  Musc:  No cyanosis or clubbing  Skin:  No rashes or ulcers, skin turgor is reduced  Neuro:  Cranial nerves are grossly intact, general motor weakness, follows commands vaguely  Psych:  Poor insight, oriented to person    Telemetry reviewed:   normal sinus rhythm  __________________________________________________________________  Medications Reviewed: (see below)  Medications:     Current Facility-Administered Medications   Medication Dose Route Frequency    fentaNYL citrate (PF) injection 50 mcg  50 mcg IntraVENous Q1H PRN    albuterol-ipratropium (DUO-NEB) 2.5 MG-0.5 MG/3 ML  3 mL Nebulization Q6H PRN    glucose chewable tablet 16 g  4 Tablet Oral PRN    dextrose (D50W) injection syrg 12.5-25 g  25-50 mL IntraVENous PRN    glucagon (GLUCAGEN) injection 1 mg  1 mg IntraMUSCular PRN    insulin lispro (HUMALOG) injection   SubCUTAneous AC&HS    dexAMETHasone (DECADRON) tablet 6 mg  6 mg Oral DAILY    0.45% sodium chloride infusion  100 mL/hr IntraVENous CONTINUOUS    PARoxetine (PAXIL) tablet 40 mg  40 mg Oral DAILY    enoxaparin (LOVENOX) injection 40 mg  40 mg SubCUTAneous Q24H    hydrALAZINE (APRESOLINE) 20 mg/mL injection 10 mg  10 mg IntraVENous Q6H PRN        Lab Data Reviewed: (see below)  Lab Review:     Recent Labs     09/04/21 0427 09/03/21 0442 09/02/21 0346   WBC 10.5 11.2* 19.5*   HGB 13.7 12.3 13.9   HCT 40.9 36.1 43.9   * 124* 166     Recent Labs     09/04/21 0427 09/03/21 0442 09/02/21 0346 09/01/21  1747 09/01/21  1747    142 146*   < > 143   K 3.7 3.7 4.0   < > 5.0   * 111* 117*   < > 109*   CO2 30 28 24   < > 25   * 125* 138*   < > 223*   BUN 15 12 24*   < > 28*   CREA 0.58 0.39* 0.73   < > 1.16*   CA 8.8 8.5 7.8*   < > 8.6   MG 2.0 1.9  --   --  2.5*   PHOS 1.9* 1.8*  --   --   --    ALB 3.3* 2.9* 3.2*   < > 4.0   TBILI 0.8 0.7 0.6   < > 0.4   * 112* 99*   < > 77    < > = values in this interval not displayed. Lab Results   Component Value Date/Time    Glucose (POC) 139 (H) 09/04/2021 06:40 AM    Glucose (POC) 216 (H) 09/03/2021 10:47 PM    Glucose (POC) 183 (H) 09/03/2021 05:39 PM    Glucose (POC) 158 (H) 09/03/2021 11:15 AM    Glucose (POC) 160 (H) 09/03/2021 07:53 AM     Recent Labs     09/02/21  1108 09/02/21  0444 09/01/21  1940   PH 7.32* 7.26* 7.25*   PCO2 47* 51* 42   PO2 145* 122* 123*   HCO3 23 22 18*   FIO2 40 40 40     No results for input(s): INR, INREXT, INREXT in the last 72 hours.   All Micro Results     Procedure Component Value Units Date/Time    CULTURE, BLOOD, PAIRED [[id number]] Collected: 09/01/21 1901    Order Status: Completed Specimen: Blood Updated: 09/04/21 0622     Special Requests: NO SPECIAL REQUESTS        Culture result: NO GROWTH 3 DAYS       RESPIRATORY VIRUS PANEL W/COVID-19, PCR [[id number]] Collected: 09/03/21 1005    Order Status: Completed Specimen: Nasopharyngeal Updated: 09/03/21 1435     Adenovirus Not detected        Coronavirus 229E Not detected        Coronavirus HKU1 Not detected        Coronavirus CVNL63 Not detected        Coronavirus OC43 Not detected        SARS-CoV-2, PCR Not detected        Metapneumovirus Not detected        Rhinovirus and Enterovirus Not detected        Influenza A Not detected        Influenza A, subtype H1 Not detected        Influenza A, subtype H3 Not detected        INFLUENZA A H1N1 PCR Not detected        Influenza B Not detected        Parainfluenza 1 Not detected        Parainfluenza 2 Not detected        Parainfluenza 3 Not detected        Parainfluenza virus 4 Not detected        RSV by PCR Not detected        B. parapertussis, PCR Not detected        Bordetella pertussis - PCR Not detected        Chlamydophila pneumoniae DNA, QL, PCR Not detected        Mycoplasma pneumoniae DNA, QL, PCR Not detected       CULTURE, URINE [[id number]] Collected: 09/01/21 1901    Order Status: Completed Specimen: Cath Urine Updated: 09/03/21 0016     Special Requests: NO SPECIAL REQUESTS        Culture result: No growth (<1,000 CFU/ML)       CULTURE, BLOOD [[id number]] Collected: 09/01/21 1945    Order Status: Canceled Specimen: Blood     COVID-19 RAPID TEST [[id number]]  (Abnormal) Collected: 09/01/21 1901    Order Status: Completed Specimen: Nasopharyngeal Updated: 09/01/21 1939     Specimen source Nasopharyngeal        COVID-19 rapid test Detected        Comment: Rapid Abbott ID Now       The specimen is POSITIVE for SARS-CoV-2, the novel coronavirus associated with COVID-19.        This test has been authorized by the FDA under an Emergency Use Authorization (EUA) for use by authorized laboratories. Fact sheet for Healthcare Providers: iTendency.uy  Fact sheet for Patients: iTendency.uy       Methodology: Isothermal Nucleic Acid Amplification  CALLED TO AND READ BACK BY  MANUELA MICHAEL AT 1937/JS         CULTURE, BLOOD, PAIRED [[id number]] Collected: 09/01/21 1901    Order Status: Canceled Specimen: Blood           Other pertinent lab: none    Total time spent with patient: 30 Minutes I personally reviewed chart, notes, data and current medications in the medical record. I have personally examined and treated the patient at bedside during this period.                  Care Plan discussed with: Patient, Care Manager, Nursing Staff, Consultant/Specialist and >50% of time spent in counseling and coordination of care    Discussed:  Care Plan and D/C Planning    Prophylaxis:  Lovenox and H2B/PPI    Disposition:  Home w/Family           ___________________________________________________    Attending Physician: Valentina Plascencia MD

## 2021-09-04 NOTE — DISCHARGE INSTRUCTIONS
Patient Discharge Instructions    Whit Dalal / [id number] : 1950    Admitted 2021 Discharged: 2021     Primary Diagnoses  Problem List as of 2021 Date Reviewed: 2021         Acute metabolic encephalopathy   Septic shock (HCC)   Leukocytopenia   Elevated lactic acid level   D-dimer, elevated   Hypernatremia   DANIA (acute kidney injury) ([facility]   Elevated LFTs   Elevated CK   Rhabdomyolysis   Hyperammonemia (HCC)   Mild tetrahydrocannabinol (THC) abuse   Cocaine abuse ([facility]   Opiate abuse, episodic (HCC)   Acute hypoxemic respiratory failure (HCC)   Altered mental status   HTN (hypertension)   Osteoporosis   Pure hypercholesterolemia   Primary osteoarthritis of both knees   Hepatitis C, chronic (HCC)   Anxiety and depression   Diabetes mellitus type 2, controlled ([facility]          Take Home Medications     · It is important that you take the medication exactly as they are prescribed. · Keep your medication in the bottles provided by the pharmacist and keep a list of the medication names, dosages, and times to be taken in your wallet. · Do not take other medications without consulting your doctor. What to do at Home    Recommended diet: Cardiac Diet, Diabetic Diet and Low fat, Low cholesterol    Recommended activity: Activity as tolerated    If you experience worse symptoms, please follow up with your PCP. Follow-up with your PCP in a few weeks    Patient Education        Use of Multiple Drugs: Care Instructions  Your Care Instructions     You have had treatment to help your body get rid of a combination of any of these drugs:  · Prescription medicines  · Over-the-counter medicines  · Alcohol  · Illegal drugs  Taking some drugs together may cause a bad reaction. They can have unexpected or stronger effects on your body and mind. For example, benzodiazepines (such as alprazolam and lorazepam) and alcohol both depress the nervous system.  Taken together, each one is stronger than when it is taken by itself. You are getting better, but it takes time for the drugs to leave your body. It may take up to 2 weeks for your mind to clear and your mood to improve. Depending on the drugs you took, the doctor might have:  · Watched your symptoms or done tests to find out what drugs were in your body. · Treated you to control your breathing, blood pressure, and heart rate. · Tried to remove the drugs from your body by pumping your stomach or giving you a substance by mouth that absorbs chemicals. · Given you a substance that neutralizes chemicals (antidote). · Given you oxygen to help you breathe. · Given you fluids. The doctor also watched you carefully to make sure you were recovering safely. Follow-up care is a key part of your treatment and safety. Be sure to make and go to all appointments, and call your doctor if you are having problems. It's also a good idea to know your test results and keep a list of the medicines you take. How can you care for yourself at home? · Adopt healthy habits to ease withdrawal symptoms. When you use substances like alcohol and some drugs regularly, your body gets used to them. This is called physical dependence. If you are physically dependent on substances, you may have withdrawal symptoms when you stop taking them. These symptoms may include trembling, feeling restless, and sweating. To help get past these:  ? Get plenty of rest.  ? Drink lots of fluids. ? Stay active. ? Eat a healthy diet. · Drink fluids to soothe a sore throat. If you had a tube in your throat to help you breathe, you may have a sore throat or hoarseness that can last a few days. Drinking fluids may help. · If you use opioids, ask your doctor or pharmacist about having a naloxone rescue kit on hand. · Get help to stop using drugs. Talk to your doctor about substance use treatment programs. When should you call for help? Call 911 anytime you think you may need emergency care.  For example, call if:    · You feel you cannot stop from hurting yourself or someone else. Call your doctor now or seek immediate medical care if:    · You have new or worse symptoms of withdrawal, such as trembling, feeling restless, and sweating, that you can't manage at home. Watch closely for changes in your health, and be sure to contact your doctor if:    · You do not get better as expected.     · You need help finding the right place to get help with drug or alcohol problems. Where can you learn more? Go to http://www.gray.com/  Enter B109 in the search box to learn more about \"Use of Multiple Drugs: Care Instructions. \"  Current as of: June 29, 2020               Content Version: 12.8  © 0222-6334 [employer]. Care instructions adapted under license by [employer] (which disclaims liability or warranty for this information). If you have questions about a medical condition or this instruction, always ask your healthcare professional. [place] [address] any warranty or liability for your use of this information. Information obtained by :  I understand that if any problems occur once I am at home I am to contact my physician. I understand and acknowledge receipt of the instructions indicated above.                                                                                                                                            Physician's or R.N.'s Signature                                                                  Date/Time                                                                                                                                              Patient or Representative Signature                                                          Date/Time reviewed

## 2021-09-04 NOTE — ACP (ADVANCE CARE PLANNING)
Advance Care Planning   Advance Care Planning Inpatient Note  [address] [facility]    Today's Date: 9/4/2021  Unit: [facility] 4M POST SURG ORT 2    Received request from Consult. Upon review of chart and communication with care team, patient's decision making abilities are not in question. Patient is going to be discharged, consulte with Case Management who is in conversation about decision maker with Patiend     Goals of ACP Conversation:  Discuss Advance Care planning documents    Health Care Decision Makers:    No healthcare decision makers have been documented. Click here to complete [address] including selection of the Healthcare Decision Maker Relationship (ie \"Primary\")    Summary:  No Decision Maker named by patient at this time    Advance Care Planning Documents (Patient Wishes) on file:  None     Assessment:    Consulted with Case Management who has begun conversation about decision maker. Case Management will contact  for assistance if needed.     Interventions:  Case Management will contact  if assitance is needed for AMD.    Care Preferences Communicated:  No    Outcomes/Plan:   Case Management will contact  if assitance is needed for AMD.    Yves RenteriaBluefield Regional Medical Center on 9/4/2021 at 9:22 AM

## 2021-09-04 NOTE — PROGRESS NOTES
1900- Bedside and Verbal shift change report received from Kiara Luke, [facility] (offgoing nurse) by Sabino Arnett RN (oncoming nurse). Report included the following information SBAR, Kardex, MAR, Recent Results, Med Rec Status, Cardiac Rhythm NSR and Alarm Parameters . Primary Nurse Sabino Arnett RN and Kiara Luke RN performed a dual skin assessment on this patient No impairment noted. All drips verified. 2000- Patient shift assessment performed and documented in flowsheet. Patient turned and resting comfortably. Patient educated on call bell and patient safety measures. Call bell in reach, bed in low and locked position, and reminded patient to use call bell. Patient board updated and care plan discussed with patient. 2200-Patient resting comfortably. 0000- Reassessment performed. No changes noted. Patient resting comfortably. 0200- Patient resting comfortably. 0400-TRANSFER - OUT REPORT:    Verbal report given to Nella(name) stephie Shoemaker  being transferred to 4th Floor(unit) for routine progression of care       Report consisted of patients Situation, Background, Assessment and   Recommendations(SBAR). Information from the following report(s) SBAR, Kardex, MAR, Recent Results, Med Rec Status, Cardiac Rhythm NSR and Alarm Parameters  was reviewed with the receiving nurse. Lines:   Peripheral IV 09/01/21 Left External jugular (Active)   Site Assessment Clean, dry, & intact 09/04/21 0000   Phlebitis Assessment 0 09/04/21 0000   Infiltration Assessment 0 09/04/21 0000   Dressing Status Clean, dry, & intact 09/04/21 0000   Dressing Type Transparent;Tape 09/04/21 0000   Hub Color/Line Status Capped; Patent; Flushed;End cap changed 09/04/21 0000   Action Taken Open ports on tubing capped 09/04/21 0000   Alcohol Cap Used Yes 09/04/21 0000       Peripheral IV 09/01/21 Right Antecubital (Active)   Site Assessment Clean, dry, & intact 09/04/21 0000   Phlebitis Assessment 0 09/04/21 0000   Infiltration Assessment 0 09/04/21 0000   Dressing Status Clean, dry, & intact 09/04/21 0000   Dressing Type Transparent 09/04/21 0000   Hub Color/Line Status Patent; Flushed; Infusing 09/04/21 0000   Action Taken Open ports on tubing capped 09/04/21 0000   Alcohol Cap Used Yes 09/04/21 0000       Peripheral IV 09/01/21 Left Antecubital (Active)   Site Assessment Clean, dry, & intact 09/04/21 0000   Phlebitis Assessment 0 09/04/21 0000   Infiltration Assessment 0 09/04/21 0000   Dressing Status Clean, dry, & intact 09/04/21 0000   Dressing Type Transparent 09/04/21 0000   Hub Color/Line Status Patent; Flushed;Capped; End cap changed 09/04/21 0000   Action Taken Open ports on tubing capped 09/04/21 0000   Alcohol Cap Used Yes 09/04/21 0000        Opportunity for questions and clarification was provided.       Patient transported with:    Registered Nurse    Nancy Umanzor RN

## 2021-09-04 NOTE — PROGRESS NOTES
Spiritual Care Assessment/Progress Note  [address]      NAME: Rufino Calvillo      MRN: [id number]  AGE: 69 y.o. SEX: female  Synagogue Affiliation: Orthodoxy   Language: English     9/4/2021     Total Time (in minutes): 18     Spiritual Assessment begun in [facility] POST SURG ORT 2 through conversation with:         []Patient        [] Family    [] Friend(s)        Reason for Consult: Advance medical directive consult     Spiritual beliefs: (Please include comment if needed)     [] Identifies with a elena tradition:         [] Supported by a elena community:            [] Claims no spiritual orientation:           [] Seeking spiritual identity:                [] Adheres to an individual form of spirituality:           [x] Not able to assess:                           Identified resources for coping:      [] Prayer                               [] Music                  [] Guided Imagery     [] Family/friends                 [] Pet visits     [] Devotional reading                         [x] Unknown     [] Other:                                          Interventions offered during this visit: (See comments for more details)    Patient Interventions: Advance medical directive consult (Attempted)           Plan of Care:     [] Support spiritual and/or cultural needs    [] Support AMD and/or advance care planning process      [] Support grieving process   [] Coordinate Rites and/or Rituals    [] Coordination with community clergy   [] No spiritual needs identified at this time   [] Detailed Plan of Care below (See Comments)  [] Make referral to Music Therapy  [] Make referral to Pet Therapy     [] Make referral to Addiction services  [] Make referral to [facility]  [] Make referral to Spiritual Care Partner  [] No future visits requested        [x] Follow up upon further referrals     Comments: Follow up attempted for Advance Medical Directive consult on 4 Post Surg.  Consulted with Case Management who has begun conversation about decision maker. Case Management will contact  for assistance if needed.   Visited by: Anthony Lema, MS., [address] [facility] [place] ([id number])

## 2021-09-04 NOTE — PROGRESS NOTES
Problem: Falls - Risk of  Goal: *Absence of Falls  Description: Document Clearence Skates Fall Risk and appropriate interventions in the flowsheet. 9/4/2021 1057 by Gilda Perez  Outcome: Resolved/Met  9/4/2021 1057 by Gilda Perez  Outcome: Resolved/Not Met  Note: Fall Risk Interventions:       Mentation Interventions: Toileting rounds    Medication Interventions: Patient to call before getting OOB, Teach patient to arise slowly    Elimination Interventions: Call light in reach    History of Falls Interventions: Room close to nurse's station         Problem: Pressure Injury - Risk of  Goal: *Prevention of pressure injury  Description: Document Enrrique Scale and appropriate interventions in the flowsheet. 9/4/2021 1057 by Gilda Perez  Outcome: Resolved/Met  9/4/2021 1057 by Gilda Perez  Outcome: Resolved/Not Met     Problem: Risk for Spread of Infection  Goal: Prevent transmission of infectious organism to others  Description: Prevent the transmission of infectious organisms to other patients, staff members, and visitors.   9/4/2021 1057 by Gilda Perez  Outcome: Resolved/Met  9/4/2021 1057 by Gilda Perez  Outcome: Resolved/Not Met

## 2021-09-06 LAB
BACTERIA SPEC CULT: NORMAL
SERVICE CMNT-IMP: NORMAL

## 2021-09-08 ENCOUNTER — PATIENT OUTREACH (OUTPATIENT)
Dept: CASE MANAGEMENT | Age: 71
End: 2021-09-08

## 2021-09-08 RX ORDER — CETIRIZINE HCL 10 MG
10 TABLET ORAL DAILY
COMMUNITY
Start: 2020-12-14 | End: 2022-05-12

## 2021-09-08 NOTE — PROGRESS NOTES
Patient contacted regarding COVID-19 risk, exposure, diagnosis, pulse oximeter ordered at discharge, monoclonal antibody infusion follow up. Discussed COVID-19 related testing which was available at this time. Test results were positive. Patient informed of results, if available? yes. Ambulatory Care Manager contacted the patient by telephone to perform post discharge assessment. Call within 2 business days of discharge: Yes Verified name and  with patient as identifiers. Provided introduction to self, and explanation of the CTN/ACM role, and reason for call due to risk factors for infection and/or exposure to COVID-19. Symptoms reviewed with patient who verbalized the following symptoms: fatigue and no worsening symptoms      Due to no new or worsening symptoms encounter was not routed to provider for escalation. Discussed follow-up appointments. If no appointment was previously scheduled, appointment scheduling offered:  no. [facility] follow up appointment(s): No future appointments. [facility] follow up appointment(s): [address] Arnold scott NP    Interventions to address risk factors: Education of patient/family/caregiver/guardian to support self-management-self management     Advance Care Planning:   Does patient have an Advance Directive: not discussed during this call. Educated patient about risk for severe COVID-19 due to risk factors according to CDC guidelines. ACM reviewed discharge instructions, medical action plan and red flag symptoms with the patient who verbalized understanding. Discussed COVID vaccination status: yes. Education provided on COVID-19 vaccination as appropriate. Discussed exposure protocols and quarantine with CDC Guidelines. Patient was given an opportunity to verbalize any questions and concerns and agrees to contact ACM or health care provider for questions related to their healthcare.     Reviewed and educated patient on any new and changed medications related to discharge diagnosis     Was patient discharged with a pulse oximeter? no Discussed and confirmed pulse oximeter discharge instructions and when to notify provider or seek emergency care. ACM provided contact information. No further follow-up call identified based on severity of symptoms and risk factors.

## 2021-09-13 ENCOUNTER — TELEPHONE (OUTPATIENT)
Dept: HEMATOLOGY | Age: 71
End: 2021-09-13

## 2021-09-13 NOTE — TELEPHONE ENCOUNTER
----- Message from Simon Valderrama MD sent at 9/12/2021  9:32 AM EDT -----  Regarding: She did not get labs when she left office  She is now at [facility] looks like she may have had CVA. Wait till discharge call and get HCV RNA. STEVE          [email or web address] Patient at current time inpatient at [facility]. Will wait until discharge to get HCV RNA testing done. (NATAN)      [email or web address] Spoke w/patient this morning. Patient is out of the hospital doing better each day. Patient request lab work be faxed to Lynnette Gonsales NP (PCP office). Faxed to [phone] and office number [phone].    [email or web address] Attempt to reach patient concerning lab work that was sent to PCP last week waiting results. Trying to reach patient to see if she went to have labs drawn. (NATAN)    [email or web address] Rec'd lab results will scan in the [facility] system. (NATAN)

## 2021-09-17 ENCOUNTER — PATIENT OUTREACH (OUTPATIENT)
Dept: CASE MANAGEMENT | Age: 71
End: 2021-09-17

## 2021-09-17 NOTE — PROGRESS NOTES
Patient resolved from [address] Transitions episode on 9/17/21. Discussed COVID-19 related testing which was available at this time. Test results were positive. Patient informed of results, if available? yes     Patient/family has been provided the following resources and education related to COVID-19:                         Signs, symptoms and red flags related to COVID-19            CDC exposure and quarantine guidelines            [employer] exposure contact - [phone]            Contact for their local Department of Health                 No further outreach scheduled with this CTN/ACM/LPN/HC/ MA. Episode of Care resolved. Patient has this CTN/ACM/LPN/HC/MA contact information if future needs arise.

## 2021-09-20 DIAGNOSIS — B18.2 CHRONIC HEPATITIS C WITHOUT HEPATIC COMA (HCC): Primary | ICD-10-CM

## 2021-10-19 ENCOUNTER — TELEPHONE (OUTPATIENT)
Dept: HEMATOLOGY | Age: 71
End: 2021-10-19

## 2021-10-19 NOTE — TELEPHONE ENCOUNTER
----- Message from Jose Oconnor MD sent at 10/17/2021  5:15 AM EDT -----  Regarding: Call and tell her we finally got HCVRNA result. HCV RNA was positive. However, Fibroscan I did if office shows mild liver injury and nothong to worry about. Make her appt to see NP who will arrange for HCV treatment.   TIMA

## 2021-10-19 NOTE — TELEPHONE ENCOUNTER
----- Message from Lisa Perry MD sent at 10/17/2021  5:15 AM EDT -----  Regarding: Call and tell her we finally got HCVRNA result. HCV RNA was positive. However, Fibroscan I did if office shows mild liver injury and nothong to worry about. Make her appt to see NP who will arrange for HCV treatment. [email or web address] Attempt to call patient with date/time of follow up appt. No answer left detail voice message with follow up appt 12/3/21@ 1pm in our office with SEDA Sol.(KF)---1205 Spoke w/patient given the update on next follow up appt and the above information from Roxanne. Patient verbalize understanding. (KF)

## 2021-12-03 ENCOUNTER — OFFICE VISIT (OUTPATIENT)
Dept: HEMATOLOGY | Age: 71
End: 2021-12-03
Payer: MEDICAID

## 2021-12-03 VITALS
WEIGHT: 139 LBS | BODY MASS INDEX: 23.73 KG/M2 | HEIGHT: 64 IN | RESPIRATION RATE: 18 BRPM | TEMPERATURE: 97.5 F | DIASTOLIC BLOOD PRESSURE: 81 MMHG | HEART RATE: 80 BPM | SYSTOLIC BLOOD PRESSURE: 146 MMHG

## 2021-12-03 DIAGNOSIS — L08.9 SKIN INFECTION: ICD-10-CM

## 2021-12-03 DIAGNOSIS — B18.2 CHRONIC HEPATITIS C WITHOUT HEPATIC COMA (HCC): Primary | ICD-10-CM

## 2021-12-03 PROCEDURE — G8753 SYS BP > OR = 140: HCPCS | Performed by: NURSE PRACTITIONER

## 2021-12-03 PROCEDURE — 3017F COLORECTAL CA SCREEN DOC REV: CPT | Performed by: NURSE PRACTITIONER

## 2021-12-03 PROCEDURE — 1101F PT FALLS ASSESS-DOCD LE1/YR: CPT | Performed by: NURSE PRACTITIONER

## 2021-12-03 PROCEDURE — G8420 CALC BMI NORM PARAMETERS: HCPCS | Performed by: NURSE PRACTITIONER

## 2021-12-03 PROCEDURE — G9717 DOC PT DX DEP/BP F/U NT REQ: HCPCS | Performed by: NURSE PRACTITIONER

## 2021-12-03 PROCEDURE — G8536 NO DOC ELDER MAL SCRN: HCPCS | Performed by: NURSE PRACTITIONER

## 2021-12-03 PROCEDURE — G8427 DOCREV CUR MEDS BY ELIG CLIN: HCPCS | Performed by: NURSE PRACTITIONER

## 2021-12-03 PROCEDURE — G8754 DIAS BP LESS 90: HCPCS | Performed by: NURSE PRACTITIONER

## 2021-12-03 PROCEDURE — 1090F PRES/ABSN URINE INCON ASSESS: CPT | Performed by: NURSE PRACTITIONER

## 2021-12-03 PROCEDURE — 99214 OFFICE O/P EST MOD 30 MIN: CPT | Performed by: NURSE PRACTITIONER

## 2021-12-03 RX ORDER — MUPIROCIN 20 MG/G
OINTMENT TOPICAL DAILY
Qty: 22 G | Refills: 0 | Status: SHIPPED | OUTPATIENT
Start: 2021-12-03 | End: 2022-05-12

## 2021-12-03 NOTE — PROGRESS NOTES
[address] [facility]      Destiny Overton MD, Sarah Michel, Dulce Cunningham MD, MPH      ARMANDO Brasher-MIGUEL Bliss, ACN-BC     Sweta Novoa, [facility]   [facility]    Rose Melgar, [facility]       Radha Kellogg Moshe De Ortiz 136    at [address], [address] [place] [address], [facility] 22. [phone]    FAX: [address] [place] [address], [address]    [phone]    FAX: [phone]       Patient Care Team:  Polo Ramos DO as PCP - General (Family Medicine)  Polo Ramos DO as PCP - [facility] Provider  Mickie Paredes MD (Hepatology)      Problem List  Date Reviewed: 8/20/2021          Codes Class Noted    Acute metabolic encephalopathy KFP-33-SD: G93.41  ICD-9-CM: 348.31  9/2/2021        Septic shock ([facility] ICD-10-CM: A41.9, R65.21  ICD-9-CM: 038.9, 785.52, 995.92  9/2/2021        Leukocytopenia ICD-10-CM: D72.819  ICD-9-CM: 288.50  9/2/2021        Elevated lactic acid level ICD-10-CM: R79.89  ICD-9-CM: 276.2  9/2/2021        D-dimer, elevated ICD-10-CM: R79.89  ICD-9-CM: 790.92  9/2/2021        Hypernatremia ICD-10-CM: E87.0  ICD-9-CM: 276.0  9/2/2021        DANIA (acute kidney injury) ([facility] ICD-10-CM: N17.9  ICD-9-CM: 584.9  9/2/2021        Elevated LFTs ICD-10-CM: R79.89  ICD-9-CM: 790.6  9/2/2021        Elevated CK ICD-10-CM: R74.8  ICD-9-CM: 790.5  9/2/2021        Rhabdomyolysis ICD-10-CM: M62.82  ICD-9-CM: 728.88  9/2/2021        Hyperammonemia ([facility] ICD-10-CM: E72.20  ICD-9-CM: 270.6  9/2/2021        Mild tetrahydrocannabinol (THC) abuse ICD-10-CM: F12.10  ICD-9-CM: 305.20  9/2/2021        Cocaine abuse ([facility] ICD-10-CM: F14.10  ICD-9-CM: 305.60  9/2/2021        Opiate abuse, episodic (HCC) ICD-10-CM: F11.10  ICD-9-CM: 305.52  9/2/2021        Acute hypoxemic respiratory failure ([facility].) ICD-10-CM: J96.01  ICD-9-CM: 518.81  9/2/2021        Altered mental status ICD-10-CM: R41.82  ICD-9-CM: 780.97  9/1/2021        HTN (hypertension) ICD-10-CM: I10  ICD-9-CM: 401.9  9/7/2016        Osteoporosis ICD-10-CM: M81.0  ICD-9-CM: 733.00  7/7/2016        Pure hypercholesterolemia ICD-10-CM: E78.00  ICD-9-CM: 272.0  5/16/2016        Primary osteoarthritis of both knees ICD-10-CM: M17.0  ICD-9-CM: 715.16  8/24/2015    Overview Signed 11/12/2018  5:24 PM by Delfino Garcia MD     Seen by Dr. Samra Hanna 11/2018 - planned for TKA in Jan 2019             Hepatitis C, chronic [facility]) ICD-10-CM: B18.2  ICD-9-CM: 070.54  1/6/2014    Overview Signed 1/6/2014  1:44 PM by Tia Gong in past.  Had side effects from treatment and had to stop. Anxiety and depression ICD-10-CM: F41.9, F32. A  ICD-9-CM: 300.00, 311  4/16/2013        Diabetes mellitus type 2, controlled ([facility].) ICD-10-CM: E11.9  ICD-9-CM: 250.00  4/16/2013            Megha Sandhu is being seen at [address] for management of chronic HCV. The active problem list, all pertinent past medical history, medications, radiologic findings and laboratory findings related to the liver disorder were reviewed and discussed with the patient. The patient is a 69 y.o.  female who was found to have abnormalities in liver chemistries and subsequently tested positive for chronic HCV in 1990s. She was treated with peginterferon and ribavirin in the early 2000s. The patient achieved a SVR/cure. The liver enzymes elevated 9/2021 and the HCV RNA is now positive. Assessment of liver fibrosis with Fibroscan 8/2021. The result was 6.3 kPa which correlates with stage 1 portal fibrosis. The CAP score of 247 suggests there is no hepatic steatosis.     The patient has the following symptoms which could be attributed to the liver disorder: Fatigue, and pain in the right side over the liver. The patient is not experiencing the following symptoms which are commonly seen in this liver disorder: problems concentrating, swelling of the abdomen, swelling of the lower extremities, hematemesis, or hematochezia. The patient completes all daily activities without any functional limitations. ASSESSMENT AND PLAN:  Chronic HCV   Chronic HCV with stage 1 fibrosis. Severity of the liver disease was assessed by Fibroscan in 8/2021. Assessment of liver fibrosis was performed with Fibroscan 8/2021. The result was 6.3 kPa which correlates with stage 1 portal fibrosis. Have performed laboratory testing to monitor liver function and degree of liver injury. This included BMP, hepatic panel, and CBC with platelet count. Laboratory testing from 9/04/2021 reviewed in detail. Follow-up testing ordered today. The liver transaminases and ALP are elevated. The liver function is normal. The platelet count is depressed. Chronic HCV Treatment  The patient was previously treated for HCV with peginterferon and ribavirin in early 2000's. The previous treatment response was sustained virologic response/cure. It is not clear if she had re-exposure or a response then relapse. The patient has HCV genotype that is not yet defined. This was ordered today and is pending. The patient should be treated with Epclusa (sofosbuvir and velpatasvir) for 12 weeks. Medication teaching completed including side effects and interactions. Advise she hold crestor while on treatment. Medicaid treatment agreement reviewed in detail, all questions answered and signed by the patient. Screening for Hepatocellular Carcinoma  HCC screening is not necessary since the patient has no evidence of cirrhosis.     Treatment of other medical problems in patients with chronic liver disease  There are no contraindications for the patient to take most medications that are necessary for treatment of other medical issues. Counseling for alcohol in patients with chronic liver disease  The patient was counseled regarding alcohol consumption and the effect of alcohol on chronic liver disease. The patient does not consume any significant amount of alcohol. Vaccinations   Routine vaccinations against other bacterial and viral agents can be performed as indicated. Annual flu vaccination should be administered if indicated. ALLERGIES  Allergies   Allergen Reactions    Amoxicillin Nausea and Vomiting    Penicillin G Nausea and Vomiting     Pt can't remember that reaction it caused. MEDICATIONS  Current Outpatient Medications   Medication Sig    sofosbuvir-velpatasvir (Epclusa) 400-100 mg tablet Take 1 Tablet by mouth daily.  mupirocin (BACTROBAN) 2 % ointment Apply  to affected area daily.  cetirizine (ZYRTEC) 10 mg tablet Take 10 mg by mouth daily.  diclofenac (VOLTAREN) 1 % gel Apply 2-4 g to affected area four (4) times daily as needed for Pain.  lisinopriL (PRINIVIL, ZESTRIL) 2.5 mg tablet Take 2.5 mg by mouth daily.  metFORMIN (GLUMETZA ER) 500 mg TG24 24 hour tablet Take 1 Tablet by mouth two (2) times a day.  methIMAzole (TAPAZOLE) 5 mg tablet Take 2.5 mg by mouth daily.  rosuvastatin (CRESTOR) 5 mg tablet Take 5 mg by mouth daily.  PARoxetine (PAXIL) 40 mg tablet TAKE 1 TABLET BY MOUTH EVERY DAY     No current facility-administered medications for this visit. SYSTEM REVIEW NOT RELATED TO LIVER DISEASE OR REVIEWED ABOVE:  Constitution systems: Negative for fever, chills, weight gain, weight loss. Eyes: Negative for visual changes. ENT: Negative for sore throat, painful swallowing. Respiratory: Negative for cough, hemoptysis, SOB. Cardiology: Negative for chest pain, palpitations. GI:  Negative for constipation or diarrhea. : Negative for urinary frequency, dysuria, hematuria, nocturia. Skin: Negative for rash.   Hematology: Negative for easy bruising, blood clots. Musculo-skeletal: Negative for back pain, muscle pain, weakness. Neurologic: Negative for headaches, dizziness, vertigo, memory problems not related to HE. Psychology: Negative for anxiety, depression. FAMILY HISTORY:  The patient has no knowledge of the father's medical condition. The mother  of cancer. There is no family history of liver disease. SOCIAL HISTORY:  The patient is . She has not seen spouse in 21 years. The patient has 1 child and 2 grandchildren. The patient currently smokes 1/2 pack of tobacco daily. The patient has never consumed significant amounts of alcohol. The patient has never worked. PHYSICAL EXAMINATION:  Visit Vitals  BP (!) 146/81   Pulse 80   Temp 97.5 °F (36.4 °C) (Oral)   Resp 18   Ht 5' 4\" (1.626 m)   Wt 139 lb (63 kg)   BMI 23.86 kg/m²       General: No acute distress. Eyes: Sclera anicteric. ENT: No oral lesions. Thyroid normal.  Nodes: No adenopathy. Skin: No spider angiomata. No jaundice. No palmar erythema. Respiratory: Lungs clear to auscultation. Cardiovascular: Regular heart rate. No murmurs. No JVD. Abdomen: Soft non-tender, liver size normal to percussion/palpation. Spleen not palpable. No obvious ascites. Extremities: No edema. No muscle wasting. No gross arthritic changes. Neurologic: Alert and oriented. Cranial nerves grossly intact. No asterixis.     LABORATORY STUDIES:  [facility] [address] & Units 2021 9/3/2021 2021   WBC 3.6 - 11.0 K/uL 10.5 11.2 (H) 19.5 (H)   ANC 1.8 - 8.0 K/UL   17.0 (H)   HGB 11.5 - 16.0 g/dL 13.7 12.3 13.9    - 400 K/uL 148 (L) 124 (L) 166   AST 15 - 37 U/L 200 (H) 204 (H) 174 (H)   ALT 12 - 78 U/L 128 (H) 112 (H) 99 (H)   Alk Phos 45 - 117 U/L 75 65 84   Bili, Total 0.2 - 1.0 MG/DL 0.8 0.7 0.6   Albumin 3.5 - 5.0 g/dL 3.3 (L) 2.9 (L) 3.2 (L)   BUN 6 - 20 MG/DL 15 12 24 (H)   Creat 0.55 - 1.02 MG/DL 0. 58 0.39 (L) 0.73   Na 136 - 145 mmol/L 143 142 146 (H)   K 3.5 - 5.1 mmol/L 3.7 3.7 4.0   Cl 97 - 108 mmol/L 109 (H) 111 (H) 117 (H)   CO2 21 - 32 mmol/L 30 28 24   Glucose 65 - 100 mg/dL 117 (H) 125 (H) 138 (H)   Magnesium 1.6 - 2.4 mg/dL 2.0 1.9      Laboratory testing from 9/04/2021 reviewed in detail. Additional testing included to evaluate progression or regression of disease. SEROLOGIES:  Serologies Latest Ref Rng & Units 12/3/2021   Hep B Core Ab, Total Negative Negative   Hep B Surface AB QL  Reactive     9/23/2021. HCV RNA [id number]    Additional serologies pending. LIVER HISTOLOGY:  8/2021. FibroScan performed at [address]. EkPa was 6.3. IQR/med 11%. . The results suggested a fibrosis level of F1. The CAP score suggests there is no significant hepatic steatosis. ENDOSCOPIC PROCEDURES:  Not available or performed    RADIOLOGY:  11/2017. Ultrasound of liver. Echogenic consistent with chronic liver disease. No liver mass lesions. No dilated bile ducts. No ascites. OTHER TESTING:  Not available or performed    FOLLOW-UP:  All of the issues listed above in the Assessment and Plan were discussed with the patient. All questions were answered. The patient expressed a clear understanding of the above. [address] [address] 4 weeks after staring hepatitis C medication. April ROXANNE [facility] of [address] [address] [facility], [address] [facility], [place]  22.  [address] [facility]

## 2021-12-03 NOTE — PROGRESS NOTES
1. Have you been to the ER, urgent care clinic since your last visit? Hospitalized since your last visit? no    2. Have you seen or consulted any other health care providers outside of the [address] [place] [address] since your last visit? Include any pap smears or colon screening.  no

## 2021-12-04 LAB
HBV CORE AB SERPL QL IA: NEGATIVE
HBV SURFACE AB SER QL: REACTIVE
HIV 1+2 AB+HIV1 P24 AG SERPL QL IA: NON REACTIVE

## 2021-12-06 LAB
HCV RNA SERPL NAA+PROBE-ACNC: NORMAL IU/ML
HCV RNA SERPL NAA+PROBE-LOG IU: 6.42 LOG10 IU/ML
HCV RNA SERPL QL NAA+PROBE: POSITIVE
TEST INFORMATION: NORMAL

## 2021-12-06 RX ORDER — VELPATASVIR AND SOFOSBUVIR 100; 400 MG/1; MG/1
1 TABLET, FILM COATED ORAL DAILY
Qty: 28 TABLET | Refills: 2 | Status: SHIPPED | OUTPATIENT
Start: 2021-12-06 | End: 2022-05-12

## 2021-12-07 LAB
HCV GENTYP SERPL NAA+PROBE: NORMAL
PLEASE NOTE, 550474: NORMAL

## 2022-02-09 ENCOUNTER — TELEPHONE (OUTPATIENT)
Dept: INTERNAL MEDICINE CLINIC | Age: 72
End: 2022-02-09

## 2022-02-09 NOTE — TELEPHONE ENCOUNTER
----- Message from Guy Shaver sent at 2/9/2022 11:21 AM EST -----  Subject: Message to Provider    QUESTIONS  Information for Provider? New Patient, Establish Care, Previous provider   no longer available, Maciej Lemus, need new PCP  ---------------------------------------------------------------------------  --------------  CALL BACK INFO  What is the best way for the office to contact you? OK to leave message on   voicemail  Preferred Call Back Phone Number? [phone]  ---------------------------------------------------------------------------  --------------  SCRIPT ANSWERS  Relationship to Patient?  Self

## 2022-02-09 NOTE — TELEPHONE ENCOUNTER
PSR reached out to patient to inform of new patient appointments - we did set one up with Dr Ish Hernandez for 05/12 at 945 AM

## 2022-02-10 ENCOUNTER — TELEPHONE (OUTPATIENT)
Dept: INTERNAL MEDICINE CLINIC | Age: 72
End: 2022-02-10

## 2022-02-10 NOTE — TELEPHONE ENCOUNTER
No sooner appts available. Patient can call weekly to check for cancelled NP appt for sooner appointment.

## 2022-02-10 NOTE — TELEPHONE ENCOUNTER
----- Message from Augie Counter sent at 2/9/2022  1:37 PM EST -----  Subject: Message to Provider    QUESTIONS  Information for Provider? patient would like a call if can get in sooner   for an appt  ---------------------------------------------------------------------------  --------------  [address]  What is the best way for the office to contact you? OK to leave message on   voicemail  Preferred Call Back Phone Number? [phone]  ---------------------------------------------------------------------------  --------------  SCRIPT ANSWERS  Relationship to Patient?  Self

## 2022-03-18 PROBLEM — E87.0 HYPERNATREMIA: Status: ACTIVE | Noted: 2021-09-02

## 2022-03-18 PROBLEM — M62.82 RHABDOMYOLYSIS: Status: ACTIVE | Noted: 2021-09-02

## 2022-03-18 PROBLEM — G93.41 ACUTE METABOLIC ENCEPHALOPATHY: Status: ACTIVE | Noted: 2021-09-02

## 2022-03-18 PROBLEM — R79.89 D-DIMER, ELEVATED: Status: ACTIVE | Noted: 2021-09-02

## 2022-03-18 PROBLEM — F14.10 COCAINE ABUSE (HCC): Status: ACTIVE | Noted: 2021-09-02

## 2022-03-18 PROBLEM — R74.8 ELEVATED CK: Status: ACTIVE | Noted: 2021-09-02

## 2022-03-19 PROBLEM — A41.9 SEPTIC SHOCK (HCC): Status: ACTIVE | Noted: 2021-09-02

## 2022-03-19 PROBLEM — R65.21 SEPTIC SHOCK (HCC): Status: ACTIVE | Noted: 2021-09-02

## 2022-03-19 PROBLEM — R79.89 ELEVATED LACTIC ACID LEVEL: Status: ACTIVE | Noted: 2021-09-02

## 2022-03-19 PROBLEM — E72.20 HYPERAMMONEMIA (HCC): Status: ACTIVE | Noted: 2021-09-02

## 2022-03-19 PROBLEM — J96.01 ACUTE HYPOXEMIC RESPIRATORY FAILURE (HCC): Status: ACTIVE | Noted: 2021-09-02

## 2022-03-19 PROBLEM — R79.89 ELEVATED LFTS: Status: ACTIVE | Noted: 2021-09-02

## 2022-03-19 PROBLEM — F11.10 OPIATE ABUSE, EPISODIC (HCC): Status: ACTIVE | Noted: 2021-09-02

## 2022-03-19 PROBLEM — R41.82 ALTERED MENTAL STATUS: Status: ACTIVE | Noted: 2021-09-01

## 2022-03-19 PROBLEM — N17.9 AKI (ACUTE KIDNEY INJURY) (HCC): Status: ACTIVE | Noted: 2021-09-02

## 2022-03-19 PROBLEM — D72.819 LEUKOCYTOPENIA: Status: ACTIVE | Noted: 2021-09-02

## 2022-03-19 PROBLEM — F12.10 MILD TETRAHYDROCANNABINOL (THC) ABUSE: Status: ACTIVE | Noted: 2021-09-02

## 2022-05-12 ENCOUNTER — OFFICE VISIT (OUTPATIENT)
Dept: INTERNAL MEDICINE CLINIC | Age: 72
End: 2022-05-12
Payer: MEDICAID

## 2022-05-12 VITALS
DIASTOLIC BLOOD PRESSURE: 79 MMHG | TEMPERATURE: 97.3 F | HEART RATE: 80 BPM | WEIGHT: 140 LBS | OXYGEN SATURATION: 98 % | BODY MASS INDEX: 23.9 KG/M2 | HEIGHT: 64 IN | SYSTOLIC BLOOD PRESSURE: 120 MMHG | RESPIRATION RATE: 13 BRPM

## 2022-05-12 DIAGNOSIS — F14.10 COCAINE ABUSE (HCC): ICD-10-CM

## 2022-05-12 DIAGNOSIS — G62.9 NEUROPATHY: ICD-10-CM

## 2022-05-12 DIAGNOSIS — F41.9 ANXIETY: ICD-10-CM

## 2022-05-12 DIAGNOSIS — F33.0 MILD EPISODE OF RECURRENT MAJOR DEPRESSIVE DISORDER (HCC): ICD-10-CM

## 2022-05-12 DIAGNOSIS — Z00.00 ROUTINE ADULT HEALTH MAINTENANCE: ICD-10-CM

## 2022-05-12 DIAGNOSIS — D22.9 MULTIPLE NEVI: ICD-10-CM

## 2022-05-12 DIAGNOSIS — E78.00 PURE HYPERCHOLESTEROLEMIA: ICD-10-CM

## 2022-05-12 DIAGNOSIS — B18.2 CHRONIC HEPATITIS C WITHOUT HEPATIC COMA (HCC): ICD-10-CM

## 2022-05-12 DIAGNOSIS — R41.82 ALTERED MENTAL STATUS, UNSPECIFIED ALTERED MENTAL STATUS TYPE: ICD-10-CM

## 2022-05-12 DIAGNOSIS — F11.10 OPIATE ABUSE, EPISODIC (HCC): ICD-10-CM

## 2022-05-12 DIAGNOSIS — E05.90 HYPERTHYROIDISM: ICD-10-CM

## 2022-05-12 DIAGNOSIS — Z72.0 TOBACCO USE: ICD-10-CM

## 2022-05-12 DIAGNOSIS — M81.0 AGE RELATED OSTEOPOROSIS, UNSPECIFIED PATHOLOGICAL FRACTURE PRESENCE: ICD-10-CM

## 2022-05-12 DIAGNOSIS — I10 PRIMARY HYPERTENSION: Primary | ICD-10-CM

## 2022-05-12 DIAGNOSIS — M48.062 SPINAL STENOSIS, LUMBAR REGION WITH NEUROGENIC CLAUDICATION: ICD-10-CM

## 2022-05-12 DIAGNOSIS — E11.40 TYPE 2 DIABETES MELLITUS WITH DIABETIC NEUROPATHY, WITHOUT LONG-TERM CURRENT USE OF INSULIN (HCC): ICD-10-CM

## 2022-05-12 DIAGNOSIS — E11.9 TYPE 2 DIABETES MELLITUS WITHOUT COMPLICATION, WITHOUT LONG-TERM CURRENT USE OF INSULIN (HCC): ICD-10-CM

## 2022-05-12 DIAGNOSIS — Z12.31 ENCOUNTER FOR SCREENING MAMMOGRAM FOR MALIGNANT NEOPLASM OF BREAST: ICD-10-CM

## 2022-05-12 PROCEDURE — 99204 OFFICE O/P NEW MOD 45 MIN: CPT | Performed by: INTERNAL MEDICINE

## 2022-05-12 RX ORDER — PAROXETINE HYDROCHLORIDE 40 MG/1
TABLET, FILM COATED ORAL
Qty: 90 TABLET | Refills: 3 | Status: SHIPPED | OUTPATIENT
Start: 2022-05-12

## 2022-05-12 NOTE — ASSESSMENT & PLAN NOTE
Had colonoscopy at [facility] in the last 10 years, was told it was normal   mammo ordered

## 2022-05-12 NOTE — ASSESSMENT & PLAN NOTE
monitored by specialist. No acute findings meriting change in the plan   Dr. Ivana Trujillo  Occasionally takes cocaine $20 worth when pain is bad; does not use heroin anymore  Starting physical therapy

## 2022-05-12 NOTE — ASSESSMENT & PLAN NOTE
Reports she recently had labs done with her endocrinologist.  We will try to get those records and see if they checked a cholesterol

## 2022-05-12 NOTE — ASSESSMENT & PLAN NOTE
monitored by specialist. No acute findings meriting change in the plan   Metformin 500 BID   Dr. Lino Paul  Recently had labs - will try to get

## 2022-05-12 NOTE — ASSESSMENT & PLAN NOTE
Has been out of paxil 40 since November   Works well for her when she's on it  Restart Paxil 40 mg daily

## 2022-05-12 NOTE — PATIENT INSTRUCTIONS
Questions for your insurance:  - ask if you can get TDAP in our office  - ask if you can get labs done in our office  - get the name of a dermatologist who takes your insurance

## 2022-05-12 NOTE — ASSESSMENT & PLAN NOTE
Has lisinopril listed in her chart but she is not currently on any medications. Blood pressure well controlled today.   Monitor

## 2022-05-12 NOTE — PROGRESS NOTES
Assessment and Plan     1. Primary hypertension  Assessment & Plan:  Has lisinopril listed in her chart but she is not currently on any medications. Blood pressure well controlled today. Monitor  2. Encounter for screening mammogram for malignant neoplasm of breast  -     [facility] 3D [facility] W MAMMO BI SCREENING INCL CAD; Future  3. Multiple nevi  Assessment & Plan:  Derm referral for skin check. Advised that if that office does not take her insurance, she should call her insurance to find a doctor covered  Orders:  -     REFERRAL TO DERMATOLOGY  4. Cocaine abuse ([facility]  Assessment & Plan:  Occasionally takes cocaine $20 worth when pain is bad; does not use heroin anymore  5. Chronic hepatitis C without hepatic coma [facility])  Assessment & Plan:   Seen by Sweta Novoa in December and given Epclusa, finished 3 months. Per her note:  \"She was treated with peginterferon and ribavirin in the early 2000s. The patient achieved a SVR/cure. The liver enzymes elevated 9/2021 and the HCV RNA is now positive.      Assessment of liver fibrosis with Fibroscan 8/2021. The result was 6.3 kPa which correlates with stage 1 portal fibrosis. The CAP score of 247 suggests there is no hepatic steatosis. \"    Has not yet followed up with hepatology. I recommend repeating a viral load. When I went to order the labs, they automatically defaulted to [facility].  She does not want to go to [facility].  She will verify with her insurance if she can get her labs drawn here without charge  6. Type 2 diabetes mellitus without complication, without long-term current use of insulin ([facility]  Assessment & Plan:   monitored by specialist. No acute findings meriting change in the plan   Metformin 500 BID   Dr. Damion Mejia  Recently had labs - will try to get  7. Opiate abuse, episodic [facility])  Assessment & Plan:  Denies any heroin use since her September 2021 admission  8.  Pure hypercholesterolemia  Assessment & Plan:  Reports she recently had labs done with her endocrinologist.  We will try to get those records and see if they checked a cholesterol  9. Age related osteoporosis, unspecified pathological fracture presence  Assessment & Plan:   DEXA scan done in 2016 showed osteoporosis T score -2.9 in left radius. Other sites in osteopenia range. 10-year probability hip fracture 4%  Had a lot of things going on so treatment was not discussed at the time  Diagnosis not discussed during the visit. Address in the future  10. Mild episode of recurrent major depressive disorder [facility])  Assessment & Plan:   Has been out of paxil 40 since November   Works well for her when she's on it  Restart Paxil 40 mg daily  Orders:  -     PARoxetine (PAXIL) 40 mg tablet; TAKE 1 TABLET BY MOUTH EVERY DAY  Indications: anxiety, depression, Normal, Disp-90 Tablet, R-3  11. Anxiety  Assessment & Plan:   See depression note  12. Spinal stenosis, lumbar region with neurogenic claudication  Assessment & Plan:   monitored by specialist. No acute findings meriting change in the plan   Dr. Alex Medrano  Occasionally takes cocaine $20 worth when pain is bad; does not use heroin anymore  Starting physical therapy   13. Hyperthyroidism  Assessment & Plan:   monitored by specialist. No acute findings meriting change in the plan   Dr. Sarabia Felt  Methimazole 2.5mg daily   14. Type 2 diabetes mellitus with diabetic neuropathy, without long-term current use of insulin (HCC)  Assessment & Plan:   In feet, likely also multifactorial  15. Neuropathy  Assessment & Plan:  Neuropathy  In feet   16. Routine adult health maintenance  Assessment & Plan:  Had colonoscopy at [facility] in the last 10 years, was told it was normal   mammo ordered  17. Tobacco use  Assessment & Plan:   CTA chest done in 9/2021 with no noted nodules   18. Altered mental status, unspecified altered mental status type  Assessment & Plan:  From dc summary:  Admit date: 9/1/2021     Discharge date of service and time: 9/4/2021     Admission Diagnoses:  Altered mental status [R41.82]     Discharge Diagnoses:    Principal Diagnosis     Acute metabolic encephalopathy                                             Hospital Course and other diagnoses  Acute metabolic encephalopathy / Altered mental status / Anxiety and depression / Mild tetrahydrocannabinol (THC) abuse / Cocaine abuse / Opiate abuse, episodic - POA, better.  Multifactorial, DDx cocaine, THC, opiates, hypoxia, ammonia, dementia, other.   Resumed paxil.   Aware does not show any opiate Rx.  Neurology consulted. Unremarkable EEG and MRI. Benefits, risks, possible drug interactions, and side effects of all new medications were reviewed with the patient. Pt verbalized understanding. Return to clinic: 6 months for depression, osteoporosis, Tdap if insurance will let her get it here    An electronic signature was used to authenticate this note. Santy Marcos MD  [facility]  5/12/2022    No future appointments. History of Present Illness   Chief Complaint   Establish care    Alvin Iverson is a 66 y.o. female         Review of Systems   Constitutional: Negative for chills and fever. HENT: Negative for hearing loss. Eyes: Negative for blurred vision. Respiratory: Negative for shortness of breath. Cardiovascular: Negative for chest pain. Gastrointestinal: Negative for abdominal pain, blood in stool, constipation, diarrhea, melena, nausea and vomiting. Genitourinary: Negative for dysuria and hematuria. Musculoskeletal: Positive for back pain. Negative for joint pain. Skin: Negative for rash. Neurological: Negative for headaches.         Past Medical History     Allergies   Allergen Reactions    Amoxicillin Nausea and Vomiting        Current Outpatient Medications   Medication Sig    PARoxetine (PAXIL) 40 mg tablet TAKE 1 TABLET BY MOUTH EVERY DAY  Indications: anxiety, depression    metFORMIN (GLUMETZA ER) 500 mg TG24 24 hour tablet Take 1 Tablet by mouth two (2) times a day.  methIMAzole (TAPAZOLE) 5 mg tablet Take 2.5 mg by mouth daily. No current facility-administered medications for this visit.           Patient Active Problem List   Diagnosis Code    Hepatitis C, chronic (HCC) B18.2    Primary osteoarthritis of both knees M17.0    Pure hypercholesterolemia E78.00    Osteoporosis M81.0    Primary hypertension I10    Elevated LFTs R79.89    Mild tetrahydrocannabinol (THC) abuse F12.10    Cocaine abuse ([facility] F14.10    Opiate abuse, episodic (HCC) F11.10    Mild episode of recurrent major depressive disorder ([facility] F33.0    Anxiety F41.9    Type 2 diabetes mellitus without complication, without long-term current use of insulin ([facility] E11.9    Spinal stenosis, lumbar region with neurogenic claudication M48.062    Hyperthyroidism E05.90    Neuropathy G62.9    Type 2 diabetes mellitus with diabetic neuropathy E11.40    Routine adult health maintenance Z00.00    Tobacco use Z72.0    Altered mental status R41.82    Multiple nevi D22.9     Past Surgical History:   Procedure Laterality Date    HX CHOLECYSTECTOMY      gallbladder removal    HX ORTHOPAEDIC Right 2012    right knee medial meniscus surgery    MS BREAST SURGERY PROCEDURE UNLISTED      breast implant surgery       Social History     Tobacco Use    Smoking status: Current Every Day Smoker     Packs/day: 1.00     Years: 42.00     Pack years: 42.00     Types: Cigarettes    Smokeless tobacco: Never Used    Tobacco comment: since age 26   Substance Use Topics    Alcohol use: Yes     Comment: a few times a year      Family History   Problem Relation Age of Onset    Cancer Mother          at 59 lung cancer    Cancer Brother         half brother    Other Maternal Grandmother         GI hemorrhage    Other Father         Pt does not know her father    Cancer Brother         half brother        Physical Exam   Vitals:       Visit Vitals  /79 (BP 1 Location: Left upper arm, BP Patient Position: Sitting, BP Cuff Size: Adult)   Pulse 80   Temp 97.3 °F (36.3 °C) (Oral)   Resp 13   Ht 5' 4\" (1.626 m)   Wt 140 lb (63.5 kg)   SpO2 98%   BMI 24.03 kg/m²        Physical Exam  Constitutional:       General: She is not in acute distress. Appearance: She is well-developed. HENT:      Right Ear: Tympanic membrane, ear canal and external ear normal.      Left Ear: Tympanic membrane, ear canal and external ear normal.   Eyes:      Extraocular Movements: Extraocular movements intact. Conjunctiva/sclera: Conjunctivae normal.   Cardiovascular:      Rate and Rhythm: Normal rate and regular rhythm. Pulses: Normal pulses. Heart sounds: No murmur heard. No friction rub. No gallop. Pulmonary:      Effort: No respiratory distress. Breath sounds: No wheezing, rhonchi or rales. Abdominal:      General: Bowel sounds are normal. There is no distension. Palpations: Abdomen is soft. There is no hepatomegaly, splenomegaly or mass. Tenderness: There is no abdominal tenderness. There is no guarding. Musculoskeletal:      Cervical back: Neck supple. Lymphadenopathy:      Cervical: No cervical adenopathy. Skin:     General: Skin is warm. Findings: No rash. Neurological:      Mental Status: She is alert.

## 2022-05-12 NOTE — ASSESSMENT & PLAN NOTE
monitored by specialist. No acute findings meriting change in the plan   Dr. Vince Meier  Methimazole 2.5mg daily

## 2022-05-12 NOTE — ASSESSMENT & PLAN NOTE
From dc summary:  Admit date: 9/1/2021     Discharge date of service and time: 9/4/2021     Admission Diagnoses: Altered mental status [R41.82]     Discharge Diagnoses:    Principal Diagnosis     Acute metabolic encephalopathy                                             Hospital Course and other diagnoses  Acute metabolic encephalopathy / Altered mental status / Anxiety and depression / Mild tetrahydrocannabinol (THC) abuse / Cocaine abuse / Opiate abuse, episodic - POA, better.  Multifactorial, DDx cocaine, THC, opiates, hypoxia, ammonia, dementia, other.   Resumed paxil.   Aware does not show any opiate Rx.  Neurology consulted. Unremarkable EEG and MRI.

## 2022-05-12 NOTE — ASSESSMENT & PLAN NOTE
Derm referral for skin check.   Advised that if that office does not take her insurance, she should call her insurance to find a doctor covered

## 2022-05-12 NOTE — ASSESSMENT & PLAN NOTE
DEXA scan done in 2016 showed osteoporosis T score -2.9 in left radius. Other sites in osteopenia range. 10-year probability hip fracture 4%  Had a lot of things going on so treatment was not discussed at the time  Diagnosis not discussed during the visit.  Address in the future

## 2022-05-12 NOTE — ASSESSMENT & PLAN NOTE
Seen by Sweta Novoa in December and given Epclusa, finished 3 months. Per her note:  \"She was treated with peginterferon and ribavirin in the early 2000s. The patient achieved a SVR/cure. The liver enzymes elevated 9/2021 and the HCV RNA is now positive.      Assessment of liver fibrosis with Fibroscan 8/2021. The result was 6.3 kPa which correlates with stage 1 portal fibrosis. The CAP score of 247 suggests there is no hepatic steatosis. \"    Has not yet followed up with hepatology. I recommend repeating a viral load.   When I went to order the labs, they automatically defaulted to [facility].  She does not want to go to [facility].  She will verify with her insurance if she can get her labs drawn here without charge

## 2022-06-03 ENCOUNTER — HOSPITAL ENCOUNTER (OUTPATIENT)
Dept: MAMMOGRAPHY | Age: 72
Discharge: HOME | End: 2022-06-03
Attending: INTERNAL MEDICINE
Payer: MEDICARE

## 2022-06-03 DIAGNOSIS — Z12.31 ENCOUNTER FOR SCREENING MAMMOGRAM FOR MALIGNANT NEOPLASM OF BREAST: ICD-10-CM

## 2022-06-03 PROCEDURE — 77063 BREAST TOMOSYNTHESIS BI: CPT

## 2022-06-11 PROBLEM — Z00.00 ROUTINE ADULT HEALTH MAINTENANCE: Status: RESOLVED | Noted: 2022-05-12 | Resolved: 2022-06-11

## 2022-08-25 DIAGNOSIS — B18.2 CHRONIC HEPATITIS C WITHOUT HEPATIC COMA (HCC): Primary | ICD-10-CM

## 2022-08-25 DIAGNOSIS — E11.40 TYPE 2 DIABETES MELLITUS WITH DIABETIC NEUROPATHY, WITHOUT LONG-TERM CURRENT USE OF INSULIN (HCC): ICD-10-CM

## 2022-08-25 DIAGNOSIS — I10 PRIMARY HYPERTENSION: ICD-10-CM

## 2022-08-25 DIAGNOSIS — R79.89 ELEVATED LFTS: ICD-10-CM

## 2022-08-25 DIAGNOSIS — E05.90 HYPERTHYROIDISM: ICD-10-CM

## 2022-08-25 DIAGNOSIS — E78.00 PURE HYPERCHOLESTEROLEMIA: ICD-10-CM

## 2022-08-30 LAB
ALBUMIN SERPL-MCNC: 4 G/DL (ref 3.7–4.7)
ALBUMIN/CREAT UR: 8 MG/G CREAT (ref 0–29)
ALBUMIN/GLOB SERPL: 1.4 {RATIO} (ref 1.2–2.2)
ALP SERPL-CCNC: 93 IU/L (ref 44–121)
ALT SERPL-CCNC: 13 IU/L (ref 0–32)
AST SERPL-CCNC: 21 IU/L (ref 0–40)
BASOPHILS # BLD AUTO: 0 X10E3/UL (ref 0–0.2)
BASOPHILS NFR BLD AUTO: 1 %
BILIRUB SERPL-MCNC: 0.6 MG/DL (ref 0–1.2)
BUN SERPL-MCNC: 14 MG/DL (ref 8–27)
BUN/CREAT SERPL: 22 (ref 12–28)
CALCIUM SERPL-MCNC: 9.3 MG/DL (ref 8.7–10.3)
CHLORIDE SERPL-SCNC: 102 MMOL/L (ref 96–106)
CHOLEST SERPL-MCNC: 191 MG/DL (ref 100–199)
CO2 SERPL-SCNC: 22 MMOL/L (ref 20–29)
CREAT SERPL-MCNC: 0.64 MG/DL (ref 0.57–1)
CREAT UR-MCNC: 131.3 MG/DL
EGFR: 94 ML/MIN/1.73
EOSINOPHIL # BLD AUTO: 0.2 X10E3/UL (ref 0–0.4)
EOSINOPHIL NFR BLD AUTO: 3 %
ERYTHROCYTE [DISTWIDTH] IN BLOOD BY AUTOMATED COUNT: 13 % (ref 11.7–15.4)
EST. AVERAGE GLUCOSE BLD GHB EST-MCNC: 120 MG/DL
GLOBULIN SER CALC-MCNC: 2.8 G/DL (ref 1.5–4.5)
GLUCOSE SERPL-MCNC: 118 MG/DL (ref 65–99)
HBA1C MFR BLD: 5.8 % (ref 4.8–5.6)
HCT VFR BLD AUTO: 45.3 % (ref 34–46.6)
HCV GENTYP SERPL NAA+PROBE: NORMAL
HCV RNA SERPL NAA+PROBE-ACNC: NORMAL IU/ML
HCV RNA SERPL NAA+PROBE-LOG IU: NORMAL LOG10 IU/ML
HDLC SERPL-MCNC: 47 MG/DL
HGB BLD-MCNC: 14.8 G/DL (ref 11.1–15.9)
IMM GRANULOCYTES # BLD AUTO: 0 X10E3/UL (ref 0–0.1)
IMM GRANULOCYTES NFR BLD AUTO: 0 %
IMP & REVIEW OF LAB RESULTS: NORMAL
LDLC SERPL CALC-MCNC: 125 MG/DL (ref 0–99)
LYMPHOCYTES # BLD AUTO: 3 X10E3/UL (ref 0.7–3.1)
LYMPHOCYTES NFR BLD AUTO: 51 %
MCH RBC QN AUTO: 30 PG (ref 26.6–33)
MCHC RBC AUTO-ENTMCNC: 32.7 G/DL (ref 31.5–35.7)
MCV RBC AUTO: 92 FL (ref 79–97)
MICROALBUMIN UR-MCNC: 11.1 UG/ML
MONOCYTES # BLD AUTO: 0.5 X10E3/UL (ref 0.1–0.9)
MONOCYTES NFR BLD AUTO: 8 %
NEUTROPHILS # BLD AUTO: 2.2 X10E3/UL (ref 1.4–7)
NEUTROPHILS NFR BLD AUTO: 37 %
PLATELET # BLD AUTO: 175 X10E3/UL (ref 150–450)
POTASSIUM SERPL-SCNC: 4.3 MMOL/L (ref 3.5–5.2)
PROT SERPL-MCNC: 6.8 G/DL (ref 6–8.5)
RBC # BLD AUTO: 4.94 X10E6/UL (ref 3.77–5.28)
SODIUM SERPL-SCNC: 143 MMOL/L (ref 134–144)
T3 SERPL-MCNC: 139 NG/DL (ref 71–180)
T4 FREE SERPL-MCNC: 1.08 NG/DL (ref 0.82–1.77)
TEST INFORMATION: NORMAL
TRIGL SERPL-MCNC: 102 MG/DL (ref 0–149)
TSH SERPL DL<=0.005 MIU/L-ACNC: 1.99 UIU/ML (ref 0.45–4.5)
VLDLC SERPL CALC-MCNC: 19 MG/DL (ref 5–40)
WBC # BLD AUTO: 5.9 X10E3/UL (ref 3.4–10.8)

## 2022-08-31 NOTE — PROGRESS NOTES
Letter sent. CBC normal.  CMP normal.  LFTs significantly improved. . Hep C viral load negative. No microalbuminuria. A1c 5.8. Thyroid studies normal    Negative hep C viral load. May need repeat again in a few months  LDL above goal for somebody with diabetes. Offered discussion for cholesterol medication.

## 2022-12-19 ENCOUNTER — OFFICE VISIT (OUTPATIENT)
Dept: INTERNAL MEDICINE CLINIC | Age: 72
End: 2022-12-19
Payer: MEDICAID

## 2022-12-19 VITALS
SYSTOLIC BLOOD PRESSURE: 133 MMHG | DIASTOLIC BLOOD PRESSURE: 75 MMHG | TEMPERATURE: 98.6 F | OXYGEN SATURATION: 94 % | HEART RATE: 86 BPM | HEIGHT: 64 IN | WEIGHT: 129 LBS | BODY MASS INDEX: 22.02 KG/M2 | RESPIRATION RATE: 16 BRPM

## 2022-12-19 DIAGNOSIS — Z87.891 PERSONAL HISTORY OF TOBACCO USE, PRESENTING HAZARDS TO HEALTH: ICD-10-CM

## 2022-12-19 DIAGNOSIS — Z00.00 ROUTINE ADULT HEALTH MAINTENANCE: ICD-10-CM

## 2022-12-19 DIAGNOSIS — E11.40 TYPE 2 DIABETES MELLITUS WITH DIABETIC NEUROPATHY, WITHOUT LONG-TERM CURRENT USE OF INSULIN (HCC): ICD-10-CM

## 2022-12-19 DIAGNOSIS — F33.0 MILD EPISODE OF RECURRENT MAJOR DEPRESSIVE DISORDER (HCC): ICD-10-CM

## 2022-12-19 DIAGNOSIS — Z72.0 TOBACCO USE: ICD-10-CM

## 2022-12-19 DIAGNOSIS — B18.2 CHRONIC HEPATITIS C WITHOUT HEPATIC COMA (HCC): Primary | ICD-10-CM

## 2022-12-19 DIAGNOSIS — E11.9 TYPE 2 DIABETES MELLITUS WITHOUT COMPLICATION, WITHOUT LONG-TERM CURRENT USE OF INSULIN (HCC): ICD-10-CM

## 2022-12-19 DIAGNOSIS — F14.10 COCAINE ABUSE (HCC): ICD-10-CM

## 2022-12-19 DIAGNOSIS — E05.90 HYPERTHYROIDISM: ICD-10-CM

## 2022-12-19 DIAGNOSIS — M81.0 AGE RELATED OSTEOPOROSIS, UNSPECIFIED PATHOLOGICAL FRACTURE PRESENCE: ICD-10-CM

## 2022-12-19 DIAGNOSIS — Z23 ENCOUNTER FOR IMMUNIZATION: ICD-10-CM

## 2022-12-19 DIAGNOSIS — E78.00 PURE HYPERCHOLESTEROLEMIA: ICD-10-CM

## 2022-12-19 PROCEDURE — G9899 SCRN MAM PERF RSLTS DOC: HCPCS | Performed by: INTERNAL MEDICINE

## 2022-12-19 PROCEDURE — G8536 NO DOC ELDER MAL SCRN: HCPCS | Performed by: INTERNAL MEDICINE

## 2022-12-19 PROCEDURE — 1090F PRES/ABSN URINE INCON ASSESS: CPT | Performed by: INTERNAL MEDICINE

## 2022-12-19 PROCEDURE — G8420 CALC BMI NORM PARAMETERS: HCPCS | Performed by: INTERNAL MEDICINE

## 2022-12-19 PROCEDURE — G8427 DOCREV CUR MEDS BY ELIG CLIN: HCPCS | Performed by: INTERNAL MEDICINE

## 2022-12-19 PROCEDURE — 3017F COLORECTAL CA SCREEN DOC REV: CPT | Performed by: INTERNAL MEDICINE

## 2022-12-19 PROCEDURE — 3044F HG A1C LEVEL LT 7.0%: CPT | Performed by: INTERNAL MEDICINE

## 2022-12-19 PROCEDURE — G0296 VISIT TO DETERM LDCT ELIG: HCPCS | Performed by: INTERNAL MEDICINE

## 2022-12-19 PROCEDURE — 2022F DILAT RTA XM EVC RTNOPTHY: CPT | Performed by: INTERNAL MEDICINE

## 2022-12-19 PROCEDURE — 90715 TDAP VACCINE 7 YRS/> IM: CPT | Performed by: INTERNAL MEDICINE

## 2022-12-19 PROCEDURE — G9717 DOC PT DX DEP/BP F/U NT REQ: HCPCS | Performed by: INTERNAL MEDICINE

## 2022-12-19 PROCEDURE — 99214 OFFICE O/P EST MOD 30 MIN: CPT | Performed by: INTERNAL MEDICINE

## 2022-12-19 PROCEDURE — 1101F PT FALLS ASSESS-DOCD LE1/YR: CPT | Performed by: INTERNAL MEDICINE

## 2022-12-19 NOTE — ASSESSMENT & PLAN NOTE
Takes suboxone from a friend; she doesn't want to get it from a clinic   Did cocaine 3 times this month   Son who  his bday is this month   Encourage seeking formal care with Suboxone clinic, she declines

## 2022-12-19 NOTE — ASSESSMENT & PLAN NOTE
unclear control, continue current medications pending work up below   Metformin 500 extended release twice a day.   Previously followed by Dr. José Milan

## 2022-12-19 NOTE — ASSESSMENT & PLAN NOTE
Most recent , above goal for somebody with diabetes.   However, she is concerned about side effects and declines medications  Monitor

## 2022-12-19 NOTE — PROGRESS NOTES
Verified name and  of patient. Pt comes in and desires a TDAP immunization. Immunization History   Administered Date(s) Administered    COVID-19, PFIZER PURPLE top, DILUTE for use, (age 15 y+), IM, 30mcg/0.3mL 2021, 2021, 12/10/2021    Influenza High Dose Vaccine PF 2019, 10/31/2019, 2020    Influenza, FLUARIX, FLULAVAL, FLUZONE (age 10 mo+) AND AFLURIA, (age 1 y+), PF, 0.5mL 2017, 2017    Pneumococcal Polysaccharide (PPSV-23) 2015, 2017    Tdap 2022       I have reviewed consent forms and confirm with patient that there are either no contraindications to the TDAP shot. Given by order of PCP Tamia Gregory MD. No other issues, no further concerns today, all questions answered.      Signed By: Alok Borjas     2022 9:37 AM

## 2022-12-19 NOTE — PROGRESS NOTES
Note   Chief Complaint   Follow-up    Germán Pascal is a 66 y.o. female     1. Chronic hepatitis C without hepatic coma ([facility] [address]  Assessment & Plan:  Last quant negative, recheck to ensure SVR  Orders:  -     HEPATITIS C QT BY PCR WITH REFLEX GENOTYPE; Future  2. Type 2 diabetes mellitus without complication, without long-term current use of insulin ([facility] [address]  Assessment & Plan:   unclear control, continue current medications pending work up below   Metformin 500 extended release twice a day. Previously followed by Dr. José Milan  Orders:  -     HEMOGLOBIN A1C WITH EAG; Future  3. Hyperthyroidism  Assessment & Plan:  Previously seen Dr. José Milan  On methimazole 2.5mg daily  Check labs  Orders:  -     TSH 3RD GENERATION; Future  -     T4, FREE; Future  -     T3 TOTAL; Future  4. Type 2 diabetes mellitus with diabetic neuropathy, without long-term current use of insulin (HCC)  5. Personal history of tobacco use, presenting hazards to health  -     CT LOW DOSE LUNG CANCER SCREENING; Future  6. Encounter for immunization  -     TDAP, BOOSTRIX, (AGE 10 YRS+), IM  7. Age related osteoporosis, unspecified pathological fracture presence  Assessment & Plan:  Osteoporosis noted on DEXA in 2016. She declines medications due to concern for side effects. Recommend calcium, vitamin D, weightbearing exercises  8. Pure hypercholesterolemia  Assessment & Plan:  Most recent , above goal for somebody with diabetes. However, she is concerned about side effects and declines medications  Monitor  9. Cocaine abuse [facility])  Assessment & Plan:   Gloria Elliott from a friend; she doesn't want to get it from a clinic   Did cocaine 3 times this month   Son who  his bday is this month   Encourage seeking formal care with Suboxone clinic, she declines  8. Routine adult health maintenance  Assessment & Plan: Thinks has had a colonoscopy in the past 10 years   Discussed COVID-vaccine, flu  11.  Mild episode of recurrent major depressive disorder ([facility] [address]  Assessment & Plan:   well controlled, continue current medications   Paxil 40  12. Tobacco use  Assessment & Plan:  Lung cancer screening ordered, she is working on decreasing her tobacco use       Benefits, risks, possible drug interactions, and side effects of all new medications were reviewed with the patient. Pt verbalized understanding. Return to clinic: 6 months for Medicare    An electronic signature was used to authenticate this note. Murali Reyes MD  [facility]  12/19/2022    No future appointments. Objective   Vitals:       Visit Vitals  /75 (BP 1 Location: Left upper arm, BP Patient Position: Sitting, BP Cuff Size: Adult)   Pulse 86   Temp 98.6 °F (37 °C) (Oral)   Resp 16   Ht 5' 4\" (1.626 m)   Wt 129 lb (58.5 kg)   SpO2 94%   BMI 22.14 kg/m²        Physical Exam  Constitutional:       Appearance: Normal appearance. She is not ill-appearing. Cardiovascular:      Rate and Rhythm: Normal rate and regular rhythm. Heart sounds: No murmur heard. No friction rub. No gallop. Pulmonary:      Effort: No respiratory distress. Breath sounds: Normal breath sounds. No wheezing, rhonchi or rales. Neurological:      Mental Status: She is alert. Current Outpatient Medications   Medication Sig    PARoxetine (PAXIL) 40 mg tablet TAKE 1 TABLET BY MOUTH EVERY DAY  Indications: anxiety, depression    metFORMIN (GLUMETZA ER) 500 mg TG24 24 hour tablet Take 1 Tablet by mouth two (2) times a day. methIMAzole (TAPAZOLE) 5 mg tablet Take 2.5 mg by mouth daily. No current facility-administered medications for this visit. Discussed with the patient the current USPSTF guidelines released March 9, 2021 for screening for lung cancer.     For adults aged 48 to [de-identified] years who have a 20 pack-year smoking history and currently smoke or have quit within the past 15 years the grade B recommendation is to:  Screen for lung cancer with low-dose computed tomography (LDCT) every year. Stop screening once a person has not smoked for 15 years or has a health problem that limits life expectancy or the ability to have lung surgery. The patient has a 40+ pack-year history of cigarette smoking and currently is <1ppd. Discussed with patient the risks and benefits of screening, including over-diagnosis, false positive rate, and total radiation exposure. The patient currently exhibits no signs or symptoms suggestive of lung cancer. Discussed with patient the importance of compliance with yearly annual lung cancer screenings and willingness to undergo diagnosis and treatment if screening scan is positive. In addition, the patient was counseled regarding the importance of remaining smoke free and/or total smoking cessation.     Also reviewed the following if the patient has Medicare that as of February 10, 2022, Medicare only covers LDCT screening in patients aged 51-72 with at least a 20 pack-year smoking history who currently smoke or have quit in the last 15 years

## 2022-12-20 LAB
EST. AVERAGE GLUCOSE BLD GHB EST-MCNC: 117 MG/DL
HBA1C MFR BLD: 5.7 % (ref 4.8–5.6)
T3 SERPL-MCNC: 125 NG/DL (ref 71–180)
T4 FREE SERPL-MCNC: 1.15 NG/DL (ref 0.82–1.77)
TSH SERPL DL<=0.005 MIU/L-ACNC: 5.95 UIU/ML (ref 0.45–4.5)

## 2022-12-20 NOTE — PROGRESS NOTES
Please call the pt - please verify. Is she on methimazole? If she is on the medication, her dose of methimazole might be too high. However, this is not usually a medication I deal with. I had documented that she sees Dr. Andreas Marsh with endocrinology - is this correct? If she is on methimazole, she needs to see endocrinology  A1c in prediabetic range  ===  TSH 5.95. Normal free T4. Normal T3.   A1c 5.7

## 2022-12-21 ENCOUNTER — TELEPHONE (OUTPATIENT)
Dept: INTERNAL MEDICINE CLINIC | Age: 72
End: 2022-12-21

## 2022-12-21 LAB
HCV GENTYP SERPL NAA+PROBE: NORMAL
HCV RNA SERPL NAA+PROBE-ACNC: NORMAL IU/ML
HCV RNA SERPL NAA+PROBE-LOG IU: NORMAL LOG10 IU/ML
TEST INFORMATION: NORMAL

## 2022-12-21 NOTE — TELEPHONE ENCOUNTER
----- Message from Camille Martini MD sent at 68/79/1986  4:50 PM EST -----  Please call the pt - please verify. Is she on methimazole? If she is on the medication, her dose of methimazole might be too high. However, this is not usually a medication I deal with. I had documented that she sees Dr. Cameron Logan with endocrinology - is this correct? If she is on methimazole, she needs to see endocrinology  A1c in prediabetic range  ===  TSH 5.95. Normal free T4. Normal T3.   A1c 5.7

## 2022-12-21 NOTE — PROGRESS NOTES
Letter sent. Hep C quant negative. TSH 5.95. Free T4 1. 15. Total T3 normal 125. A1c 5.7    Patient on methimazole.   Advised to follow-up with endocrinology  A1c prediabetic

## 2022-12-21 NOTE — LETTER
12/21/2022 2:48 PM    Ms. Dorita Bob [address]          Dear Dr. Tereso Vigil,    I recently saw the above patient for follow up and got thyroid testing which showed TSH 5.95, total t3 125, free T1.15. She reports taking methimazole 2.5mg daily. I would appreciate assitance with her hyperthyroidism. Thank you!     Sincerely,      Nakul Harp MD

## 2022-12-21 NOTE — TELEPHONE ENCOUNTER
Nurse returned pateint call and provided patient with detailed information regarding provider message with recommendations. Patient confirmed that she is taking half tablet of methimazole 5mg . Patient also confirmed that she is seeing Dr Kai Mccartney. Pateint asking for a letter with labs to take with her to Dr Kai Mccartney office when she follows up with him.  Patient verbalized understanding and was thankful for the call

## 2022-12-30 ENCOUNTER — HOSPITAL ENCOUNTER (OUTPATIENT)
Dept: CT IMAGING | Age: 72
Discharge: HOME | End: 2022-12-30
Attending: INTERNAL MEDICINE
Payer: MEDICARE

## 2022-12-30 DIAGNOSIS — Z87.891 PERSONAL HISTORY OF TOBACCO USE, PRESENTING HAZARDS TO HEALTH: ICD-10-CM

## 2022-12-30 PROCEDURE — 71271 CT THORAX LUNG CANCER SCR C-: CPT

## 2022-12-31 NOTE — PROGRESS NOTES
Please call the pt - is she having any chest pain particularly in her center breast bone? (Please ask this prior to mentioning the fracture)  Her CT lung cancer test shows she has a sternal fracture. Did she fall?   CT scan negative for lung cancer

## 2023-01-03 ENCOUNTER — TELEPHONE (OUTPATIENT)
Dept: INTERNAL MEDICINE CLINIC | Age: 73
End: 2023-01-03

## 2023-01-03 NOTE — TELEPHONE ENCOUNTER
----- Message from Niurka Norris MD sent at 04/11/5318  5:48 PM EST -----  Please call the pt - is she having any chest pain particularly in her center breast bone? (Please ask this prior to mentioning the fracture)  Her CT lung cancer test shows she has a sternal fracture. Did she fall?   CT scan negative for lung cancer

## 2023-01-03 NOTE — TELEPHONE ENCOUNTER
Call made to patient patient stated that she is not having any chest pain no pain at all stated that she fell a year ago on [address] but is not experiencing any pain.

## 2023-03-03 ENCOUNTER — OFFICE VISIT (OUTPATIENT)
Dept: INTERNAL MEDICINE CLINIC | Age: 73
End: 2023-03-03

## 2023-03-03 VITALS
WEIGHT: 122.6 LBS | TEMPERATURE: 98.1 F | BODY MASS INDEX: 20.93 KG/M2 | OXYGEN SATURATION: 96 % | RESPIRATION RATE: 14 BRPM | HEART RATE: 74 BPM | HEIGHT: 64 IN | SYSTOLIC BLOOD PRESSURE: 157 MMHG | DIASTOLIC BLOOD PRESSURE: 87 MMHG

## 2023-03-03 DIAGNOSIS — R31.9 HEMATURIA, UNSPECIFIED TYPE: Primary | ICD-10-CM

## 2023-03-03 DIAGNOSIS — B18.2 CHRONIC HEPATITIS C WITHOUT HEPATIC COMA (HCC): ICD-10-CM

## 2023-03-03 DIAGNOSIS — R80.9 PROTEINURIA, UNSPECIFIED TYPE: ICD-10-CM

## 2023-03-03 DIAGNOSIS — R35.0 URINARY FREQUENCY: ICD-10-CM

## 2023-03-03 LAB
BILIRUB UR QL STRIP: NORMAL
GLUCOSE UR-MCNC: NEGATIVE MG/DL
KETONES P FAST UR STRIP-MCNC: NORMAL MG/DL
PH UR STRIP: 6 [PH] (ref 4.6–8)
PROT UR QL STRIP: NORMAL
SP GR UR STRIP: 1.03 (ref 1–1.03)
UA UROBILINOGEN AMB POC: NORMAL (ref 0.2–1)
URINALYSIS CLARITY POC: CLEAR
URINALYSIS COLOR POC: NORMAL
URINE BLOOD POC: NORMAL
URINE LEUKOCYTES POC: NORMAL
URINE NITRITES POC: NEGATIVE

## 2023-03-03 NOTE — ASSESSMENT & PLAN NOTE
Reports a 1 year history of increasing frequency, urgency, nocturia. Drinks a lot of water although notes recently her urine has been darker. No abdominal pain, fever, chills. Has also noted weight loss and night sweats  UA with moderate blood, protein 100, trace leuks, trace ketone, small bili. Will send for formal UA and urine culture. If negative, recommend urology work-up for hematuria.   Discussed with patient that I recommend urology due to concern for hematuria to look for mass

## 2023-03-03 NOTE — PROGRESS NOTES
Note   Chief Complaint   Urinary frequency    Nancy York is a 67 y.o. female     1. Hematuria, unspecified type  Assessment & Plan:   Reports a 1 year history of increasing frequency, urgency, nocturia. Drinks a lot of water although notes recently her urine has been darker. No abdominal pain, fever, chills. Has also noted weight loss and night sweats  UA with moderate blood, protein 100, trace leuks, trace ketone, small bili. Will send for formal UA and urine culture. If negative, recommend urology work-up for hematuria. Discussed with patient that I recommend urology due to concern for hematuria to look for mass  Orders:  -     CULTURE, URINE; Future  -     URINALYSIS W/MICROSCOPIC; Future  -     REFERRAL TO UROLOGY  2. Urinary frequency  -     AMB POC URINALYSIS DIP STICK AUTO W/ MICRO  3. Proteinuria, unspecified type  -     PROTEIN/CREATININE RATIO, URINE; Future  4. Chronic hepatitis C without hepatic coma (HCC)  Assessment & Plan:  Quant negative     Benefits, risks, possible drug interactions, and side effects of all new medications were reviewed with the patient. Pt verbalized understanding. Return to clinic: 6 weeks for hematuria    An electronic signature was used to authenticate this note. Radha Sneed MD  [facility]  3/3/2023    No future appointments. Objective   Vitals:       Visit Vitals  BP (!) 157/87 (BP 1 Location: Left upper arm, BP Patient Position: Sitting, BP Cuff Size: Small adult)   Pulse 74   Temp 98.1 °F (36.7 °C) (Oral)   Resp 14   Ht 5' 4\" (1.626 m)   Wt 122 lb 9.6 oz (55.6 kg)   SpO2 96%   BMI 21.04 kg/m²        Physical Exam  Constitutional:       Appearance: Normal appearance. She is not ill-appearing. Pulmonary:      Effort: No respiratory distress. Neurological:      Mental Status: She is alert.         Current Outpatient Medications   Medication Sig    PARoxetine (PAXIL) 40 mg tablet TAKE 1 TABLET BY MOUTH EVERY DAY Indications: anxiety, depression    metFORMIN (GLUMETZA ER) 500 mg TG24 24 hour tablet Take 1 Tablet by mouth two (2) times a day. methIMAzole (TAPAZOLE) 5 mg tablet Take 2.5 mg by mouth daily. No current facility-administered medications for this visit.

## 2023-03-04 LAB
APPEARANCE UR: ABNORMAL
BACTERIA URNS QL MICRO: ABNORMAL /HPF
BILIRUB UR QL: NEGATIVE
COLOR UR: ABNORMAL
CREAT UR-MCNC: 122 MG/DL
EPITH CASTS URNS QL MICRO: ABNORMAL /LPF
GLUCOSE UR STRIP.AUTO-MCNC: NEGATIVE MG/DL
HGB UR QL STRIP: ABNORMAL
HYALINE CASTS URNS QL MICRO: ABNORMAL /LPF (ref 0–5)
KETONES UR QL STRIP.AUTO: NEGATIVE MG/DL
LEUKOCYTE ESTERASE UR QL STRIP.AUTO: ABNORMAL
NITRITE UR QL STRIP.AUTO: NEGATIVE
PH UR STRIP: 7 (ref 5–8)
PROT UR STRIP-MCNC: ABNORMAL MG/DL
PROT UR-MCNC: 30 MG/DL (ref 0–11.9)
PROT/CREAT UR-RTO: 0.2
RBC #/AREA URNS HPF: ABNORMAL /HPF (ref 0–5)
SP GR UR REFRACTOMETRY: 1.02 (ref 1–1.03)
UROBILINOGEN UR QL STRIP.AUTO: 0.2 EU/DL (ref 0.2–1)
WBC URNS QL MICRO: ABNORMAL /HPF (ref 0–4)

## 2023-03-05 LAB
BACTERIA SPEC CULT: NORMAL
CC UR VC: NORMAL
SERVICE CMNT-IMP: NORMAL

## 2023-03-06 ENCOUNTER — TELEPHONE (OUTPATIENT)
Dept: INTERNAL MEDICINE CLINIC | Age: 73
End: 2023-03-06

## 2023-03-07 ENCOUNTER — TELEPHONE (OUTPATIENT)
Dept: INTERNAL MEDICINE CLINIC | Age: 73
End: 2023-03-07

## 2023-03-07 NOTE — TELEPHONE ENCOUNTER
Nurse called and spoke with patient , provided patient with detailed information regarding provider message with recommendations. Patient verbalized understanding and was thankful for the call. Referral information provided, office notes, referral fax to Dr Camacho Phipps office.

## 2023-03-07 NOTE — TELEPHONE ENCOUNTER
----- Message from Errol Vance MD sent at 0/4/7627  9:00 PM EST -----  Please call the pt - urine culture is negative but her test still showed blood, so I recommend seeing the urologist. I already gave her a name, but here it is just in case. Her urine also had some protein, but not a lot, so I recommend we repeat that again in a couple months.     Jayne Quiñones [address] [address]       Phone:  Fax:        [phone]

## 2023-03-10 ENCOUNTER — TELEPHONE (OUTPATIENT)
Dept: FAMILY MEDICINE CLINIC | Age: 73
End: 2023-03-10

## 2023-03-10 NOTE — TELEPHONE ENCOUNTER
----- Message from Tani Beach sent at 3/2/2023  2:31 PM EST -----  Subject: Appointment Request    Reason for Call: New Patient/New to Provider Appointment needed: New   Patient Request Appointment    QUESTIONS    Reason for appointment request? No appointments available during search     Additional Information for Provider? Patient would like to see if she can   establish her care with provider Soledad Moran as her provider DR. Kathryn Del Real will will be moving on.  Please contact to advise.   ---------------------------------------------------------------------------  --------------  Carmlea LOZADA  [phone]; OK to leave message on voicemail  ---------------------------------------------------------------------------  --------------  SCRIPT ANSWERS  GUSTAVO Screen: Gisel Fernandez

## 2023-04-14 PROBLEM — E11.42 TYPE 2 DIABETES MELLITUS WITH DIABETIC POLYNEUROPATHY, WITHOUT LONG-TERM CURRENT USE OF INSULIN (HCC): Status: ACTIVE | Noted: 2022-05-12

## 2023-05-12 ENCOUNTER — TRANSCRIBE ORDERS (OUTPATIENT)
Facility: HOSPITAL | Age: 73
End: 2023-05-12

## 2023-05-12 DIAGNOSIS — Z12.31 VISIT FOR SCREENING MAMMOGRAM: Primary | ICD-10-CM

## 2023-06-09 ENCOUNTER — TELEPHONE (OUTPATIENT)
Age: 73
End: 2023-06-09

## 2023-06-09 NOTE — TELEPHONE ENCOUNTER
----- Message from Js Ruiz sent at 6/9/2023  8:32 AM EDT -----  Subject: Message to Provider    QUESTIONS  Information for Provider? pt called in and stated she has a cold and is   dealing with mucus, she is asking if something could be sent over to the   pharmacy if possible she is also urinating a lot due to coughing please   follow up [id number] [id number] , [address], [address] [facility] Phone? [phone]  Fax? [phone]   ---------------------------------------------------------------------------  --------------  John BENNETT  [phone]; OK to leave message on voicemail  ---------------------------------------------------------------------------  --------------  SCRIPT ANSWERS  Relationship to Patient?  Self

## 2023-06-09 NOTE — TELEPHONE ENCOUNTER
Nurse returned call. Patient stated that she is having some cough and congestion. Patient has not tested for covid. Patient advised to test for covid. No SOB. Patient declined urgent care d/t incontinence. Patient advised no available vv or office visit available. Patient stated that she will test for covid and will wait until pcp comes back in the office next week for evaluation. Patient was thankful for the call back. Patient advised if sx worsen to go to er for evaluation patient thankful.

## 2023-08-10 RX ORDER — METFORMIN HYDROCHLORIDE 500 MG/1
TABLET, EXTENDED RELEASE ORAL
Qty: 90 TABLET | Refills: 0 | Status: SHIPPED | OUTPATIENT
Start: 2023-08-10

## 2023-08-22 RX ORDER — METFORMIN HYDROCHLORIDE 500 MG/1
TABLET, EXTENDED RELEASE ORAL
Qty: 90 TABLET | Refills: 0 | OUTPATIENT
Start: 2023-08-22

## 2023-11-03 RX ORDER — METFORMIN HYDROCHLORIDE 500 MG/1
TABLET, EXTENDED RELEASE ORAL
Qty: 90 TABLET | Refills: 0 | OUTPATIENT
Start: 2023-11-03

## 2023-11-15 RX ORDER — METFORMIN HYDROCHLORIDE 500 MG/1
TABLET, EXTENDED RELEASE ORAL
Qty: 90 TABLET | Refills: 0 | OUTPATIENT
Start: 2023-11-15

## 2023-12-07 RX ORDER — METFORMIN HYDROCHLORIDE 500 MG/1
TABLET, EXTENDED RELEASE ORAL
Qty: 90 TABLET | Refills: 0 | Status: SHIPPED | OUTPATIENT
Start: 2023-12-07

## 2023-12-07 NOTE — TELEPHONE ENCOUNTER
Last refill from our practice sent to pharmacy, pt has an appt to est care with a new pcp/practice in March 2024, Dr. Kallie Combs.

## 2023-12-21 ENCOUNTER — HOSPITAL ENCOUNTER (OUTPATIENT)
Facility: HOSPITAL | Age: 73
Discharge: HOME | End: 2023-12-24
Attending: INTERNAL MEDICINE
Payer: MEDICARE

## 2023-12-21 DIAGNOSIS — Z12.31 VISIT FOR SCREENING MAMMOGRAM: ICD-10-CM

## 2023-12-21 PROCEDURE — 77063 BREAST TOMOSYNTHESIS BI: CPT

## 2024-01-31 ENCOUNTER — OFFICE VISIT (OUTPATIENT)
Age: 74
End: 2024-01-31
Payer: MEDICARE

## 2024-01-31 VITALS
OXYGEN SATURATION: 93 % | SYSTOLIC BLOOD PRESSURE: 118 MMHG | WEIGHT: 114.8 LBS | BODY MASS INDEX: 19.6 KG/M2 | TEMPERATURE: 97.4 F | RESPIRATION RATE: 14 BRPM | HEART RATE: 76 BPM | DIASTOLIC BLOOD PRESSURE: 75 MMHG | HEIGHT: 64 IN

## 2024-01-31 DIAGNOSIS — F33.0 MILD EPISODE OF RECURRENT MAJOR DEPRESSIVE DISORDER (HCC): ICD-10-CM

## 2024-01-31 DIAGNOSIS — E11.40 TYPE 2 DIABETES MELLITUS WITH DIABETIC NEUROPATHY, WITHOUT LONG-TERM CURRENT USE OF INSULIN (HCC): Primary | ICD-10-CM

## 2024-01-31 DIAGNOSIS — B18.2 CHRONIC HEPATITIS C WITHOUT HEPATIC COMA (HCC): ICD-10-CM

## 2024-01-31 DIAGNOSIS — F17.210 CIGARETTE NICOTINE DEPENDENCE WITHOUT COMPLICATION: ICD-10-CM

## 2024-01-31 DIAGNOSIS — E78.49 OTHER HYPERLIPIDEMIA: ICD-10-CM

## 2024-01-31 DIAGNOSIS — F41.9 ANXIETY: ICD-10-CM

## 2024-01-31 PROBLEM — R79.89 ELEVATED LFTS: Status: RESOLVED | Noted: 2021-09-02 | Resolved: 2024-01-31

## 2024-01-31 PROBLEM — F11.10 OPIATE ABUSE, EPISODIC (HCC): Status: RESOLVED | Noted: 2021-09-02 | Resolved: 2024-01-31

## 2024-01-31 PROBLEM — N39.46 URINARY INCONTINENCE, MIXED: Status: ACTIVE | Noted: 2024-01-31

## 2024-01-31 PROBLEM — R41.82 ALTERED MENTAL STATUS: Status: RESOLVED | Noted: 2022-05-12 | Resolved: 2024-01-31

## 2024-01-31 PROBLEM — G62.9 NEUROPATHY: Status: RESOLVED | Noted: 2022-05-12 | Resolved: 2024-01-31

## 2024-01-31 PROBLEM — F14.10 COCAINE ABUSE (HCC): Status: RESOLVED | Noted: 2021-09-02 | Resolved: 2024-01-31

## 2024-01-31 PROBLEM — R31.9 HEMATURIA, UNSPECIFIED TYPE: Status: ACTIVE | Noted: 2023-03-03

## 2024-01-31 PROBLEM — Z72.0 TOBACCO USE: Status: RESOLVED | Noted: 2022-05-12 | Resolved: 2024-01-31

## 2024-01-31 LAB
ALBUMIN SERPL-MCNC: 4.2 G/DL (ref 3.5–5)
ALBUMIN/GLOB SERPL: 1.4 (ref 1.1–2.2)
ALP SERPL-CCNC: 87 U/L (ref 45–117)
ALT SERPL-CCNC: 31 U/L (ref 12–78)
ANION GAP SERPL CALC-SCNC: 4 MMOL/L (ref 5–15)
AST SERPL-CCNC: 21 U/L (ref 15–37)
BILIRUB SERPL-MCNC: 0.8 MG/DL (ref 0.2–1)
BUN SERPL-MCNC: 26 MG/DL (ref 6–20)
BUN/CREAT SERPL: 46 (ref 12–20)
CALCIUM SERPL-MCNC: 9 MG/DL (ref 8.5–10.1)
CHLORIDE SERPL-SCNC: 108 MMOL/L (ref 97–108)
CHOLEST SERPL-MCNC: 179 MG/DL
CO2 SERPL-SCNC: 30 MMOL/L (ref 21–32)
CREAT SERPL-MCNC: 0.57 MG/DL (ref 0.55–1.02)
GLOBULIN SER CALC-MCNC: 3 G/DL (ref 2–4)
GLUCOSE SERPL-MCNC: 109 MG/DL (ref 65–100)
HDLC SERPL-MCNC: 67 MG/DL
HDLC SERPL: 2.7 (ref 0–5)
LDLC SERPL CALC-MCNC: 95.2 MG/DL (ref 0–100)
POTASSIUM SERPL-SCNC: 3.7 MMOL/L (ref 3.5–5.1)
PROT SERPL-MCNC: 7.2 G/DL (ref 6.4–8.2)
SODIUM SERPL-SCNC: 142 MMOL/L (ref 136–145)
TRIGL SERPL-MCNC: 84 MG/DL
VLDLC SERPL CALC-MCNC: 16.8 MG/DL

## 2024-01-31 PROCEDURE — 3078F DIAST BP <80 MM HG: CPT | Performed by: INTERNAL MEDICINE

## 2024-01-31 PROCEDURE — 99215 OFFICE O/P EST HI 40 MIN: CPT | Performed by: INTERNAL MEDICINE

## 2024-01-31 PROCEDURE — 3074F SYST BP LT 130 MM HG: CPT | Performed by: INTERNAL MEDICINE

## 2024-01-31 PROCEDURE — 1123F ACP DISCUSS/DSCN MKR DOCD: CPT | Performed by: INTERNAL MEDICINE

## 2024-01-31 RX ORDER — BUSPIRONE HYDROCHLORIDE 5 MG/1
5 TABLET ORAL 2 TIMES DAILY
Qty: 60 TABLET | Refills: 2 | Status: SHIPPED | OUTPATIENT
Start: 2024-01-31

## 2024-01-31 SDOH — ECONOMIC STABILITY: FOOD INSECURITY: WITHIN THE PAST 12 MONTHS, YOU WORRIED THAT YOUR FOOD WOULD RUN OUT BEFORE YOU GOT MONEY TO BUY MORE.: OFTEN TRUE

## 2024-01-31 ASSESSMENT — PATIENT HEALTH QUESTIONNAIRE - PHQ9
SUM OF ALL RESPONSES TO PHQ QUESTIONS 1-9: 0
SUM OF ALL RESPONSES TO PHQ9 QUESTIONS 1 & 2: 0
2. FEELING DOWN, DEPRESSED OR HOPELESS: 0
1. LITTLE INTEREST OR PLEASURE IN DOING THINGS: 0
SUM OF ALL RESPONSES TO PHQ QUESTIONS 1-9: 0

## 2024-01-31 NOTE — PROGRESS NOTES
sounds: No murmur heard.  Pulmonary:      Effort: Pulmonary effort is normal.      Breath sounds: Normal breath sounds. No wheezing or rales.   Musculoskeletal:      Right lower leg: No edema.      Left lower leg: No edema.   Neurological:      Mental Status: She is alert.   Psychiatric:         Mood and Affect: Mood normal. Mood is not depressed.         Behavior: Behavior normal.            On this date 1/31/2024 I have spent 54 minutes reviewing previous notes, test results and face to face with the patient discussing the diagnosis and importance of compliance with the treatment plan as well as documenting on the day of the visit.      An electronic signature was used to authenticate this note.    --Fawn Paul MD

## 2024-01-31 NOTE — PATIENT INSTRUCTIONS
If you have not been contacted within 72 hours for scheduling your imaging study, please call [phone].      QUIT NOW [place]  [phone] ([phone])  [employer]

## 2024-02-01 LAB
EST. AVERAGE GLUCOSE BLD GHB EST-MCNC: 108 MG/DL
HBA1C MFR BLD: 5.4 % (ref 4–5.6)

## 2024-02-16 ENCOUNTER — HOSPITAL ENCOUNTER (OUTPATIENT)
Facility: HOSPITAL | Age: 74
End: 2024-02-16
Attending: INTERNAL MEDICINE
Payer: MEDICARE

## 2024-02-16 DIAGNOSIS — F17.210 CIGARETTE NICOTINE DEPENDENCE WITHOUT COMPLICATION: ICD-10-CM

## 2024-02-16 PROCEDURE — 71271 CT THORAX LUNG CANCER SCR C-: CPT

## 2024-02-21 ENCOUNTER — TELEPHONE (OUTPATIENT)
Age: 74
End: 2024-02-21

## 2024-02-21 NOTE — TELEPHONE ENCOUNTER
Patient called stating she had to stop taking busPIRone (BUSPAR) 5 MG tablet she said it makes her feel dopey/lethargic, she doesn't like the way the medicine makes her feel.     Call Alley [phone]

## 2024-02-22 RX ORDER — PAROXETINE 10 MG/1
10 TABLET, FILM COATED ORAL DAILY
Qty: 30 TABLET | Refills: 5 | Status: SHIPPED | OUTPATIENT
Start: 2024-02-22

## 2024-02-22 NOTE — TELEPHONE ENCOUNTER
Noted. Please ask if she would like to try a lower dose of the paroxetine, since she was on 40 mg and worried about dizziness, we can try 10 mg of paroxetine instead.

## 2024-02-22 NOTE — TELEPHONE ENCOUNTER
Nurse spoke with patient and gave recommendations from provider. Patient is okay and willing to try lower dose of paroxetine. Patient verbalized understanding and was thankful for the call.

## 2024-02-29 ENCOUNTER — TELEPHONE (OUTPATIENT)
Age: 74
End: 2024-02-29

## 2024-02-29 NOTE — TELEPHONE ENCOUNTER
Nurse spoke with patient and gave provider recommendations  Your CT is very low risk. Minimal nodule noted, was present before, no change. They do note some calcium in the arteries. We will discuss this and possibly starting rosuvastatin again when I see you in April. Take care. Patient verbalized understanding and was thankful for the results

## 2024-03-21 RX ORDER — METFORMIN HYDROCHLORIDE 500 MG/1
TABLET, EXTENDED RELEASE ORAL
Qty: 90 TABLET | Refills: 4 | Status: SHIPPED | OUTPATIENT
Start: 2024-03-21

## 2024-04-30 ENCOUNTER — TELEPHONE (OUTPATIENT)
Age: 74
End: 2024-04-30

## 2024-04-30 ENCOUNTER — OFFICE VISIT (OUTPATIENT)
Age: 74
End: 2024-04-30
Payer: MEDICAID

## 2024-04-30 VITALS
WEIGHT: 123.4 LBS | DIASTOLIC BLOOD PRESSURE: 84 MMHG | RESPIRATION RATE: 14 BRPM | BODY MASS INDEX: 21.07 KG/M2 | HEART RATE: 65 BPM | OXYGEN SATURATION: 96 % | HEIGHT: 64 IN | SYSTOLIC BLOOD PRESSURE: 166 MMHG

## 2024-04-30 DIAGNOSIS — M81.0 AGE-RELATED OSTEOPOROSIS WITHOUT CURRENT PATHOLOGICAL FRACTURE: ICD-10-CM

## 2024-04-30 DIAGNOSIS — E78.49 OTHER HYPERLIPIDEMIA: ICD-10-CM

## 2024-04-30 DIAGNOSIS — R31.9 HEMATURIA, UNSPECIFIED TYPE: ICD-10-CM

## 2024-04-30 DIAGNOSIS — R03.0 ELEVATED BLOOD PRESSURE READING WITHOUT DIAGNOSIS OF HYPERTENSION: ICD-10-CM

## 2024-04-30 DIAGNOSIS — E11.42 TYPE 2 DIABETES MELLITUS WITH DIABETIC POLYNEUROPATHY, WITHOUT LONG-TERM CURRENT USE OF INSULIN (HCC): ICD-10-CM

## 2024-04-30 DIAGNOSIS — F41.9 ANXIETY: ICD-10-CM

## 2024-04-30 DIAGNOSIS — Z00.00 MEDICARE ANNUAL WELLNESS VISIT, SUBSEQUENT: Primary | ICD-10-CM

## 2024-04-30 LAB
25(OH)D3 SERPL-MCNC: 16.8 NG/ML (ref 30–100)
ANION GAP SERPL CALC-SCNC: 3 MMOL/L (ref 5–15)
APPEARANCE UR: CLEAR
BACTERIA URNS QL MICRO: NEGATIVE /HPF
BILIRUB UR QL: NEGATIVE
BUN SERPL-MCNC: 20 MG/DL (ref 6–20)
BUN/CREAT SERPL: 34 (ref 12–20)
CALCIUM SERPL-MCNC: 9 MG/DL (ref 8.5–10.1)
CHLORIDE SERPL-SCNC: 105 MMOL/L (ref 97–108)
CO2 SERPL-SCNC: 32 MMOL/L (ref 21–32)
COLOR UR: ABNORMAL
CREAT SERPL-MCNC: 0.58 MG/DL (ref 0.55–1.02)
CREAT UR-MCNC: 221 MG/DL
EPITH CASTS URNS QL MICRO: ABNORMAL /LPF
EST. AVERAGE GLUCOSE BLD GHB EST-MCNC: 114 MG/DL
GLUCOSE SERPL-MCNC: 105 MG/DL (ref 65–100)
GLUCOSE UR STRIP.AUTO-MCNC: NEGATIVE MG/DL
HBA1C MFR BLD: 5.6 % (ref 4–5.6)
HGB UR QL STRIP: NEGATIVE
KETONES UR QL STRIP.AUTO: NEGATIVE MG/DL
LEUKOCYTE ESTERASE UR QL STRIP.AUTO: ABNORMAL
MICROALBUMIN UR-MCNC: 2.39 MG/DL
MICROALBUMIN/CREAT UR-RTO: 11 MG/G (ref 0–30)
NITRITE UR QL STRIP.AUTO: NEGATIVE
PH UR STRIP: 6.5 (ref 5–8)
POTASSIUM SERPL-SCNC: 4 MMOL/L (ref 3.5–5.1)
PROT UR STRIP-MCNC: ABNORMAL MG/DL
RBC #/AREA URNS HPF: ABNORMAL /HPF (ref 0–5)
SODIUM SERPL-SCNC: 140 MMOL/L (ref 136–145)
SP GR UR REFRACTOMETRY: 1.02 (ref 1–1.03)
SPECIMEN HOLD: NORMAL
UROBILINOGEN UR QL STRIP.AUTO: 0.2 EU/DL (ref 0.2–1)
WBC URNS QL MICRO: ABNORMAL /HPF (ref 0–4)

## 2024-04-30 PROCEDURE — 1123F ACP DISCUSS/DSCN MKR DOCD: CPT | Performed by: INTERNAL MEDICINE

## 2024-04-30 PROCEDURE — 99214 OFFICE O/P EST MOD 30 MIN: CPT | Performed by: INTERNAL MEDICINE

## 2024-04-30 PROCEDURE — 3044F HG A1C LEVEL LT 7.0%: CPT | Performed by: INTERNAL MEDICINE

## 2024-04-30 PROCEDURE — 3079F DIAST BP 80-89 MM HG: CPT | Performed by: INTERNAL MEDICINE

## 2024-04-30 PROCEDURE — 3077F SYST BP >= 140 MM HG: CPT | Performed by: INTERNAL MEDICINE

## 2024-04-30 PROCEDURE — G0439 PPPS, SUBSEQ VISIT: HCPCS | Performed by: INTERNAL MEDICINE

## 2024-04-30 RX ORDER — ALENDRONATE SODIUM 70 MG/1
70 TABLET ORAL
Qty: 12 TABLET | Refills: 4 | Status: SHIPPED | OUTPATIENT
Start: 2024-04-30

## 2024-04-30 SDOH — ECONOMIC STABILITY: HOUSING INSECURITY
IN THE LAST 12 MONTHS, WAS THERE A TIME WHEN YOU DID NOT HAVE A STEADY PLACE TO SLEEP OR SLEPT IN A SHELTER (INCLUDING NOW)?: NO

## 2024-04-30 SDOH — ECONOMIC STABILITY: FOOD INSECURITY: WITHIN THE PAST 12 MONTHS, YOU WORRIED THAT YOUR FOOD WOULD RUN OUT BEFORE YOU GOT MONEY TO BUY MORE.: NEVER TRUE

## 2024-04-30 SDOH — ECONOMIC STABILITY: INCOME INSECURITY: HOW HARD IS IT FOR YOU TO PAY FOR THE VERY BASICS LIKE FOOD, HOUSING, MEDICAL CARE, AND HEATING?: NOT HARD AT ALL

## 2024-04-30 SDOH — ECONOMIC STABILITY: FOOD INSECURITY: WITHIN THE PAST 12 MONTHS, THE FOOD YOU BOUGHT JUST DIDN'T LAST AND YOU DIDN'T HAVE MONEY TO GET MORE.: NEVER TRUE

## 2024-04-30 ASSESSMENT — PATIENT HEALTH QUESTIONNAIRE - PHQ9
SUM OF ALL RESPONSES TO PHQ QUESTIONS 1-9: 3
SUM OF ALL RESPONSES TO PHQ QUESTIONS 1-9: 3
2. FEELING DOWN, DEPRESSED OR HOPELESS: MORE THAN HALF THE DAYS
SUM OF ALL RESPONSES TO PHQ9 QUESTIONS 1 & 2: 3
SUM OF ALL RESPONSES TO PHQ QUESTIONS 1-9: 3
SUM OF ALL RESPONSES TO PHQ QUESTIONS 1-9: 3
1. LITTLE INTEREST OR PLEASURE IN DOING THINGS: SEVERAL DAYS

## 2024-04-30 ASSESSMENT — LIFESTYLE VARIABLES
HOW MANY STANDARD DRINKS CONTAINING ALCOHOL DO YOU HAVE ON A TYPICAL DAY: PATIENT DOES NOT DRINK
HOW OFTEN DO YOU HAVE A DRINK CONTAINING ALCOHOL: NEVER

## 2024-04-30 NOTE — PATIENT INSTRUCTIONS
balanced diet that includes whole grains, vegetables, and fruits, and that is low in saturated fat and sodium.  Encourage the person you're caring for not to use tobacco products. They can affect dental and general health.  Many older adults have a fixed income and feel that they can't afford dental care. But most towns and cities have programs in which dentists help older adults by lowering fees. Contact your area's public health offices or  for information about dental care in your area.  Using a toothbrush  Older adults with arthritis sometimes have trouble brushing their teeth because they can't easily hold the toothbrush. Their hands and fingers may be stiff, painful, or weak. If this is the case, you can:  Offer an electric toothbrush.  Enlarge the handle of a non-electric toothbrush by wrapping a sponge, an elastic bandage, or adhesive tape around it.  Push the toothbrush handle through a ball made of rubber or soft foam.  Make the handle longer and thicker by taping Popsicle sticks or tongue depressors to it.  You may also be able to buy special toothbrushes, toothpaste dispensers, and floss holders.  Your doctor may recommend a soft-bristle toothbrush if the person you care for bleeds easily. Bleeding can happen because of a health problem or from certain medicines.  A toothpaste for sensitive teeth may help if the person you care for has sensitive teeth.  How do you brush and floss someone's teeth?  If the person you are caring for has a hard time cleaning their teeth on their own, you may need to brush and floss their teeth for them. It may be easiest to have the person sit and face away from you, and to sit or stand behind them. That way you can steady their head against your arm as you reach around to floss and brush their teeth. Choose a place that has good lighting and is comfortable for both of you.  Before you begin, gather your supplies. You will need gloves, floss, a toothbrush, and

## 2024-04-30 NOTE — TELEPHONE ENCOUNTER
Nurse informed that patient does not have a ride home. Patient reports to PSR that her [facility] provided [employer] transportation for her to get here for her appt, but they picked her up at 7am when her appt wasn't until 9am & patient stated that she does not have a ride home & she would not offer to call her ins company bc it took her 3 hours to get through yesterday.  When this nurse calls the [facility] Transportation Line at [phone], it states that for best service, members need to schedule transport 5 days in advance & to be patient. Nurse waited thru transportation line automated prompts & continued to request a live agent (nurse had to request a live agent 6 times before the transfer took place).    Nurse spoke with , Giovanny, who stated that an Uber  would pick patient up at her doc's office in about an hour, so patient needs to go downstairs and wait patiently. Patient kept walking back upstairs to [facility] the Uber ride was not available yet, but instead of helping, the patient was getting agitated & fussy. PSR came to speak with this nurse again, but before this nurse could offer more assistance, another nurse noted that patient walked out the office front door in a frustrated manner stating that she would just walk home bc she doesn't want to have to wait.  It is recommended that patient get with her  & see if she can get the Transportation Samia downloaded onto her cell phone, so it's easier for her to schedule her rides to & from her appts. This may be a case of where the patient is not comfortable with the new technology, so she may need help; suggest that she look to her family for assistance in getting the transport samia set up on her phone.  Thank you

## 2024-04-30 NOTE — PROGRESS NOTES
Take 1 tablet by mouth every 7 days  Dispense: 12 tablet; Refill: 4    6. Hematuria, unspecified type  Noted in the past.  She will have a urinalysis today to follow.  - Urinalysis with Microscopic; Future    7. Elevated blood pressure reading without diagnosis of hypertension  Not previously noted.  She did have difficulty getting to the office today has had significant transportation issues and stress at home.  Will follow-up in 3 months after she monitors her blood pressures at home.       Recommendations for Preventive Services Due: see orders and patient instructions/AVS.  Recommended screening schedule for the next 5-10 years is provided to the patient in written form: see Patient Instructions/AVS.       Follow up: 6 months for DM        Subjective       Alley Gannon is here for a wellness visit.    Health maintenance: Ms. Gannon has been seen by the following providers in the last year: endocrinology.  Last colonoscopy was done in 2017. Denies any significant decrease in hearing.  Has had no falls in the last year. Ms. Gannon is independent with ADLs, and iADLs, and feels safe at home.    She is urinating all day, feels extremely tired. Her appetite is off--foods do not look good to her. She is not cooking, has been eating cake. She is just disgusted with how things are going--for six months, she did not have a stove or refrigerator, but she has them now. Her weight has been stable, increased a bit. Ms. Gannon has never taken medication to prevent fracture with osteoporosis. The Paxil is helping, even with her stress.      Patient's complete Health Risk Assessment and screening values have been reviewed and are found in Flowsheets. The following problems were reviewed today and where indicated follow up appointments were made and/or referrals ordered.    Positive Risk Factor Screenings with Interventions:          Drug Use:   Substance and Sexual Activity   Drug Use Yes    Types: Marijuana (Weed)      DAST-10

## 2024-05-01 ENCOUNTER — TELEPHONE (OUTPATIENT)
Age: 74
End: 2024-05-01

## 2024-05-01 NOTE — TELEPHONE ENCOUNTER
[facility] system checked for patient current vaccines by nurse. Patient vaccine chart updated per [facility].

## 2024-05-09 ENCOUNTER — TELEPHONE (OUTPATIENT)
Age: 74
End: 2024-05-09

## 2024-05-09 DIAGNOSIS — M81.0 AGE-RELATED OSTEOPOROSIS WITHOUT CURRENT PATHOLOGICAL FRACTURE: ICD-10-CM

## 2024-05-09 RX ORDER — ALENDRONATE SODIUM 70 MG/1
70 TABLET ORAL
Qty: 12 TABLET | Refills: 4 | Status: SHIPPED | OUTPATIENT
Start: 2024-05-09

## 2024-05-09 NOTE — TELEPHONE ENCOUNTER
Nurse called and spoke with patient. Nurse advise of providers message regarding labs. Patient has been unable to get medication d/t lack of transportation nurse sent medication to mail order pharmacy to be delivered to home. Patient was thankful.

## 2024-05-09 NOTE — TELEPHONE ENCOUNTER
Patient called to inform of Mail order Pharmacy is [facility] Patient is requesting B-3 5000mg    [facility] - [place], [place] - [address] [facility] -  [phone] - f [phone]

## 2024-05-10 RX ORDER — PAROXETINE 10 MG/1
10 TABLET, FILM COATED ORAL DAILY
Qty: 30 TABLET | Refills: 5 | Status: SHIPPED | OUTPATIENT
Start: 2024-05-10

## 2024-05-23 ENCOUNTER — TELEPHONE (OUTPATIENT)
Age: 74
End: 2024-05-23

## 2024-05-23 NOTE — TELEPHONE ENCOUNTER
Spoke with patient, per patient she has been on Paxil 40mg ever since her first visit with Dr. Paul. The 10mg was added in February and she has been taking a total of 50mg per day. Patient states she did not realize she was taking strengths until her visit today with her insurance. She also states she thinks the Paxil is causing extreme fatigue. Per patient, she is going to stop the 40mg tablet today. I advised patient that I will reach out to PCP to see how she would prefer the patient wean off of the 40mg of Paxil. Thanks.

## 2024-05-23 NOTE — TELEPHONE ENCOUNTER
Called patient Pt's name and  verified. Advised patient of message from PCP, patient verbalized understanding. Scheduled follow-up appointment for 24 at 10:15am and advise her to bring in all medications to be reviewed. Patient was very thankful. Thanks.

## 2024-05-23 NOTE — TELEPHONE ENCOUNTER
Alethea Burt with [facility]. In home AWV and patient is confused on medication doses. The PARoxetine (PAXIL) 10 MG tablet.     Patient also has a 40mg tablet from a previous provider and has been taking both for the past month.    Please contact patient and clarify what dosage she should be taking.        [phone]

## 2024-05-23 NOTE — TELEPHONE ENCOUNTER
Please call patient: Back in February, she told me she did not tolerate the buspirone we started in January (she had been off the Paxil at that time), so I wrote a prescription for lower dose paroxetine 10 mg instead of the previous dose of 40 mg.     If she is currently taking 50 mg, that is too much. She should take the 10 mg daily and make an appointment to see me in a week or two. Then we can discuss alternatives, if she is still feeling fatigued and having anxiety symptoms not well-managed.

## 2024-05-29 ENCOUNTER — TELEPHONE (OUTPATIENT)
Age: 74
End: 2024-05-29

## 2024-05-29 NOTE — TELEPHONE ENCOUNTER
Cyndi called from [facility] she is requesting for a copy of the patient most recent lab results / office notes to be fax to the number below:  Fax:[phone]

## 2024-06-03 ENCOUNTER — OFFICE VISIT (OUTPATIENT)
Age: 74
End: 2024-06-03
Payer: MEDICARE

## 2024-06-03 VITALS
SYSTOLIC BLOOD PRESSURE: 158 MMHG | RESPIRATION RATE: 18 BRPM | HEIGHT: 64 IN | HEART RATE: 72 BPM | WEIGHT: 125.6 LBS | BODY MASS INDEX: 21.44 KG/M2 | DIASTOLIC BLOOD PRESSURE: 83 MMHG | OXYGEN SATURATION: 97 %

## 2024-06-03 DIAGNOSIS — F41.9 ANXIETY: Primary | ICD-10-CM

## 2024-06-03 DIAGNOSIS — R35.0 URINARY FREQUENCY: ICD-10-CM

## 2024-06-03 DIAGNOSIS — I10 ESSENTIAL HYPERTENSION: ICD-10-CM

## 2024-06-03 DIAGNOSIS — N39.3 STRESS INCONTINENCE IN FEMALE: ICD-10-CM

## 2024-06-03 DIAGNOSIS — M81.0 AGE-RELATED OSTEOPOROSIS WITHOUT CURRENT PATHOLOGICAL FRACTURE: ICD-10-CM

## 2024-06-03 DIAGNOSIS — R41.89 COGNITIVE CHANGES: ICD-10-CM

## 2024-06-03 LAB
BILIRUBIN, URINE, POC: NEGATIVE
BLOOD URINE, POC: NEGATIVE
GLUCOSE URINE, POC: NEGATIVE
KETONES, URINE, POC: NEGATIVE
LEUKOCYTE ESTERASE, URINE, POC: NEGATIVE
NITRITE, URINE, POC: NEGATIVE
PH, URINE, POC: 7 (ref 4.6–8)
PROTEIN,URINE, POC: NORMAL
SPECIFIC GRAVITY, URINE, POC: 1.02 (ref 1–1.03)
URINALYSIS CLARITY, POC: CLEAR
URINALYSIS COLOR, POC: YELLOW
UROBILINOGEN, POC: NORMAL

## 2024-06-03 PROCEDURE — G8399 PT W/DXA RESULTS DOCUMENT: HCPCS | Performed by: INTERNAL MEDICINE

## 2024-06-03 PROCEDURE — 3078F DIAST BP <80 MM HG: CPT | Performed by: INTERNAL MEDICINE

## 2024-06-03 PROCEDURE — G8427 DOCREV CUR MEDS BY ELIG CLIN: HCPCS | Performed by: INTERNAL MEDICINE

## 2024-06-03 PROCEDURE — 4004F PT TOBACCO SCREEN RCVD TLK: CPT | Performed by: INTERNAL MEDICINE

## 2024-06-03 PROCEDURE — G8420 CALC BMI NORM PARAMETERS: HCPCS | Performed by: INTERNAL MEDICINE

## 2024-06-03 PROCEDURE — PBSHW AMB POC URINALYSIS DIP STICK AUTO W/O MICRO: Performed by: INTERNAL MEDICINE

## 2024-06-03 PROCEDURE — 3017F COLORECTAL CA SCREEN DOC REV: CPT | Performed by: INTERNAL MEDICINE

## 2024-06-03 PROCEDURE — 1090F PRES/ABSN URINE INCON ASSESS: CPT | Performed by: INTERNAL MEDICINE

## 2024-06-03 PROCEDURE — 99214 OFFICE O/P EST MOD 30 MIN: CPT | Performed by: INTERNAL MEDICINE

## 2024-06-03 PROCEDURE — 0509F URINE INCON PLAN DOCD: CPT | Performed by: INTERNAL MEDICINE

## 2024-06-03 PROCEDURE — 3077F SYST BP >= 140 MM HG: CPT | Performed by: INTERNAL MEDICINE

## 2024-06-03 PROCEDURE — 81003 URINALYSIS AUTO W/O SCOPE: CPT | Performed by: INTERNAL MEDICINE

## 2024-06-03 PROCEDURE — 1123F ACP DISCUSS/DSCN MKR DOCD: CPT | Performed by: INTERNAL MEDICINE

## 2024-06-03 RX ORDER — LANOLIN ALCOHOL/MO/W.PET/CERES
1000 CREAM (GRAM) TOPICAL DAILY
COMMUNITY

## 2024-06-03 RX ORDER — ALENDRONATE SODIUM 70 MG/1
70 TABLET ORAL
Qty: 12 TABLET | Refills: 4 | Status: SHIPPED | OUTPATIENT
Start: 2024-06-03

## 2024-06-03 NOTE — PATIENT INSTRUCTIONS
When checking your blood pressure at home: you should sit for five minutes with your feet on the floor and your back supported and an empty bladder, prior to checking your blood pressure.   Check your blood pressure at different times of the day and write down the readings.   Bring or send the readings to me after a couple weeks.  The goal is to be under 130/80 at home.    If you need a new blood pressure cuff, you can search online--www.validatebp.org-- to find a blood pressure cuff model which is validated as accurate. You may then order that specific model.

## 2024-06-03 NOTE — PROGRESS NOTES
A/P:    1. Anxiety  Continued.  She has been on too high of a dose of paroxetine, after being off of the medication earlier this year.  She did not tolerate buspirone.  Continue paroxetine 10 mg, will evaluate her concerns about her memory as noted below.    2. Age-related osteoporosis without current pathological fracture  Refilling alendronate, sending to her mail order again today.  - alendronate (FOSAMAX) 70 MG tablet; Take 1 tablet by mouth every 7 days  Dispense: 12 tablet; Refill: 4    3. Essential hypertension  Not currently treated, but with significant blood pressure elevations, but she was very anxious and not sitting still for much of the exam, has not checked blood pressures at home.  She has also only just stopped taking the high dose of paroxetine.  Asked her to monitor blood pressures at home now that she has a cuff she can use appropriately.  Blood pressure was very good earlier this year.  She was previously on lisinopril, so I will have a low threshold for resuming this medication.    4. Stress incontinence in female  Previously evaluated by urology, but she would prefer to see a female provider.  Referring her to Dr. Kennedy.  - AMB POC URINALYSIS DIP STICK AUTO W/O MICRO  - [facility] - Kenia Kennedy MD, UrogynecologyDaniel ([address])    5. Urinary frequency  Increased, over baseline.  Likely underlying overactive bladder and stress incontinence.  No sign of acute infection today.  - AMB POC URINALYSIS DIP STICK AUTO W/O MICRO    6. Cognitive changes  We discussed stress, anxiety as contributing factors.  She we will see neuropsychology for full evaluation, to rule out mild cognitive impairment.  - [facility] - Vikki Guardado PsyD, Neuropsychology [place]         Follow up:  4 weeks for HTN          An electronic signature was used to authenticate this note.    --Fawn Paul MD         HPI: Alley Gannon is here for a follow up visit:      Anxiety: Ms. Gannon is currently taking 10 
56 kg (123 lb 6.4 oz)   01/31/24 52.1 kg (114 lb 12.8 oz)     Temp Readings from Last 3 Encounters:   01/31/24 97.4 °F (36.3 °C) (Oral)     BP Readings from Last 3 Encounters:   06/03/24 (!) 158/83   04/30/24 (!) 166/84   01/31/24 118/75     Pulse Readings from Last 3 Encounters:   06/03/24 72   04/30/24 65   01/31/24 76         Coordination of Care Questionnaire:   1. Have you been to the ER, urgent care clinic since your last visit?  Hospitalized since your last visit?No    2. Have you seen or consulted any other health care providers outside of the [facility] since your last visit?  Include any pap smears or colon screening. Yes Dr Torri La        This patient is accompanied in the office by her self. I have received verbal consent from Alley Gannon to discuss any/all medical information while they are present in the room.    An electronic signature was used to authenticate this note.    --RONNI PIERRE MA

## 2024-06-11 ENCOUNTER — ENROLLMENT (OUTPATIENT)
Dept: PHARMACY | Facility: CLINIC | Age: 74
End: 2024-06-11

## 2024-06-13 ENCOUNTER — TELEPHONE (OUTPATIENT)
Age: 74
End: 2024-06-13

## 2024-06-13 DIAGNOSIS — T46.6X5A MYALGIA DUE TO STATIN: Primary | ICD-10-CM

## 2024-06-13 DIAGNOSIS — M79.10 MYALGIA DUE TO STATIN: Primary | ICD-10-CM

## 2024-06-13 NOTE — TELEPHONE ENCOUNTER
----- Message from No Schafer sent at 6/13/2024 12:48 PM EDT -----  Subject: Message to Provider    QUESTIONS  Information for Provider? Neha with [facility] is calling to say the pt may   possibly have diabetes. but may not currently be on a statin per the   current ADA guideline & ACC AHA cholesterol guidelines recommendations.   Please ask provider to reevaluate pt & prescribe a statin if clinically   appropriate. and if appropriate please send a new prescription to pt's   pharmacy ASAP so they can close this opportunity before the end of the   year. If member doesn't tolerate/meet exclusion criteria for statin use   please document the ICD10 Code  ---------------------------------------------------------------------------  --------------  CALL BACK INFO  [phone]; OK to leave message on voicemail  ---------------------------------------------------------------------------  --------------  SCRIPT ANSWERS  Relationship to Patient? Covered Entity  Covered Entity Type? Health Insurance?  Representative Name? Neha

## 2024-06-14 PROBLEM — M79.10 MYALGIA DUE TO STATIN: Status: ACTIVE | Noted: 2024-06-14

## 2024-06-14 PROBLEM — T46.6X5A MYALGIA DUE TO STATIN: Status: ACTIVE | Noted: 2024-06-14

## 2024-06-26 ENCOUNTER — TELEPHONE (OUTPATIENT)
Age: 74
End: 2024-06-26

## 2024-06-26 NOTE — TELEPHONE ENCOUNTER
----- Message from Benjamín Zambrano Jose Alejandro sent at 6/26/2024  1:52 PM EDT -----  Regarding: [facility] Referral Request  E[facility] Referral Request    Reason for referral request: Specialty Provider    Specialist/Lab/Test patient is requesting (if known):neurologist    Specialist Phone Number (if applicable): [phone]    Additional Information N/A  --------------------------------------------------------------------------------------------------------------------------    Relationship to Patient: Self     Call Back Information: OK to leave message on voicemail  Preferred Call Back Number: Phone  [phone]

## 2024-06-26 NOTE — TELEPHONE ENCOUNTER
Called [facility] in regards to referral request as a referral for neuro was placed on 6/3/24 or did she want a diff neurologist

## 2024-06-28 ENCOUNTER — TELEPHONE (OUTPATIENT)
Dept: PHARMACY | Facility: CLINIC | Age: 74
End: 2024-06-28

## 2024-06-28 NOTE — TELEPHONE ENCOUNTER
next Office visit.  In a tele encounter you noted 6/132024 \"She declines statin therapy after previous myalgias on statin trials.\"      Allergies   Allergen Reactions    Amoxicillin Nausea And Vomiting       SUPD may be excluded:  [x]A1c <6.4 (Prediabetes R73.03, R73.09)  [x]Statin Listed as Allergy  - mention in tele encounter 6/14/2024 of pt declines for myalgia  []Other dx can exclude(Cirrhosis,ESRD,Hospice, Dialysis, PCOS, pregnancy/lactation/fertility)  []Previous Year exclusion Myalgia M79.10 - Ask for Myopathy G72.0  []Previous year exclusion:             Component  Ref Range & Units 4/30/24 1005 1/31/24 1432 12/19/22 0000 8/26/22 0000 9/2/21 1342   Hemoglobin A1C  4.0 - 5.6 % 5.6 5.4 CM 5.7 High  R, CM 5.8 High  R, CM 6.0 High  CM        LIPID EVALUATION AND GUIDELINE ASSESSMENT  Lab Results   Component Value Date/Time    CHOL 179 01/31/2024 02:32 PM    TRIG 84 01/31/2024 02:32 PM    HDL 67 01/31/2024 02:32 PM    LDL 95.2 01/31/2024 02:32 PM    VLDL 16.8 01/31/2024 02:32 PM    VLDL 19 08/26/2022 12:00 AM    ALT 31 01/31/2024 02:32 PM    AST 21 01/31/2024 02:32 PM     The 10-year ASCVD risk score (Alicia MEMBRENO, et al., 2019) is: 49%    Values used to calculate the score:      Age: 73 years      Sex: Female      Is Non- : No      Diabetic: Yes      Tobacco smoker: Yes      Systolic Blood Pressure: 158 mmHg      Is BP treated: No      HDL Cholesterol: 67 MG/DL      Total Cholesterol: 179 MG/DL     ADA 2023/ 2018 ACC Guidelines indicate the patient is a candidate for a high-intensity statin.    2018 ACC/AHA/ 2023 ADA Guidelines:  >/= 40-76 yo ; Patient has:Adults 40 to 75 years of age With Diabetes (without clinical ASCVD) and  LDL 70 to 189 mg/dL (95.2  mg/dL) and elevated 10-year risk of ASCVD (ASCVD risk >20% (49  %) - Elevated/High Risk (ACC 2018)  LDL Target goal: <70mg/dL- Aged 40-76 yo with Diabetes       STATIN GAP PLAN  The following are interventions that have been

## 2024-07-05 ENCOUNTER — TELEPHONE (OUTPATIENT)
Age: 74
End: 2024-07-05

## 2024-07-05 NOTE — TELEPHONE ENCOUNTER
[facility] is asking for our office to call the patient back to have her scheduled with Dr. Guardado.    Patient does have a referral on file.

## 2024-08-14 ENCOUNTER — TELEPHONE (OUTPATIENT)
Age: 74
End: 2024-08-14

## 2024-08-14 NOTE — TELEPHONE ENCOUNTER
Received a call from patient with concerns for her extreme fatigue. Patient states that she has been experiencing some extreme fatigue for awhile now. She states that she was prescribed Vitamin D by Dr. Paul and that it is making her tired. She states that she was also given Fosamax and it is making her stomach hurt and she doesn't want to take it anymore. She states that she is taking all of the meds just like Dr. Paul told her to take them and they are still giving her undesirable side effects. Patient states that she went to the hospital ([facility]) awhile ago and was diagnosed with pneumonia and she doesn't know if she was misdiagnosed. She states that she took a fourth of a  suboxone pill in order to \"wake herself up.\" Patient states that she is not prescribed suboxone but she uses them to get energy. She states that she will bring the pill with her when she comes in for her appointment. Patient also has complaints of a \"foul mood.\" She states that she has been really mean lately and she doesn't understand why. She doesn't know if it is because of medication or something else. Patient given appointment for tomorrow at 1115am and was very appreciative.     Zahida \"All\" AIDEE Lopez

## 2024-08-15 ENCOUNTER — OFFICE VISIT (OUTPATIENT)
Age: 74
End: 2024-08-15
Payer: MEDICARE

## 2024-08-15 VITALS
OXYGEN SATURATION: 97 % | HEIGHT: 64 IN | RESPIRATION RATE: 14 BRPM | SYSTOLIC BLOOD PRESSURE: 158 MMHG | HEART RATE: 80 BPM | WEIGHT: 128 LBS | DIASTOLIC BLOOD PRESSURE: 96 MMHG | BODY MASS INDEX: 21.85 KG/M2

## 2024-08-15 DIAGNOSIS — E55.9 VITAMIN D DEFICIENCY: ICD-10-CM

## 2024-08-15 DIAGNOSIS — R53.83 OTHER FATIGUE: ICD-10-CM

## 2024-08-15 DIAGNOSIS — I10 PRIMARY HYPERTENSION: ICD-10-CM

## 2024-08-15 DIAGNOSIS — I10 PRIMARY HYPERTENSION: Primary | ICD-10-CM

## 2024-08-15 DIAGNOSIS — E05.90 HYPERTHYROIDISM: ICD-10-CM

## 2024-08-15 PROCEDURE — 3080F DIAST BP >= 90 MM HG: CPT | Performed by: INTERNAL MEDICINE

## 2024-08-15 PROCEDURE — 1090F PRES/ABSN URINE INCON ASSESS: CPT | Performed by: INTERNAL MEDICINE

## 2024-08-15 PROCEDURE — G8420 CALC BMI NORM PARAMETERS: HCPCS | Performed by: INTERNAL MEDICINE

## 2024-08-15 PROCEDURE — G8427 DOCREV CUR MEDS BY ELIG CLIN: HCPCS | Performed by: INTERNAL MEDICINE

## 2024-08-15 PROCEDURE — 3017F COLORECTAL CA SCREEN DOC REV: CPT | Performed by: INTERNAL MEDICINE

## 2024-08-15 PROCEDURE — 99214 OFFICE O/P EST MOD 30 MIN: CPT | Performed by: INTERNAL MEDICINE

## 2024-08-15 PROCEDURE — 4004F PT TOBACCO SCREEN RCVD TLK: CPT | Performed by: INTERNAL MEDICINE

## 2024-08-15 PROCEDURE — 1123F ACP DISCUSS/DSCN MKR DOCD: CPT | Performed by: INTERNAL MEDICINE

## 2024-08-15 PROCEDURE — G8399 PT W/DXA RESULTS DOCUMENT: HCPCS | Performed by: INTERNAL MEDICINE

## 2024-08-15 PROCEDURE — 3077F SYST BP >= 140 MM HG: CPT | Performed by: INTERNAL MEDICINE

## 2024-08-15 RX ORDER — LOSARTAN POTASSIUM 50 MG/1
50 TABLET ORAL DAILY
Qty: 90 TABLET | Refills: 1 | Status: SHIPPED | OUTPATIENT
Start: 2024-08-15

## 2024-08-15 ASSESSMENT — PATIENT HEALTH QUESTIONNAIRE - PHQ9
SUM OF ALL RESPONSES TO PHQ QUESTIONS 1-9: 2
2. FEELING DOWN, DEPRESSED OR HOPELESS: SEVERAL DAYS
SUM OF ALL RESPONSES TO PHQ9 QUESTIONS 1 & 2: 2
SUM OF ALL RESPONSES TO PHQ QUESTIONS 1-9: 2
1. LITTLE INTEREST OR PLEASURE IN DOING THINGS: SEVERAL DAYS

## 2024-08-15 NOTE — PROGRESS NOTES
A/P:    1. Primary hypertension  Poor control, not currently treated. She will start losartan 50 mg daily, pending labs today.  - Comprehensive Metabolic Panel; Future  - losartan (COZAAR) 50 MG tablet; Take 1 tablet by mouth daily  Dispense: 90 tablet; Refill: 1    2. Vitamin D deficiency  On vitamin D, but with suspected adverse effects. Will obtain repeat vitamin D levels today and possibly recommend alternative management if she is having side effects with the oral D3.   - Vitamin D 25 Hydroxy; Future    3. Other fatigue  Unclear etiology. I emphasized the importance of her avoiding taking other people's medications, especially controlled substances, like Suboxone. She has a history of substance abuse.  I am requesting records from her admission in late July for review. She may hold the vitamin D for now, pending labs. Will obtain CBC, CMP, TSH and vitamin D level today.  - Comprehensive Metabolic Panel; Future  - TSH; Future  - Vitamin B12; Future    4. Hyperthyroidism  Followed by endocrinology, on methimazole, but she is not sure of her last or next appointment date. With her current symptoms, will obtain a TSH and T4 level today.  - T4, Free; Future  - TSH; Future         Follow up:  1-2 weeks hypertension, fatigue, smoking         An electronic signature was used to authenticate this note.    --Fawn Paul MD         HPI: Alley Gannon is here for an acute visit.    Fatigue: Ms. Gannon has had a frontal headache, nausea, and very low energy since starting the vitamin D. She has continued to take the vitamin D, even though she has had this issue with her energy, and is taking the weekly alendronate.     Seizure: Two weeks ago, she was seen at [facility], for a seizure--Her roommate called an ambulance because she was just staring for a while. They told her she had pneumonia--levofloxacin for six days was prescribed on 8/2/24. Of note, she has been taking suboxone from a friend for \"energy\", not

## 2024-08-16 LAB
25(OH)D3 SERPL-MCNC: 35.7 NG/ML (ref 30–100)
ALBUMIN SERPL-MCNC: 4 G/DL (ref 3.5–5)
ALBUMIN/GLOB SERPL: 1.2 (ref 1.1–2.2)
ALP SERPL-CCNC: 94 U/L (ref 45–117)
ALT SERPL-CCNC: 28 U/L (ref 12–78)
ANION GAP SERPL CALC-SCNC: 4 MMOL/L (ref 5–15)
AST SERPL-CCNC: 16 U/L (ref 15–37)
BILIRUB SERPL-MCNC: 0.7 MG/DL (ref 0.2–1)
BUN SERPL-MCNC: 17 MG/DL (ref 6–20)
BUN/CREAT SERPL: 28 (ref 12–20)
CALCIUM SERPL-MCNC: 9.4 MG/DL (ref 8.5–10.1)
CHLORIDE SERPL-SCNC: 105 MMOL/L (ref 97–108)
CO2 SERPL-SCNC: 29 MMOL/L (ref 21–32)
CREAT SERPL-MCNC: 0.6 MG/DL (ref 0.55–1.02)
GLOBULIN SER CALC-MCNC: 3.4 G/DL (ref 2–4)
GLUCOSE SERPL-MCNC: 107 MG/DL (ref 65–100)
POTASSIUM SERPL-SCNC: 4.1 MMOL/L (ref 3.5–5.1)
PROT SERPL-MCNC: 7.4 G/DL (ref 6.4–8.2)
SODIUM SERPL-SCNC: 138 MMOL/L (ref 136–145)
T4 FREE SERPL-MCNC: 1.1 NG/DL (ref 0.8–1.5)
TSH SERPL DL<=0.05 MIU/L-ACNC: 1.41 UIU/ML (ref 0.36–3.74)
VIT B12 SERPL-MCNC: 782 PG/ML (ref 193–986)

## 2024-08-29 ENCOUNTER — OFFICE VISIT (OUTPATIENT)
Age: 74
End: 2024-08-29
Payer: MEDICARE

## 2024-08-29 VITALS
SYSTOLIC BLOOD PRESSURE: 119 MMHG | HEART RATE: 95 BPM | BODY MASS INDEX: 22.61 KG/M2 | DIASTOLIC BLOOD PRESSURE: 82 MMHG | RESPIRATION RATE: 14 BRPM | WEIGHT: 132.4 LBS | HEIGHT: 64 IN | OXYGEN SATURATION: 95 %

## 2024-08-29 DIAGNOSIS — R53.83 OTHER FATIGUE: ICD-10-CM

## 2024-08-29 DIAGNOSIS — F19.10 SUBSTANCE ABUSE (HCC): ICD-10-CM

## 2024-08-29 DIAGNOSIS — F33.0 MAJOR DEPRESSIVE DISORDER, RECURRENT, MILD (HCC): Primary | ICD-10-CM

## 2024-08-29 DIAGNOSIS — E55.9 VITAMIN D DEFICIENCY: ICD-10-CM

## 2024-08-29 DIAGNOSIS — F17.210 CIGARETTE NICOTINE DEPENDENCE WITHOUT COMPLICATION: ICD-10-CM

## 2024-08-29 PROCEDURE — 1090F PRES/ABSN URINE INCON ASSESS: CPT | Performed by: INTERNAL MEDICINE

## 2024-08-29 PROCEDURE — 3074F SYST BP LT 130 MM HG: CPT | Performed by: INTERNAL MEDICINE

## 2024-08-29 PROCEDURE — 1123F ACP DISCUSS/DSCN MKR DOCD: CPT | Performed by: INTERNAL MEDICINE

## 2024-08-29 PROCEDURE — G8420 CALC BMI NORM PARAMETERS: HCPCS | Performed by: INTERNAL MEDICINE

## 2024-08-29 PROCEDURE — 3079F DIAST BP 80-89 MM HG: CPT | Performed by: INTERNAL MEDICINE

## 2024-08-29 PROCEDURE — G8427 DOCREV CUR MEDS BY ELIG CLIN: HCPCS | Performed by: INTERNAL MEDICINE

## 2024-08-29 PROCEDURE — 4004F PT TOBACCO SCREEN RCVD TLK: CPT | Performed by: INTERNAL MEDICINE

## 2024-08-29 PROCEDURE — 3017F COLORECTAL CA SCREEN DOC REV: CPT | Performed by: INTERNAL MEDICINE

## 2024-08-29 PROCEDURE — 99214 OFFICE O/P EST MOD 30 MIN: CPT | Performed by: INTERNAL MEDICINE

## 2024-08-29 PROCEDURE — G8399 PT W/DXA RESULTS DOCUMENT: HCPCS | Performed by: INTERNAL MEDICINE

## 2024-08-29 RX ORDER — BUPROPION HYDROCHLORIDE 150 MG/1
150 TABLET, EXTENDED RELEASE ORAL 2 TIMES DAILY
Qty: 60 TABLET | Refills: 2 | Status: SHIPPED | OUTPATIENT
Start: 2024-08-29

## 2024-08-29 ASSESSMENT — PATIENT HEALTH QUESTIONNAIRE - PHQ9
1. LITTLE INTEREST OR PLEASURE IN DOING THINGS: NEARLY EVERY DAY
SUM OF ALL RESPONSES TO PHQ QUESTIONS 1-9: 6
SUM OF ALL RESPONSES TO PHQ QUESTIONS 1-9: 6
2. FEELING DOWN, DEPRESSED OR HOPELESS: NEARLY EVERY DAY
SUM OF ALL RESPONSES TO PHQ9 QUESTIONS 1 & 2: 6
SUM OF ALL RESPONSES TO PHQ QUESTIONS 1-9: 6
SUM OF ALL RESPONSES TO PHQ QUESTIONS 1-9: 6

## 2024-08-29 NOTE — PATIENT INSTRUCTIONS
Start taking the alendronate again. Once a week.     Start taking cod liver oil.     Start taking 1/2 tablet of paroxetine once daily for one week, then after one week, stop taking the paroxetine.     Start taking bupropion 100 mg after a week.

## 2024-08-29 NOTE — PROGRESS NOTES
A/P:    1. Major depressive disorder, recurrent, mild (HCC)  Not well controlled. She did not tolerate higher doses of paroxetine. She will taper off this medication and try bupropion 150 mg twice daily.   - buPROPion (WELLBUTRIN SR) 150 MG extended release tablet; Take 1 tablet by mouth 2 times daily  Dispense: 60 tablet; Refill: 2    2. Vitamin D deficiency  Improved, but did not tolerate the supplement. I recommended she try cod liver oil and resume taking alendronate.    3. Substance abuse (HCC)  Denies current use and agrees to avoid use of Suboxone or other illicit substances in the past.    4. Other fatigue  Possibly due to depression, stress. Treating as noted above.    5. Cigarette nicotine dependence without complication  She would like to cut down on smoking in an effort to quit. Bupropion recommended.  - buPROPion (WELLBUTRIN SR) 150 MG extended release tablet; Take 1 tablet by mouth 2 times daily  Dispense: 60 tablet; Refill: 2         Follow up:  5 weeks for depression, fatigue          An electronic signature was used to authenticate this note.    --Fawn Paul MD         HPI: Alley Gannon is here for a follow up visit:      Fatigue: Her energy level is very low, still. She is not having cramps any more--stopped both the vitamin D and the alendronate. She has been waking up early in the morning and is so tired she has to nap during the day. Now she goes to bed earlier, will go to bed at 7 pm. She has to force herself to get out of bed and do things to care for her dog. She has high stress with an incontinent, non-hygienic renter. She has given him his thirty day notice. Ms. Gannon does want to stop smoking.     Review of the records from her admission last month showed she had been admitted for AMS and found only to have opiates on the drug screen. She was noted to have hypoxia briefly on discharge, and found to have pneumonia on imaging. She was not admitted for pneumonia. Since then, she has  not used any Suboxone.      Current Outpatient Medications:     diclofenac sodium ([employer] DICLOFENAC SODIUM) 1 % GEL, Place onto the skin, Disp: , Rfl:     losartan (COZAAR) 50 MG tablet, Take 1 tablet by mouth daily, Disp: 90 tablet, Rfl: 1    Apoaequorin (PREVAGEN PO), Take by mouth, Disp: , Rfl:     vitamin B-12 (CYANOCOBALAMIN) 1000 MCG tablet, Take 1 tablet by mouth daily, Disp: , Rfl:     PARoxetine (PAXIL) 10 MG tablet, Take 1 tablet by mouth daily, Disp: 30 tablet, Rfl: 5    methIMAzole (TAPAZOLE) 5 MG tablet, Take 0.5 tablets by mouth daily, Disp: , Rfl:     alendronate (FOSAMAX) 70 MG tablet, Take 1 tablet by mouth every 7 days (Patient not taking: Reported on 8/29/2024), Disp: 12 tablet, Rfl: 4      Allergies   Allergen Reactions    Amoxicillin Nausea And Vomiting        /82 (Site: Left Upper Arm, Position: Sitting, Cuff Size: Small Adult)   Pulse 95   Resp 14   Ht 1.626 m (5' 4\")   Wt 60.1 kg (132 lb 6.4 oz)   SpO2 95%   BMI 22.73 kg/m²      General: well nourished, well appearing, in no distress  Heart: regular, normal rate, no murmurs noted, distal pulses 2+ bilaterally, no pitting peripheral edema  Lungs: good air movement, clear to auscultation bilaterally, no wheezing or rales noted   Psych: full, appropriate affect, normal speech, no SIs           Lab Results   Component Value Date     08/15/2024    K 4.1 08/15/2024     08/15/2024    CO2 29 08/15/2024    BUN 17 08/15/2024    CREATININE 0.60 08/15/2024    GLUCOSE 107 (H) 08/15/2024    CALCIUM 9.4 08/15/2024    BILITOT 0.7 08/15/2024    ALKPHOS 94 08/15/2024    AST 16 08/15/2024    ALT 28 08/15/2024    LABGLOM >90 08/15/2024    GFRAA >60 09/04/2021    AGRATIO 1.4 08/26/2022    GLOB 3.4 08/15/2024          Lab Results   Component Value Date    TSH 1.41 08/15/2024        Lab Results   Component Value Date/Time    VITD25 35.7 08/15/2024 12:17 PM      Component  Ref Range & Units 8/15/24 1217   Vitamin B-12  193 - 986 pg/mL 782

## 2024-09-03 ENCOUNTER — TELEPHONE (OUTPATIENT)
Age: 74
End: 2024-09-03

## 2024-09-26 DIAGNOSIS — F33.0 MAJOR DEPRESSIVE DISORDER, RECURRENT, MILD (HCC): ICD-10-CM

## 2024-09-26 DIAGNOSIS — F17.210 CIGARETTE NICOTINE DEPENDENCE WITHOUT COMPLICATION: ICD-10-CM

## 2024-09-27 RX ORDER — BUPROPION HYDROCHLORIDE 150 MG/1
150 TABLET, EXTENDED RELEASE ORAL 2 TIMES DAILY
Qty: 180 TABLET | Refills: 4 | Status: SHIPPED | OUTPATIENT
Start: 2024-09-27

## 2024-10-03 ENCOUNTER — OFFICE VISIT (OUTPATIENT)
Age: 74
End: 2024-10-03
Payer: MEDICARE

## 2024-10-03 VITALS
BODY MASS INDEX: 23.52 KG/M2 | HEIGHT: 64 IN | DIASTOLIC BLOOD PRESSURE: 90 MMHG | WEIGHT: 137.8 LBS | RESPIRATION RATE: 14 BRPM | HEART RATE: 82 BPM | OXYGEN SATURATION: 94 % | SYSTOLIC BLOOD PRESSURE: 145 MMHG

## 2024-10-03 DIAGNOSIS — I10 ESSENTIAL HYPERTENSION: Primary | ICD-10-CM

## 2024-10-03 DIAGNOSIS — F33.0 MILD EPISODE OF RECURRENT MAJOR DEPRESSIVE DISORDER (HCC): ICD-10-CM

## 2024-10-03 DIAGNOSIS — R19.7 DIARRHEA OF PRESUMED INFECTIOUS ORIGIN: ICD-10-CM

## 2024-10-03 DIAGNOSIS — E05.90 HYPERTHYROIDISM: ICD-10-CM

## 2024-10-03 DIAGNOSIS — F17.210 CIGARETTE NICOTINE DEPENDENCE WITHOUT COMPLICATION: ICD-10-CM

## 2024-10-03 PROCEDURE — 3017F COLORECTAL CA SCREEN DOC REV: CPT | Performed by: INTERNAL MEDICINE

## 2024-10-03 PROCEDURE — 1123F ACP DISCUSS/DSCN MKR DOCD: CPT | Performed by: INTERNAL MEDICINE

## 2024-10-03 PROCEDURE — 1090F PRES/ABSN URINE INCON ASSESS: CPT | Performed by: INTERNAL MEDICINE

## 2024-10-03 PROCEDURE — 99214 OFFICE O/P EST MOD 30 MIN: CPT | Performed by: INTERNAL MEDICINE

## 2024-10-03 PROCEDURE — G8427 DOCREV CUR MEDS BY ELIG CLIN: HCPCS | Performed by: INTERNAL MEDICINE

## 2024-10-03 PROCEDURE — 3079F DIAST BP 80-89 MM HG: CPT | Performed by: INTERNAL MEDICINE

## 2024-10-03 PROCEDURE — G8420 CALC BMI NORM PARAMETERS: HCPCS | Performed by: INTERNAL MEDICINE

## 2024-10-03 PROCEDURE — 4004F PT TOBACCO SCREEN RCVD TLK: CPT | Performed by: INTERNAL MEDICINE

## 2024-10-03 PROCEDURE — G8484 FLU IMMUNIZE NO ADMIN: HCPCS | Performed by: INTERNAL MEDICINE

## 2024-10-03 PROCEDURE — 3077F SYST BP >= 140 MM HG: CPT | Performed by: INTERNAL MEDICINE

## 2024-10-03 PROCEDURE — G8399 PT W/DXA RESULTS DOCUMENT: HCPCS | Performed by: INTERNAL MEDICINE

## 2024-10-03 RX ORDER — BUPROPION HYDROCHLORIDE 150 MG/1
150 TABLET ORAL EVERY MORNING
Qty: 90 TABLET | Refills: 4 | Status: SHIPPED | OUTPATIENT
Start: 2024-10-03

## 2024-10-03 ASSESSMENT — PATIENT HEALTH QUESTIONNAIRE - PHQ9
SUM OF ALL RESPONSES TO PHQ9 QUESTIONS 1 & 2: 6
2. FEELING DOWN, DEPRESSED OR HOPELESS: NEARLY EVERY DAY
SUM OF ALL RESPONSES TO PHQ QUESTIONS 1-9: 6
SUM OF ALL RESPONSES TO PHQ QUESTIONS 1-9: 6
1. LITTLE INTEREST OR PLEASURE IN DOING THINGS: NEARLY EVERY DAY
SUM OF ALL RESPONSES TO PHQ QUESTIONS 1-9: 6
SUM OF ALL RESPONSES TO PHQ QUESTIONS 1-9: 6

## 2024-10-03 NOTE — PROGRESS NOTES
A/P:    1. Essential hypertension  Blood pressure is just above goal today, with significant recent stressors. Will have her continue her current dose of losartan and follow up in four weeks.    2. Mild episode of recurrent major depressive disorder (HCC)  Unclear control, with increased stress and difficulty with twice daily dosing. She will start taking bupropion XL once daily and follow up in four weeks.   - buPROPion (WELLBUTRIN XL) 150 MG extended release tablet; Take 1 tablet by mouth every morning  Dispense: 90 tablet; Refill: 4    3. Diarrhea of presumed infectious origin  Subacute, s/p antibiotic treatment in August. There may be some GI upset with the more frequent dosing of methimazole and her hyperthyroidism; however, infectious etiology and C. Diff should be ruled out with stool testing.   - Clostridium Difficile Toxin/Antigen; Future  - Culture, Stool; Future    4. Hyperthyroidism  Followed by endocrinology, adjusting methimazole dosing, based on labs, as directed.    5. Cigarette nicotine dependence without complication  Decreased cigarette smoking on bupropion. Will continue the medication with a goal of smoking cessation.  - buPROPion (WELLBUTRIN XL) 150 MG extended release tablet; Take 1 tablet by mouth every morning  Dispense: 90 tablet; Refill: 4         Follow up:  4 weeks for HTN, depression          An electronic signature was used to authenticate this note.    --Fawn Paul MD         HPI: Alley Gannon is here for a follow up visit:      HTN: Ms. Gannon is taking her medication for blood pressure daily without side effects. Denies exertional chest pain, exertional dyspnea, significant leg swelling, dizziness, but did call on 9/3 reporting elevated blood pressure of 145/88.  She is feeling better now, has not checked    Depression: She was supposed to taper off the paroxetine and start bupropion after her appointment 8/29. She is often forgetting to take her evening dose of bupropion.

## 2024-10-09 DIAGNOSIS — R19.7 DIARRHEA OF PRESUMED INFECTIOUS ORIGIN: ICD-10-CM

## 2024-10-09 LAB
C DIFF GDH STL QL: NEGATIVE
C DIFF TOX A+B STL QL IA: NEGATIVE
C DIFF TOXIN INTERPRETATION: NORMAL

## 2024-10-10 ENCOUNTER — TELEPHONE (OUTPATIENT)
Age: 74
End: 2024-10-10

## 2024-10-10 NOTE — TELEPHONE ENCOUNTER
The patient is requesting a call back to discuss / review her most recent lab results. [phone].

## 2024-10-10 NOTE — TELEPHONE ENCOUNTER
This was on her initial C. Diff results:     Your initial test screening for particular type of diarrheal infection is negative.  I will be in touch when the other stool culture results are back.

## 2024-10-10 NOTE — TELEPHONE ENCOUNTER
The patient called again stating that she is concern because she did not receive a call back and she was alerted that her labs was in my chart. She stated that she don't use my chart she will like to be advise [phone].

## 2024-10-13 LAB
BACTERIA SPEC CULT: NORMAL
CAMPYLOBACTER STL CULT: NORMAL
E COLI SXT STL QL IA: NEGATIVE
SALMONELLA/SHIGELLA SCREEN: NORMAL
SPECIMEN SOURCE: NORMAL

## 2024-10-17 ENCOUNTER — OFFICE VISIT (OUTPATIENT)
Age: 74
End: 2024-10-17
Payer: MEDICARE

## 2024-10-17 VITALS
RESPIRATION RATE: 14 BRPM | BODY MASS INDEX: 23.9 KG/M2 | DIASTOLIC BLOOD PRESSURE: 79 MMHG | SYSTOLIC BLOOD PRESSURE: 133 MMHG | WEIGHT: 140 LBS | HEART RATE: 77 BPM | HEIGHT: 64 IN | OXYGEN SATURATION: 97 %

## 2024-10-17 DIAGNOSIS — R19.7 DIARRHEA OF PRESUMED INFECTIOUS ORIGIN: ICD-10-CM

## 2024-10-17 DIAGNOSIS — I10 ESSENTIAL HYPERTENSION: Primary | ICD-10-CM

## 2024-10-17 DIAGNOSIS — F33.0 MILD EPISODE OF RECURRENT MAJOR DEPRESSIVE DISORDER (HCC): ICD-10-CM

## 2024-10-17 DIAGNOSIS — E11.42 TYPE 2 DIABETES MELLITUS WITH DIABETIC POLYNEUROPATHY, WITHOUT LONG-TERM CURRENT USE OF INSULIN (HCC): ICD-10-CM

## 2024-10-17 PROCEDURE — 99214 OFFICE O/P EST MOD 30 MIN: CPT | Performed by: INTERNAL MEDICINE

## 2024-10-17 PROCEDURE — 3017F COLORECTAL CA SCREEN DOC REV: CPT | Performed by: INTERNAL MEDICINE

## 2024-10-17 PROCEDURE — 3078F DIAST BP <80 MM HG: CPT | Performed by: INTERNAL MEDICINE

## 2024-10-17 PROCEDURE — G8399 PT W/DXA RESULTS DOCUMENT: HCPCS | Performed by: INTERNAL MEDICINE

## 2024-10-17 PROCEDURE — G8482 FLU IMMUNIZE ORDER/ADMIN: HCPCS | Performed by: INTERNAL MEDICINE

## 2024-10-17 PROCEDURE — 3044F HG A1C LEVEL LT 7.0%: CPT | Performed by: INTERNAL MEDICINE

## 2024-10-17 PROCEDURE — G8420 CALC BMI NORM PARAMETERS: HCPCS | Performed by: INTERNAL MEDICINE

## 2024-10-17 PROCEDURE — 1090F PRES/ABSN URINE INCON ASSESS: CPT | Performed by: INTERNAL MEDICINE

## 2024-10-17 PROCEDURE — 4004F PT TOBACCO SCREEN RCVD TLK: CPT | Performed by: INTERNAL MEDICINE

## 2024-10-17 PROCEDURE — G8427 DOCREV CUR MEDS BY ELIG CLIN: HCPCS | Performed by: INTERNAL MEDICINE

## 2024-10-17 PROCEDURE — 3075F SYST BP GE 130 - 139MM HG: CPT | Performed by: INTERNAL MEDICINE

## 2024-10-17 PROCEDURE — 1123F ACP DISCUSS/DSCN MKR DOCD: CPT | Performed by: INTERNAL MEDICINE

## 2024-10-17 PROCEDURE — 2022F DILAT RTA XM EVC RTNOPTHY: CPT | Performed by: INTERNAL MEDICINE

## 2024-10-17 ASSESSMENT — PATIENT HEALTH QUESTIONNAIRE - PHQ9
SUM OF ALL RESPONSES TO PHQ QUESTIONS 1-9: 2
1. LITTLE INTEREST OR PLEASURE IN DOING THINGS: SEVERAL DAYS
SUM OF ALL RESPONSES TO PHQ9 QUESTIONS 1 & 2: 2
SUM OF ALL RESPONSES TO PHQ QUESTIONS 1-9: 2
SUM OF ALL RESPONSES TO PHQ QUESTIONS 1-9: 2
2. FEELING DOWN, DEPRESSED OR HOPELESS: SEVERAL DAYS
SUM OF ALL RESPONSES TO PHQ QUESTIONS 1-9: 2

## 2024-10-17 NOTE — PROGRESS NOTES
A/P:    1. Essential hypertension  Improved, blood pressure at goal on losartan.  Continue current medication.    2. Mild episode of recurrent major depressive disorder (HCC)  Improved.  Her stress levels are decreased, she will continue the bupropion.    3. Diarrhea of presumed infectious origin  Improving, but still with episodic diarrhea.  We discussed postinfectious IBS versus beef allergy.  She will follow-up for labs for alpha gal.  - Allergen, Food, Alpha-Gal Panel, IgE; Future     4. Type 2 diabetes mellitus with diabetic neuropathy  Previously very well-controlled with hemoglobin A1c less than 6% for quite some time.  She will continue her current diabetic diet and follow-up for A1c soon.  - Hemoglobin A1C; Future        Follow up:  6 months for AWV          An electronic signature was used to authenticate this note.    --Fawn Paul MD         HPI: Alley Gannon is here for a follow up visit:      HTN: Ms. Gannon is taking her for blood pressure daily without side effects. Denies exertional chest pain, dyspnea, significant leg swelling, dizziness.   Her stress level is lower-put her house on the market yesterday. She has been walking for exercise.     Depression: Her mood has generally been better, though she just got the bupropion yesterday.     Diarrhea: Her diarrhea is slowing down, but she did not have any today, though she had two BMs yesterday. She thinks she is allergic to beef; she ate beef two days ago, then had diarrhea that day.     She is smoking but is down to 1/2 ppd.         Current Outpatient Medications:     buPROPion (WELLBUTRIN XL) 150 MG extended release tablet, Take 1 tablet by mouth every morning, Disp: 90 tablet, Rfl: 4    diclofenac sodium ([employer] DICLOFENAC SODIUM) 1 % GEL, Place onto the skin, Disp: , Rfl:     losartan (COZAAR) 50 MG tablet, Take 1 tablet by mouth daily, Disp: 90 tablet, Rfl: 1    Apoaequorin (PREVAGEN PO), Take by mouth, Disp: , Rfl:     vitamin B-12

## 2024-11-06 DIAGNOSIS — I10 PRIMARY HYPERTENSION: ICD-10-CM

## 2024-11-06 RX ORDER — LOSARTAN POTASSIUM 50 MG/1
50 TABLET ORAL DAILY
Qty: 90 TABLET | Refills: 1 | Status: SHIPPED | OUTPATIENT
Start: 2024-11-06

## 2024-11-06 NOTE — TELEPHONE ENCOUNTER
The patient is requesting a Rx refill on the medication listed below:   losartan (COZAAR) 50 MG tablet   Paroxetine 10 mg  [facility]/pharmacy #[id number] - [place], [place] - [address] [place]  [address] [phone] - F [phone]

## 2024-11-06 NOTE — TELEPHONE ENCOUNTER
PCP: Fawn Paul MD    Last appt: 10/17/2024   Future Appointments   Date Time Provider Department Center   3/12/2025 10:00 AM Vikki Guardado PSYD [facility]   4/1/2025 12:30 PM NEUROPSYCH TESTING [facility]   4/16/2025  1:00 PM [facility]       Requested Prescriptions     Pending Prescriptions Disp Refills    losartan (COZAAR) 50 MG tablet 90 tablet 1     Sig: Take 1 tablet by mouth daily     Last ordered 8/15/24    Zahida \"All\" AIDEE Lopez

## 2024-11-12 ENCOUNTER — TELEPHONE (OUTPATIENT)
Dept: PHARMACY | Facility: CLINIC | Age: 74
End: 2024-11-12

## 2024-11-12 NOTE — TELEPHONE ENCOUNTER
[facility] CLINICAL PHARMACY: ADHERENCE REVIEW  Identified care gap per [facility] fills with [facility]  [facility]: Diabetes adherence    Medicare and [facility] Dual Special Needs Plan - MRDSNP  MA-PCPi  Per insurer report, LIS-2 - may be able to receive up to a 100-day supply for the same cost as a 30-day supply.  Patient also appears to be prescribed: ACE/ARB    ASSESSMENT    ACE/ARB ADHERENCE    Insurance Records claims through  24  (Prior Year PDC = not reported; YTD PDC = FIRST FILL; Potential Fail Date: 12.10.24):   LOSARTAN POT TAB 50 MG last filled on 08.15.24 for 90 day supply. Next refill due: 24    Prescribed si tablet/capsule daily    Per Reconcile Dispense History and Insurer Portal: last filled on 08.15.24 for 90 day supply.     Per [facility]: Pt last filled on 24 for 90 day supply. Billed through [facility]. 1 refills remaining.    BP Readings from Last 3 Encounters:   10/17/24 133/79   10/03/24 (!) 145/90   24 119/82     Estimated Creatinine Clearance: 71 mL/min (based on SCr of 0.6 mg/dL).  Lab Results   Component Value Date    CREATININE 0.60 08/15/2024     Lab Results   Component Value Date    K 4.1 08/15/2024     DIABETES ADHERENCE    Insurance Records claims through  24  (Prior Year PDC = not reported; YTD PDC = 80%; Potential Fail Date: 24):   METFORMIN  MG ER last filled on 24 for 100 day supply. Next refill due: 24    Per patient message on 24, The A1c is very low again, showing excellent blood sugar control. You may stop taking the metformin.     Lab Results   Component Value Date    LABA1C 5.6 2024    LABA1C 5.4 2024    LABA1C 5.7 (H) 2022     PLAN  The following are interventions that have been identified:   No longer taking METFORMIN  MG ER, Per patient message on 24, The A1c is very low again, showing excellent blood sugar control. You may stop taking the metformin.     Outreach:  No

## 2024-12-08 ENCOUNTER — HOSPITAL ENCOUNTER (EMERGENCY)
Facility: HOSPITAL | Age: 74
Discharge: HOME | End: 2024-12-08
Attending: STUDENT IN AN ORGANIZED HEALTH CARE EDUCATION/TRAINING PROGRAM
Payer: MEDICARE

## 2024-12-08 ENCOUNTER — APPOINTMENT (OUTPATIENT)
Facility: HOSPITAL | Age: 74
End: 2024-12-08
Payer: MEDICARE

## 2024-12-08 VITALS
HEART RATE: 79 BPM | TEMPERATURE: 98 F | WEIGHT: 150 LBS | HEIGHT: 64 IN | BODY MASS INDEX: 25.61 KG/M2 | RESPIRATION RATE: 16 BRPM | SYSTOLIC BLOOD PRESSURE: 127 MMHG | DIASTOLIC BLOOD PRESSURE: 69 MMHG | OXYGEN SATURATION: 95 %
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
## 2024-12-08 LAB
ALBUMIN SERPL-MCNC: 3.6 G/DL (ref 3.5–5)
ALBUMIN/GLOB SERPL: 1 (ref 1.1–2.2)
ALP SERPL-CCNC: 80 U/L (ref 45–117)
ALT SERPL-CCNC: 29 U/L (ref 12–78)
ANION GAP SERPL CALC-SCNC: 6 MMOL/L (ref 2–12)
AST SERPL-CCNC: 24 U/L (ref 15–37)
BASOPHILS # BLD: 0 K/UL (ref 0–0.1)
BASOPHILS NFR BLD: 0 % (ref 0–1)
BILIRUB SERPL-MCNC: 0.6 MG/DL (ref 0.2–1)
BUN SERPL-MCNC: 18 MG/DL (ref 6–20)
BUN/CREAT SERPL: 24 (ref 12–20)
CALCIUM SERPL-MCNC: 9.2 MG/DL (ref 8.5–10.1)
CHLORIDE SERPL-SCNC: 110 MMOL/L (ref 97–108)
CO2 SERPL-SCNC: 25 MMOL/L (ref 21–32)
CREAT SERPL-MCNC: 0.74 MG/DL (ref 0.55–1.02)
DIFFERENTIAL METHOD BLD: ABNORMAL
EOSINOPHIL # BLD: 0.1 K/UL (ref 0–0.4)
EOSINOPHIL NFR BLD: 1 % (ref 0–7)
ERYTHROCYTE [DISTWIDTH] IN BLOOD BY AUTOMATED COUNT: 12.9 % (ref 11.5–14.5)
GLOBULIN SER CALC-MCNC: 3.7 G/DL (ref 2–4)
GLUCOSE SERPL-MCNC: 114 MG/DL (ref 65–100)
HCT VFR BLD AUTO: 41.1 % (ref 35–47)
HGB BLD-MCNC: 13.9 G/DL (ref 11.5–16)
IMM GRANULOCYTES # BLD AUTO: 0 K/UL (ref 0–0.04)
IMM GRANULOCYTES NFR BLD AUTO: 1 % (ref 0–0.5)
LIPASE SERPL-CCNC: 37 U/L (ref 13–75)
LYMPHOCYTES # BLD: 1.8 K/UL (ref 0.8–3.5)
LYMPHOCYTES NFR BLD: 27 % (ref 12–49)
MCH RBC QN AUTO: 30.7 PG (ref 26–34)
MCHC RBC AUTO-ENTMCNC: 33.8 G/DL (ref 30–36.5)
MCV RBC AUTO: 90.7 FL (ref 80–99)
MONOCYTES # BLD: 0.6 K/UL (ref 0–1)
MONOCYTES NFR BLD: 9 % (ref 5–13)
NEUTS SEG # BLD: 4.1 K/UL (ref 1.8–8)
NEUTS SEG NFR BLD: 62 % (ref 32–75)
NRBC # BLD: 0 K/UL (ref 0–0.01)
NRBC BLD-RTO: 0 PER 100 WBC
PLATELET # BLD AUTO: 197 K/UL (ref 150–400)
PMV BLD AUTO: 12.7 FL (ref 8.9–12.9)
POTASSIUM SERPL-SCNC: 3.9 MMOL/L (ref 3.5–5.1)
PROT SERPL-MCNC: 7.3 G/DL (ref 6.4–8.2)
RBC # BLD AUTO: 4.53 M/UL (ref 3.8–5.2)
SODIUM SERPL-SCNC: 141 MMOL/L (ref 136–145)
WBC # BLD AUTO: 6.6 K/UL (ref 3.6–11)

## 2024-12-08 PROCEDURE — 73030 X-RAY EXAM OF SHOULDER: CPT

## 2024-12-08 PROCEDURE — 71260 CT THORAX DX C+: CPT

## 2024-12-08 PROCEDURE — 6360000002 HC RX W HCPCS: Performed by: STUDENT IN AN ORGANIZED HEALTH CARE EDUCATION/TRAINING PROGRAM

## 2024-12-08 PROCEDURE — 99285 EMERGENCY DEPT VISIT HI MDM: CPT

## 2024-12-08 PROCEDURE — 96374 THER/PROPH/DIAG INJ IV PUSH: CPT

## 2024-12-08 PROCEDURE — 2500000003 HC RX 250 WO HCPCS: Performed by: STUDENT IN AN ORGANIZED HEALTH CARE EDUCATION/TRAINING PROGRAM

## 2024-12-08 PROCEDURE — 73562 X-RAY EXAM OF KNEE 3: CPT

## 2024-12-08 PROCEDURE — 73110 X-RAY EXAM OF WRIST: CPT

## 2024-12-08 PROCEDURE — 70450 CT HEAD/BRAIN W/O DYE: CPT

## 2024-12-08 PROCEDURE — 96375 TX/PRO/DX INJ NEW DRUG ADDON: CPT

## 2024-12-08 PROCEDURE — 73060 X-RAY EXAM OF HUMERUS: CPT

## 2024-12-08 PROCEDURE — 72125 CT NECK SPINE W/O DYE: CPT

## 2024-12-08 PROCEDURE — 36415 COLL VENOUS BLD VENIPUNCTURE: CPT

## 2024-12-08 PROCEDURE — 83690 ASSAY OF LIPASE: CPT

## 2024-12-08 PROCEDURE — 6360000004 HC RX CONTRAST MEDICATION: Performed by: STUDENT IN AN ORGANIZED HEALTH CARE EDUCATION/TRAINING PROGRAM

## 2024-12-08 PROCEDURE — 85025 COMPLETE CBC W/AUTO DIFF WBC: CPT

## 2024-12-08 PROCEDURE — 80053 COMPREHEN METABOLIC PANEL: CPT

## 2024-12-08 RX ORDER — ACETAMINOPHEN 500 MG
500 TABLET ORAL 4 TIMES DAILY PRN
Qty: 360 TABLET | Refills: 1 | Status: SHIPPED | OUTPATIENT
Start: 2024-12-08

## 2024-12-08 RX ORDER — KETOROLAC TROMETHAMINE 15 MG/ML
15 INJECTION, SOLUTION INTRAMUSCULAR; INTRAVENOUS
Status: COMPLETED | OUTPATIENT
Start: 2024-12-08 | End: 2024-12-08

## 2024-12-08 RX ORDER — IOPAMIDOL 755 MG/ML
100 INJECTION, SOLUTION INTRAVASCULAR
Status: COMPLETED | OUTPATIENT
Start: 2024-12-08 | End: 2024-12-08

## 2024-12-08 RX ORDER — MORPHINE SULFATE 2 MG/ML
2 INJECTION, SOLUTION INTRAMUSCULAR; INTRAVENOUS
Status: COMPLETED | OUTPATIENT
Start: 2024-12-08 | End: 2024-12-08

## 2024-12-08 RX ORDER — IBUPROFEN 600 MG/1
600 TABLET, FILM COATED ORAL 4 TIMES DAILY PRN
Qty: 40 TABLET | Refills: 0 | Status: SHIPPED | OUTPATIENT
Start: 2024-12-08

## 2024-12-08 RX ADMIN — MORPHINE SULFATE 2 MG: 2 INJECTION, SOLUTION INTRAMUSCULAR; INTRAVENOUS at 20:18

## 2024-12-08 RX ADMIN — KETOROLAC TROMETHAMINE 15 MG: 15 INJECTION, SOLUTION INTRAMUSCULAR; INTRAVENOUS at 22:36

## 2024-12-08 RX ADMIN — IOPAMIDOL 100 ML: 755 INJECTION, SOLUTION INTRAVENOUS at 18:06

## 2024-12-08 ASSESSMENT — PAIN DESCRIPTION - LOCATION
LOCATION: HIP;BACK
LOCATION: ABDOMEN;HIP;KNEE
LOCATION: HIP;LEG

## 2024-12-08 ASSESSMENT — PAIN SCALES - GENERAL
PAINLEVEL_OUTOF10: 7
PAINLEVEL_OUTOF10: 8
PAINLEVEL_OUTOF10: 9

## 2024-12-08 ASSESSMENT — PAIN DESCRIPTION - ORIENTATION
ORIENTATION: LEFT

## 2024-12-08 ASSESSMENT — PAIN DESCRIPTION - DESCRIPTORS: DESCRIPTORS: ACHING

## 2024-12-08 NOTE — ED PROVIDER NOTES
[facility] EMERGENCY DEPT  EMERGENCY DEPARTMENT ENCOUNTER      Pt Name: Alley Gannon  MRN: [id number]  Birthdate 1950  Date of evaluation: 12/8/2024  Provider: Jeannette Joe MD    CHIEF COMPLAINT       Chief Complaint   Patient presents with    Fall    Abdominal Pain         HISTORY OF PRESENT ILLNESS   (Location/Symptom, Timing/Onset, Context/Setting, Quality, Duration, Modifying Factors, Severity)  Note limiting factors.   The history is provided by the patient.     73-year-old female with a history of cocaine use, depression, GERD, nephrolithiasis presenting for fall.  Reports that she fell down at least 7 concrete steps landing on her left side after being pulled by her dog.  Reports that she has having pain across the left side of her chest left upper abdomen and left flank.  Reports that she did hit her head, denies loss of consciousness, reports no blood thinners.  Reports that she has pain in her right wrist, left shoulder, right knee.         Review of External Medical Records:         Nursing Notes were reviewed.  Patient also mention she has a chronic problem with stool frequency and watery stools, reports ongoing for 2 months, reports no change from baseline.  Reports no weakness in arms or legs reports no perineal numbness.    REVIEW OF SYSTEMS    (2-9 systems for level 4, 10 or more for level 5)     Except as noted above the remainder of the review of systems was reviewed and negative.       PAST MEDICAL HISTORY     Past Medical History:   Diagnosis Date    Cocaine abuse (HCC)     Depression     Zoloft, Paxil used in past--side effects    Gastrointestinal disorder     Hematuria     History of mammography, screening 2013    Nephrolithiasis     Pap smear for cervical cancer screening 2013         SURGICAL HISTORY       Past Surgical History:   Procedure Laterality Date    BREAST ENHANCEMENT SURGERY  1976    breast implant surgery     CHOLECYSTECTOMY  1990    gallbladder removal    ORTHOPEDIC

## 2024-12-08 NOTE — DISCHARGE INSTRUCTIONS
For pain management, both Tylenol and ibuprofen (motrin) can be taken. They have a different chemical composition and may give more relief together than can be provided using either alone.  How to alternate Ibuprofen and Tylenol:  ? With food, You will take a dose of pain medication every three hours.  ? Start by taking 650 mg of Tylenol   ? 3 hours later take 600 mg of Motrin   ? 3 hours after taking the Motrin take 650 mg of Tylenol  ? 3 hours after that take 600 mg of Motrin.   ? You can continue to alternate Ibuprofen and Tylenol, up to the maximum doses of each medicine, but do not exceed the maximum dose of each.  ? Be sure to maintain proper dosing intervals between successive doses of the same medication (at least 4 hours)    Do not take more than 4,000 mg of Tylenol or 3,200 mg of Motrin in a 24-hour period!        INCIDENTAL FINDING:  Complex right thyroid cyst measuring 2.2 cm. Recommend ultrasound for further evaluation.

## 2024-12-08 NOTE — ED TRIAGE NOTES
Patient arrives to the ED for complaints of left side pain, worse in the \"waist and my shoulder\". Worst pain is in her back. Patient's dog pulled her down ~7 steps and she landed on her left side.     Denies LOC, blood thinners.     Also reports increased frequency and urgency of stools for 2 months. States she goes \"6 times a day and sometimes I don't realize I have to go\".    Denies blood in stool.

## 2024-12-12 ENCOUNTER — TELEPHONE (OUTPATIENT)
Dept: PHARMACY | Facility: CLINIC | Age: 74
End: 2024-12-12

## 2024-12-12 NOTE — TELEPHONE ENCOUNTER
Fawn Paul MD     Alley Gannon had an appointment with you 10/17/2024 and has been identified with a care gap for Statin Use in Person With Diabetes (SUPD).    Per patient chart review:   Was previously taking ROSUVASTATIN 5 MG, and stopped due to muscle cramping  This patient may be excluded from taking a statin by entering Myopathy G72.0, G72.2, G72.9) as a visit dx code. This dx code must be entered once each calendar year for the patient to be excluded from taking a statin. CMS/HEDIS does not recognize myalgia for statin gap exclusion, myopathy must be used for diabetes.     Can you please addend your OV from 10/17/2024 and add dx code Myopathy: (G72.0, G72.89, G72.9)as a visit dx code?      Please let me know if you have any questions!    Thank you,  Olivia Terrell, PharmD, [facility]  Population Health Pharmacist  [facility]   Department, toll free: [phone] Option 1     Thank you, Fawn Paul MD!    EXC- 10/17/2024 Myopathy: (G72.0, G72.89, G72.9) entered by Fawn Paul    For Pharmacy Admin Tracking Only    Program: [facility] DealPing  CPA in place:  No  Recommendation Provided To: Provider: 1 via Note to Provider  Intervention Detail: New Rx: 0, reason: Needs Additional Therapy  Intervention Accepted By: Provider: 1  Gap Closed?: Yes   Time Spent (min): 30   ===============================================================================   POPULATION HEALTH CLINICAL PHARMACY: STATIN THERAPY REVIEW  Identified Statin Use In Diabetes care gap per [employer] Records dated:12/12/24     Statin Use in Persons with Diabetes Definition:  Eligible:Members with diabetes ages 40-75 during the measurement year    Definition: Medicare members with diabetes ages 40-75 who receive at least 1 fill of a statin medication in the measurement year   Members with diabetes are defined as those who have at least 2 fills of diabetes medications during the measurement year     Compliance: To comply with